# Patient Record
Sex: FEMALE | Race: WHITE | NOT HISPANIC OR LATINO
[De-identification: names, ages, dates, MRNs, and addresses within clinical notes are randomized per-mention and may not be internally consistent; named-entity substitution may affect disease eponyms.]

---

## 2017-01-05 ENCOUNTER — LABORATORY RESULT (OUTPATIENT)
Age: 67
End: 2017-01-05

## 2017-01-05 ENCOUNTER — APPOINTMENT (OUTPATIENT)
Dept: ENDOCRINOLOGY | Facility: CLINIC | Age: 67
End: 2017-01-05

## 2017-01-05 VITALS
HEART RATE: 78 BPM | HEIGHT: 66 IN | DIASTOLIC BLOOD PRESSURE: 78 MMHG | BODY MASS INDEX: 36.96 KG/M2 | SYSTOLIC BLOOD PRESSURE: 122 MMHG | WEIGHT: 230 LBS

## 2017-01-05 LAB — GLUCOSE BLDC GLUCOMTR-MCNC: 150

## 2017-01-06 LAB
T4 FREE SERPL-MCNC: 1.8 NG/DL
T4 SERPL-MCNC: 10 UG/DL
TSH SERPL-ACNC: 0.01 UU/ML

## 2017-01-09 LAB
THYROGLOB AB SERPL-ACNC: <20 IU/ML
THYROGLOB AB SERPL-ACNC: <20 IU/ML
THYROGLOB SERPL-MCNC: <0.2 NG/ML
THYROPEROXIDASE AB SERPL IA-ACNC: <10 IU/ML

## 2017-02-15 ENCOUNTER — RX RENEWAL (OUTPATIENT)
Age: 67
End: 2017-02-15

## 2017-03-13 ENCOUNTER — APPOINTMENT (OUTPATIENT)
Dept: ENDOCRINOLOGY | Facility: CLINIC | Age: 67
End: 2017-03-13

## 2017-03-27 ENCOUNTER — APPOINTMENT (OUTPATIENT)
Dept: ENDOCRINOLOGY | Facility: CLINIC | Age: 67
End: 2017-03-27

## 2017-04-20 ENCOUNTER — LABORATORY RESULT (OUTPATIENT)
Age: 67
End: 2017-04-20

## 2017-04-20 ENCOUNTER — APPOINTMENT (OUTPATIENT)
Dept: ENDOCRINOLOGY | Facility: CLINIC | Age: 67
End: 2017-04-20
Payer: MEDICARE

## 2017-04-20 VITALS
HEIGHT: 66 IN | HEART RATE: 80 BPM | SYSTOLIC BLOOD PRESSURE: 130 MMHG | WEIGHT: 220 LBS | BODY MASS INDEX: 35.36 KG/M2 | DIASTOLIC BLOOD PRESSURE: 70 MMHG

## 2017-04-20 LAB
GLUCOSE BLDC GLUCOMTR-MCNC: 149
HBA1C MFR BLD HPLC: 6.7

## 2017-04-20 PROCEDURE — 83036 HEMOGLOBIN GLYCOSYLATED A1C: CPT | Mod: QW

## 2017-04-20 PROCEDURE — 99214 OFFICE O/P EST MOD 30 MIN: CPT | Mod: 25

## 2017-04-20 PROCEDURE — 36415 COLL VENOUS BLD VENIPUNCTURE: CPT

## 2017-04-20 PROCEDURE — 82962 GLUCOSE BLOOD TEST: CPT

## 2017-04-20 RX ORDER — GLUCAGON 1 MG
1 KIT INJECTION
Qty: 3 | Refills: 0 | Status: ACTIVE | COMMUNITY
Start: 2016-12-07

## 2017-04-20 RX ORDER — EPINEPHRINE 0.3 MG/.3ML
0.3 INJECTION INTRAMUSCULAR
Qty: 6 | Refills: 0 | Status: ACTIVE | COMMUNITY
Start: 2016-12-07

## 2017-04-20 RX ORDER — MOMETASONE FUROATE 1 MG/G
0.1 CREAM TOPICAL
Qty: 45 | Refills: 0 | Status: ACTIVE | COMMUNITY
Start: 2017-04-12

## 2017-04-24 LAB
ALBUMIN SERPL ELPH-MCNC: 4.1 G/DL
ALP BLD-CCNC: 92 U/L
ALT SERPL-CCNC: 11 U/L
ANION GAP SERPL CALC-SCNC: 15 MMOL/L
APPEARANCE: ABNORMAL
AST SERPL-CCNC: 12 U/L
BASOPHILS # BLD AUTO: 0.05 K/UL
BASOPHILS NFR BLD AUTO: 0.4 %
BILIRUB SERPL-MCNC: 0.7 MG/DL
BILIRUBIN URINE: NEGATIVE
BLOOD URINE: NEGATIVE
BUN SERPL-MCNC: 24 MG/DL
CALCIUM SERPL-MCNC: 10.1 MG/DL
CHLORIDE SERPL-SCNC: 105 MMOL/L
CO2 SERPL-SCNC: 24 MMOL/L
COLOR: ABNORMAL
CREAT SERPL-MCNC: 0.85 MG/DL
CREAT SPEC-SCNC: 197 MG/DL
DHEA-S SERPL-MCNC: 69.7 UG/DL
EOSINOPHIL # BLD AUTO: 0.18 K/UL
EOSINOPHIL NFR BLD AUTO: 1.4 %
FERRITIN SERPL-MCNC: 35.7 NG/ML
FOLATE SERPL-MCNC: 17.5 NG/ML
GLUCOSE QUALITATIVE U: 100 MG/DL
GLUCOSE SERPL-MCNC: 102 MG/DL
HCT VFR BLD CALC: 39.1 %
HGB BLD-MCNC: 12.9 G/DL
IMM GRANULOCYTES NFR BLD AUTO: 0.1 %
IRON SATN MFR SERPL: 17 %
IRON SERPL-MCNC: 47 UG/DL
KETONES URINE: ABNORMAL
LEUKOCYTE ESTERASE URINE: ABNORMAL
LYMPHOCYTES # BLD AUTO: 2.53 K/UL
LYMPHOCYTES NFR BLD AUTO: 20.3 %
MAN DIFF?: NORMAL
MCHC RBC-ENTMCNC: 30.6 PG
MCHC RBC-ENTMCNC: 33 GM/DL
MCV RBC AUTO: 92.9 FL
MICROALBUMIN 24H UR DL<=1MG/L-MCNC: 1.4 MG/DL
MICROALBUMIN/CREAT 24H UR-RTO: 7 UG/MG
MONOCYTES # BLD AUTO: 0.63 K/UL
MONOCYTES NFR BLD AUTO: 5.1 %
NEUTROPHILS # BLD AUTO: 9.05 K/UL
NEUTROPHILS NFR BLD AUTO: 72.7 %
NITRITE URINE: NEGATIVE
PH URINE: 6
PLATELET # BLD AUTO: 265 K/UL
POTASSIUM SERPL-SCNC: 4.4 MMOL/L
PROT SERPL-MCNC: 6.4 G/DL
PROTEIN URINE: NEGATIVE MG/DL
RBC # BLD: 4.21 M/UL
RBC # FLD: 15.3 %
SODIUM SERPL-SCNC: 144 MMOL/L
SPECIFIC GRAVITY URINE: 1.03
T4 FREE SERPL-MCNC: 2 NG/DL
T4 SERPL-MCNC: 11.1 UG/DL
TESTOST BND SERPL-MCNC: 1.5 PG/ML
TESTOST SERPL-MCNC: 49.1 NG/DL
THYROGLOB AB SERPL-ACNC: <20 IU/ML
THYROGLOB SERPL-MCNC: <0.2 NG/ML
TIBC SERPL-MCNC: 271 UG/DL
TSH SERPL-ACNC: 0.01 UIU/ML
UIBC SERPL-MCNC: 224 UG/DL
UROBILINOGEN URINE: 1 MG/DL
VIT B12 SERPL-MCNC: 544 PG/ML
WBC # FLD AUTO: 12.45 K/UL

## 2017-05-04 ENCOUNTER — APPOINTMENT (OUTPATIENT)
Dept: ENDOCRINOLOGY | Facility: CLINIC | Age: 67
End: 2017-05-04

## 2017-05-18 ENCOUNTER — APPOINTMENT (OUTPATIENT)
Dept: ENDOCRINOLOGY | Facility: CLINIC | Age: 67
End: 2017-05-18

## 2017-06-19 ENCOUNTER — APPOINTMENT (OUTPATIENT)
Dept: ENDOCRINOLOGY | Facility: CLINIC | Age: 67
End: 2017-06-19

## 2017-08-03 ENCOUNTER — APPOINTMENT (OUTPATIENT)
Dept: ENDOCRINOLOGY | Facility: CLINIC | Age: 67
End: 2017-08-03
Payer: MEDICARE

## 2017-08-03 VITALS
HEART RATE: 82 BPM | BODY MASS INDEX: 35.36 KG/M2 | WEIGHT: 220 LBS | HEIGHT: 66 IN | SYSTOLIC BLOOD PRESSURE: 138 MMHG | DIASTOLIC BLOOD PRESSURE: 78 MMHG

## 2017-08-03 LAB
GLUCOSE BLDC GLUCOMTR-MCNC: 60
GLUCOSE BLDC GLUCOMTR-MCNC: 68
GLUCOSE BLDC GLUCOMTR-MCNC: 78
HBA1C MFR BLD HPLC: 7.1

## 2017-08-03 PROCEDURE — 82962 GLUCOSE BLOOD TEST: CPT

## 2017-08-03 PROCEDURE — 99214 OFFICE O/P EST MOD 30 MIN: CPT | Mod: 25

## 2017-08-03 PROCEDURE — 83036 HEMOGLOBIN GLYCOSYLATED A1C: CPT | Mod: QW

## 2017-09-25 ENCOUNTER — APPOINTMENT (OUTPATIENT)
Dept: ENDOCRINOLOGY | Facility: CLINIC | Age: 67
End: 2017-09-25
Payer: MEDICARE

## 2017-09-25 PROCEDURE — G0108 DIAB MANAGE TRN  PER INDIV: CPT

## 2017-10-09 ENCOUNTER — APPOINTMENT (OUTPATIENT)
Dept: ENDOCRINOLOGY | Facility: CLINIC | Age: 67
End: 2017-10-09

## 2017-10-11 ENCOUNTER — LABORATORY RESULT (OUTPATIENT)
Age: 67
End: 2017-10-11

## 2017-10-11 ENCOUNTER — APPOINTMENT (OUTPATIENT)
Dept: ENDOCRINOLOGY | Facility: CLINIC | Age: 67
End: 2017-10-11
Payer: MEDICARE

## 2017-10-11 VITALS
HEIGHT: 66 IN | HEART RATE: 80 BPM | BODY MASS INDEX: 35.36 KG/M2 | WEIGHT: 220 LBS | SYSTOLIC BLOOD PRESSURE: 128 MMHG | DIASTOLIC BLOOD PRESSURE: 76 MMHG

## 2017-10-11 LAB — GLUCOSE BLDC GLUCOMTR-MCNC: 170

## 2017-10-11 PROCEDURE — 82962 GLUCOSE BLOOD TEST: CPT

## 2017-10-11 PROCEDURE — 99214 OFFICE O/P EST MOD 30 MIN: CPT | Mod: 25

## 2017-10-11 PROCEDURE — 36415 COLL VENOUS BLD VENIPUNCTURE: CPT

## 2017-10-16 LAB
ALBUMIN SERPL ELPH-MCNC: 4.1 G/DL
ALP BLD-CCNC: 89 U/L
ALT SERPL-CCNC: 6 U/L
ANION GAP SERPL CALC-SCNC: 16 MMOL/L
AST SERPL-CCNC: 18 U/L
BILIRUB SERPL-MCNC: 0.8 MG/DL
BUN SERPL-MCNC: 27 MG/DL
CALCIUM SERPL-MCNC: 9.7 MG/DL
CHLORIDE SERPL-SCNC: 102 MMOL/L
CO2 SERPL-SCNC: 21 MMOL/L
CREAT SERPL-MCNC: 1.16 MG/DL
GLUCOSE SERPL-MCNC: 192 MG/DL
POTASSIUM SERPL-SCNC: 4.8 MMOL/L
PROT SERPL-MCNC: 6.6 G/DL
SODIUM SERPL-SCNC: 139 MMOL/L
T4 FREE SERPL-MCNC: 1.8 NG/DL
T4 SERPL-MCNC: 10.5 UG/DL
THYROGLOB AB SERPL-ACNC: <20 IU/ML
THYROGLOB SERPL-MCNC: <0.2 NG/ML
TSH SERPL-ACNC: 0.16 UIU/ML

## 2017-10-19 ENCOUNTER — RX RENEWAL (OUTPATIENT)
Age: 67
End: 2017-10-19

## 2018-01-08 ENCOUNTER — LABORATORY RESULT (OUTPATIENT)
Age: 68
End: 2018-01-08

## 2018-01-08 ENCOUNTER — APPOINTMENT (OUTPATIENT)
Dept: ENDOCRINOLOGY | Facility: CLINIC | Age: 68
End: 2018-01-08
Payer: MEDICARE

## 2018-01-08 VITALS
WEIGHT: 216 LBS | HEART RATE: 78 BPM | HEIGHT: 64.5 IN | DIASTOLIC BLOOD PRESSURE: 74 MMHG | BODY MASS INDEX: 36.43 KG/M2 | SYSTOLIC BLOOD PRESSURE: 122 MMHG

## 2018-01-08 LAB
GLUCOSE BLDC GLUCOMTR-MCNC: 103
HBA1C MFR BLD HPLC: 8.6

## 2018-01-08 PROCEDURE — 83036 HEMOGLOBIN GLYCOSYLATED A1C: CPT | Mod: QW

## 2018-01-08 PROCEDURE — 36415 COLL VENOUS BLD VENIPUNCTURE: CPT

## 2018-01-08 PROCEDURE — 99214 OFFICE O/P EST MOD 30 MIN: CPT | Mod: 25

## 2018-01-08 PROCEDURE — 82962 GLUCOSE BLOOD TEST: CPT

## 2018-01-08 RX ORDER — CLOBETASOL PROPIONATE 0.5 MG/G
0.05 OINTMENT TOPICAL
Qty: 30 | Refills: 0 | Status: ACTIVE | COMMUNITY
Start: 2017-08-24

## 2018-01-09 LAB
ALBUMIN SERPL ELPH-MCNC: 3.9 G/DL
ALP BLD-CCNC: 93 U/L
ALT SERPL-CCNC: 8 U/L
ANION GAP SERPL CALC-SCNC: 16 MMOL/L
AST SERPL-CCNC: 11 U/L
BASOPHILS # BLD AUTO: 0.05 K/UL
BASOPHILS NFR BLD AUTO: 0.6 %
BILIRUB SERPL-MCNC: 0.8 MG/DL
BUN SERPL-MCNC: 24 MG/DL
CALCIUM SERPL-MCNC: 9.6 MG/DL
CHLORIDE SERPL-SCNC: 105 MMOL/L
CO2 SERPL-SCNC: 24 MMOL/L
CREAT SERPL-MCNC: 0.84 MG/DL
EOSINOPHIL # BLD AUTO: 0.17 K/UL
EOSINOPHIL NFR BLD AUTO: 1.9 %
GLUCOSE SERPL-MCNC: 101 MG/DL
HCT VFR BLD CALC: 44.2 %
HGB BLD-MCNC: 13 G/DL
IMM GRANULOCYTES NFR BLD AUTO: 0.2 %
LYMPHOCYTES # BLD AUTO: 1.96 K/UL
LYMPHOCYTES NFR BLD AUTO: 22.4 %
MAN DIFF?: NORMAL
MCHC RBC-ENTMCNC: 29.4 GM/DL
MCHC RBC-ENTMCNC: 29.6 PG
MCV RBC AUTO: 100.7 FL
MONOCYTES # BLD AUTO: 0.49 K/UL
MONOCYTES NFR BLD AUTO: 5.6 %
NEUTROPHILS # BLD AUTO: 6.06 K/UL
NEUTROPHILS NFR BLD AUTO: 69.3 %
PLATELET # BLD AUTO: 215 K/UL
POTASSIUM SERPL-SCNC: 4.3 MMOL/L
PROT SERPL-MCNC: 6.5 G/DL
RBC # BLD: 4.39 M/UL
RBC # FLD: 15 %
SODIUM SERPL-SCNC: 145 MMOL/L
T4 FREE SERPL-MCNC: 1.6 NG/DL
T4 SERPL-MCNC: 8.5 UG/DL
TSH SERPL-ACNC: 0.24 UIU/ML
WBC # FLD AUTO: 8.75 K/UL

## 2018-01-11 LAB — THYROGLOB AB SERPL IA-ACNC: 55 IU/ML

## 2018-01-17 ENCOUNTER — APPOINTMENT (OUTPATIENT)
Dept: ENDOCRINOLOGY | Facility: CLINIC | Age: 68
End: 2018-01-17
Payer: MEDICARE

## 2018-01-17 PROCEDURE — G0108 DIAB MANAGE TRN  PER INDIV: CPT

## 2018-01-24 ENCOUNTER — APPOINTMENT (OUTPATIENT)
Dept: ENDOCRINOLOGY | Facility: CLINIC | Age: 68
End: 2018-01-24
Payer: MEDICARE

## 2018-01-24 PROCEDURE — G0108 DIAB MANAGE TRN  PER INDIV: CPT

## 2018-01-24 PROCEDURE — 95249 CONT GLUC MNTR PT PROV EQP: CPT

## 2018-05-17 ENCOUNTER — APPOINTMENT (OUTPATIENT)
Dept: ENDOCRINOLOGY | Facility: CLINIC | Age: 68
End: 2018-05-17
Payer: MEDICARE

## 2018-05-17 ENCOUNTER — LABORATORY RESULT (OUTPATIENT)
Age: 68
End: 2018-05-17

## 2018-05-17 ENCOUNTER — MEDICATION RENEWAL (OUTPATIENT)
Age: 68
End: 2018-05-17

## 2018-05-17 VITALS
OXYGEN SATURATION: 96 % | HEART RATE: 80 BPM | DIASTOLIC BLOOD PRESSURE: 75 MMHG | HEIGHT: 64.5 IN | SYSTOLIC BLOOD PRESSURE: 119 MMHG | BODY MASS INDEX: 34.91 KG/M2 | WEIGHT: 207 LBS

## 2018-05-17 LAB — GLUCOSE BLDC GLUCOMTR-MCNC: 125

## 2018-05-17 PROCEDURE — 82962 GLUCOSE BLOOD TEST: CPT

## 2018-05-17 PROCEDURE — 36415 COLL VENOUS BLD VENIPUNCTURE: CPT

## 2018-05-17 PROCEDURE — 99214 OFFICE O/P EST MOD 30 MIN: CPT | Mod: 25

## 2018-05-22 LAB
T4 FREE SERPL-MCNC: 1.7 NG/DL
T4 SERPL-MCNC: 9.7 UG/DL
TSH SERPL-ACNC: 0.09 UIU/ML

## 2018-05-23 LAB — THYROGLOB AB SERPL IA-ACNC: 54 IU/ML

## 2018-06-18 ENCOUNTER — APPOINTMENT (OUTPATIENT)
Dept: ENDOCRINOLOGY | Facility: CLINIC | Age: 68
End: 2018-06-18
Payer: MEDICARE

## 2018-06-18 VITALS — HEART RATE: 69 BPM | DIASTOLIC BLOOD PRESSURE: 77 MMHG | OXYGEN SATURATION: 98 % | SYSTOLIC BLOOD PRESSURE: 123 MMHG

## 2018-06-18 LAB — GLUCOSE BLDC GLUCOMTR-MCNC: 146

## 2018-06-18 PROCEDURE — G0108 DIAB MANAGE TRN  PER INDIV: CPT

## 2018-06-18 PROCEDURE — 82962 GLUCOSE BLOOD TEST: CPT

## 2018-07-30 ENCOUNTER — LABORATORY RESULT (OUTPATIENT)
Age: 68
End: 2018-07-30

## 2018-07-30 ENCOUNTER — APPOINTMENT (OUTPATIENT)
Dept: INTERNAL MEDICINE | Facility: CLINIC | Age: 68
End: 2018-07-30
Payer: MEDICARE

## 2018-07-30 VITALS
BODY MASS INDEX: 32.92 KG/M2 | HEART RATE: 79 BPM | HEIGHT: 65.5 IN | OXYGEN SATURATION: 98 % | WEIGHT: 200 LBS | TEMPERATURE: 97.8 F

## 2018-07-30 PROCEDURE — 99213 OFFICE O/P EST LOW 20 MIN: CPT | Mod: 25

## 2018-07-30 PROCEDURE — 99203 OFFICE O/P NEW LOW 30 MIN: CPT | Mod: 25

## 2018-07-30 PROCEDURE — 36415 COLL VENOUS BLD VENIPUNCTURE: CPT

## 2018-07-30 RX ORDER — SOLIFENACIN SUCCINATE 5 MG/1
5 TABLET, FILM COATED ORAL
Qty: 30 | Refills: 0 | Status: DISCONTINUED | COMMUNITY
Start: 2016-12-19 | End: 2018-07-30

## 2018-07-30 RX ORDER — DULOXETINE HYDROCHLORIDE 60 MG/1
60 CAPSULE, DELAYED RELEASE PELLETS ORAL
Qty: 60 | Refills: 0 | Status: DISCONTINUED | COMMUNITY
Start: 2017-07-17 | End: 2018-07-30

## 2018-07-30 RX ORDER — LEVOTHYROXINE SODIUM 137 UG/1
137 TABLET ORAL
Qty: 1 | Refills: 1 | Status: DISCONTINUED | COMMUNITY
Start: 2017-08-03 | End: 2018-07-30

## 2018-07-30 RX ORDER — SULFAMETHOXAZOLE AND TRIMETHOPRIM 800; 160 MG/1; MG/1
800-160 TABLET ORAL
Qty: 20 | Refills: 0 | Status: DISCONTINUED | COMMUNITY
Start: 2017-10-19 | End: 2018-07-30

## 2018-07-30 RX ORDER — LEVOTHYROXINE SODIUM 137 UG/1
137 TABLET ORAL
Qty: 90 | Refills: 1 | Status: DISCONTINUED | COMMUNITY
Start: 2018-06-25 | End: 2018-07-30

## 2018-07-30 RX ORDER — LEVOTHYROXINE SODIUM 175 UG/1
175 TABLET ORAL
Qty: 90 | Refills: 0 | Status: DISCONTINUED | COMMUNITY
Start: 2016-11-19 | End: 2018-07-30

## 2018-07-30 RX ORDER — INSULIN DEGLUDEC INJECTION 200 U/ML
200 INJECTION, SOLUTION SUBCUTANEOUS
Qty: 18 | Refills: 0 | Status: DISCONTINUED | COMMUNITY
Start: 2016-12-07 | End: 2018-07-30

## 2018-07-30 RX ORDER — INSULIN GLARGINE 300 U/ML
300 INJECTION, SOLUTION SUBCUTANEOUS
Qty: 2 | Refills: 2 | Status: DISCONTINUED | COMMUNITY
Start: 2017-07-25 | End: 2018-07-30

## 2018-08-02 ENCOUNTER — APPOINTMENT (OUTPATIENT)
Dept: ENDOCRINOLOGY | Facility: CLINIC | Age: 68
End: 2018-08-02
Payer: MEDICARE

## 2018-08-02 VITALS — DIASTOLIC BLOOD PRESSURE: 80 MMHG | SYSTOLIC BLOOD PRESSURE: 130 MMHG

## 2018-08-02 PROCEDURE — G0108 DIAB MANAGE TRN  PER INDIV: CPT
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
## 2018-08-02 NOTE — PHYSICAL EXAM

## 2018-08-09 ENCOUNTER — APPOINTMENT (OUTPATIENT)
Dept: ENDOCRINOLOGY | Facility: CLINIC | Age: 68
End: 2018-08-09
Payer: MEDICARE

## 2018-08-09 VITALS
WEIGHT: 200 LBS | OXYGEN SATURATION: 96 % | HEART RATE: 85 BPM | DIASTOLIC BLOOD PRESSURE: 81 MMHG | SYSTOLIC BLOOD PRESSURE: 141 MMHG | BODY MASS INDEX: 32.92 KG/M2 | HEIGHT: 65.5 IN

## 2018-08-09 LAB — GLUCOSE BLDC GLUCOMTR-MCNC: 190

## 2018-08-09 PROCEDURE — 95251 CONT GLUC MNTR ANALYSIS I&R: CPT

## 2018-08-09 PROCEDURE — G0108 DIAB MANAGE TRN  PER INDIV: CPT

## 2018-08-09 PROCEDURE — 82962 GLUCOSE BLOOD TEST: CPT

## 2018-08-15 ENCOUNTER — OTHER (OUTPATIENT)
Age: 68
End: 2018-08-15

## 2018-08-16 ENCOUNTER — APPOINTMENT (OUTPATIENT)
Dept: ENDOCRINOLOGY | Facility: CLINIC | Age: 68
End: 2018-08-16
Payer: MEDICARE

## 2018-08-16 VITALS — SYSTOLIC BLOOD PRESSURE: 148 MMHG | DIASTOLIC BLOOD PRESSURE: 84 MMHG | HEART RATE: 75 BPM | OXYGEN SATURATION: 98 %

## 2018-08-16 PROCEDURE — 95251 CONT GLUC MNTR ANALYSIS I&R: CPT

## 2018-08-16 PROCEDURE — G0108 DIAB MANAGE TRN  PER INDIV: CPT

## 2018-08-20 ENCOUNTER — MED ADMIN CHARGE (OUTPATIENT)
Age: 68
End: 2018-08-20

## 2018-08-27 ENCOUNTER — APPOINTMENT (OUTPATIENT)
Dept: ENDOCRINOLOGY | Facility: CLINIC | Age: 68
End: 2018-08-27
Payer: MEDICARE

## 2018-08-27 VITALS
WEIGHT: 210 LBS | HEIGHT: 65.5 IN | BODY MASS INDEX: 34.57 KG/M2 | OXYGEN SATURATION: 96 % | SYSTOLIC BLOOD PRESSURE: 133 MMHG | HEART RATE: 92 BPM | DIASTOLIC BLOOD PRESSURE: 72 MMHG

## 2018-08-27 PROCEDURE — G0108 DIAB MANAGE TRN  PER INDIV: CPT

## 2018-08-27 PROCEDURE — 95251 CONT GLUC MNTR ANALYSIS I&R: CPT

## 2018-08-27 PROCEDURE — 82962 GLUCOSE BLOOD TEST: CPT

## 2018-08-28 LAB — GLUCOSE BLDC GLUCOMTR-MCNC: 126

## 2018-08-30 ENCOUNTER — LABORATORY RESULT (OUTPATIENT)
Age: 68
End: 2018-08-30

## 2018-08-30 ENCOUNTER — APPOINTMENT (OUTPATIENT)
Dept: ENDOCRINOLOGY | Facility: CLINIC | Age: 68
End: 2018-08-30
Payer: MEDICARE

## 2018-08-30 VITALS
OXYGEN SATURATION: 95 % | HEIGHT: 65.5 IN | WEIGHT: 207 LBS | SYSTOLIC BLOOD PRESSURE: 143 MMHG | HEART RATE: 79 BPM | BODY MASS INDEX: 34.07 KG/M2 | DIASTOLIC BLOOD PRESSURE: 79 MMHG

## 2018-08-30 LAB — GLUCOSE BLDC GLUCOMTR-MCNC: 218

## 2018-08-30 PROCEDURE — 99215 OFFICE O/P EST HI 40 MIN: CPT | Mod: 25

## 2018-08-30 PROCEDURE — 36415 COLL VENOUS BLD VENIPUNCTURE: CPT

## 2018-08-30 PROCEDURE — 82962 GLUCOSE BLOOD TEST: CPT

## 2018-08-31 LAB
SAVE SPECIMEN: NORMAL
T4 FREE SERPL-MCNC: 1.8 NG/DL
T4 SERPL-MCNC: 9.8 UG/DL
TSH SERPL-ACNC: 0.07 UIU/ML

## 2018-09-05 ENCOUNTER — LABORATORY RESULT (OUTPATIENT)
Age: 68
End: 2018-09-05

## 2018-09-05 ENCOUNTER — APPOINTMENT (OUTPATIENT)
Dept: ENDOCRINOLOGY | Facility: CLINIC | Age: 68
End: 2018-09-05
Payer: MEDICARE

## 2018-09-05 ENCOUNTER — APPOINTMENT (OUTPATIENT)
Dept: INTERNAL MEDICINE | Facility: CLINIC | Age: 68
End: 2018-09-05
Payer: MEDICARE

## 2018-09-05 VITALS
DIASTOLIC BLOOD PRESSURE: 79 MMHG | WEIGHT: 205 LBS | SYSTOLIC BLOOD PRESSURE: 142 MMHG | BODY MASS INDEX: 33.6 KG/M2 | OXYGEN SATURATION: 98 % | HEART RATE: 73 BPM

## 2018-09-05 VITALS
OXYGEN SATURATION: 98 % | WEIGHT: 205 LBS | HEART RATE: 84 BPM | BODY MASS INDEX: 33.75 KG/M2 | HEIGHT: 65.5 IN | TEMPERATURE: 98.5 F

## 2018-09-05 VITALS — SYSTOLIC BLOOD PRESSURE: 130 MMHG | DIASTOLIC BLOOD PRESSURE: 80 MMHG

## 2018-09-05 LAB
GLUCOSE BLDC GLUCOMTR-MCNC: 216
THYROGLOB AB SERPL IA-ACNC: 36 IU/ML

## 2018-09-05 PROCEDURE — 82962 GLUCOSE BLOOD TEST: CPT

## 2018-09-05 PROCEDURE — 99214 OFFICE O/P EST MOD 30 MIN: CPT

## 2018-09-05 PROCEDURE — G0108 DIAB MANAGE TRN  PER INDIV: CPT

## 2018-09-12 ENCOUNTER — LABORATORY RESULT (OUTPATIENT)
Age: 68
End: 2018-09-12

## 2018-09-12 ENCOUNTER — APPOINTMENT (OUTPATIENT)
Dept: ENDOCRINOLOGY | Facility: CLINIC | Age: 68
End: 2018-09-12
Payer: MEDICARE

## 2018-09-12 LAB — GLUCOSE BLDC GLUCOMTR-MCNC: 164

## 2018-09-12 PROCEDURE — 82962 GLUCOSE BLOOD TEST: CPT

## 2018-09-12 PROCEDURE — G0108 DIAB MANAGE TRN  PER INDIV: CPT

## 2018-09-17 LAB — BACTERIA UR CULT: NORMAL

## 2018-10-17 NOTE — PHYSICAL EXAM
[No Acute Distress] : no acute distress [Well Nourished] : well nourished [de-identified] : right TM red, dull, [de-identified] : no LN [de-identified] : clear

## 2018-10-17 NOTE — REVIEW OF SYSTEMS
[Negative] : Heme/Lymph [FreeTextEntry2] : diabetes better, uses a 24/7 monitor 110-145 range [FreeTextEntry5] : cardioolgy  increased her lipitor to 40 mg [FreeTextEntry9] : shoulder much better

## 2018-10-17 NOTE — HISTORY OF PRESENT ILLNESS
[FreeTextEntry8] : here to fill out papers and also has a cold for more than one week with pain in her right ear.\par shoulder better\par parkinson's stable.

## 2018-10-17 NOTE — HEALTH RISK ASSESSMENT
[No falls in past year] : Patient reported no falls in the past year [0] : 1) Little interest or pleasure doing things: Not at all (0) [1] : 2) Feeling down, depressed, or hopeless for several days (1) [] : No [[id number]] : 1

## 2018-11-07 ENCOUNTER — APPOINTMENT (OUTPATIENT)
Dept: PULMONOLOGY | Facility: CLINIC | Age: 68
End: 2018-11-07
Payer: MEDICARE

## 2018-11-07 VITALS
HEART RATE: 80 BPM | HEIGHT: 65.5 IN | RESPIRATION RATE: 16 BRPM | SYSTOLIC BLOOD PRESSURE: 145 MMHG | DIASTOLIC BLOOD PRESSURE: 80 MMHG | BODY MASS INDEX: 32.92 KG/M2 | TEMPERATURE: 98 F | WEIGHT: 200 LBS | OXYGEN SATURATION: 95 %

## 2018-11-07 DIAGNOSIS — G47.33 OBSTRUCTIVE SLEEP APNEA (ADULT) (PEDIATRIC): ICD-10-CM

## 2018-11-07 PROCEDURE — 99204 OFFICE O/P NEW MOD 45 MIN: CPT

## 2018-11-15 ENCOUNTER — APPOINTMENT (OUTPATIENT)
Dept: ENDOCRINOLOGY | Facility: CLINIC | Age: 68
End: 2018-11-15
Payer: MEDICARE

## 2018-11-15 VITALS
DIASTOLIC BLOOD PRESSURE: 56 MMHG | SYSTOLIC BLOOD PRESSURE: 110 MMHG | OXYGEN SATURATION: 96 % | WEIGHT: 200 LBS | HEART RATE: 77 BPM | BODY MASS INDEX: 32.78 KG/M2

## 2018-11-15 LAB — GLUCOSE BLDC GLUCOMTR-MCNC: 214

## 2018-11-15 PROCEDURE — 36415 COLL VENOUS BLD VENIPUNCTURE: CPT

## 2018-11-15 PROCEDURE — 82962 GLUCOSE BLOOD TEST: CPT

## 2018-11-15 PROCEDURE — 99214 OFFICE O/P EST MOD 30 MIN: CPT | Mod: 25

## 2018-11-15 RX ORDER — BACILLUS COAGULANS/INULIN 1B-250 MG
CAPSULE ORAL
Refills: 0 | Status: ACTIVE | COMMUNITY

## 2018-11-19 LAB
SAVE SPECIMEN: NORMAL
T4 FREE SERPL-MCNC: 1.3 NG/DL
T4 SERPL-MCNC: 7.3 UG/DL
TSH SERPL-ACNC: 0.41 UIU/ML

## 2018-12-03 ENCOUNTER — MEDICATION RENEWAL (OUTPATIENT)
Age: 68
End: 2018-12-03

## 2018-12-17 ENCOUNTER — APPOINTMENT (OUTPATIENT)
Dept: INTERNAL MEDICINE | Facility: CLINIC | Age: 68
End: 2018-12-17
Payer: MEDICARE

## 2018-12-17 VITALS — HEART RATE: 94 BPM | HEIGHT: 65.5 IN | TEMPERATURE: 98 F | OXYGEN SATURATION: 97 %

## 2018-12-17 VITALS — DIASTOLIC BLOOD PRESSURE: 80 MMHG | SYSTOLIC BLOOD PRESSURE: 130 MMHG

## 2018-12-17 PROCEDURE — 99213 OFFICE O/P EST LOW 20 MIN: CPT

## 2018-12-17 RX ORDER — AZITHROMYCIN 250 MG/1
250 TABLET, FILM COATED ORAL
Qty: 6 | Refills: 0 | Status: DISCONTINUED | COMMUNITY
Start: 2018-09-05 | End: 2018-12-17

## 2018-12-17 RX ORDER — MIRABEGRON 50 MG/1
50 TABLET, FILM COATED, EXTENDED RELEASE ORAL
Refills: 0 | Status: DISCONTINUED | COMMUNITY
End: 2018-12-17

## 2018-12-17 RX ORDER — BENZONATATE 200 MG/1
200 CAPSULE ORAL 3 TIMES DAILY
Qty: 270 | Refills: 0 | Status: COMPLETED | COMMUNITY
Start: 2018-09-05 | End: 2018-12-17

## 2018-12-17 NOTE — REVIEW OF SYSTEMS
[Negative] : Respiratory [FreeTextEntry7] : f/u on cyst in pancreas/ has taken a lot of antibiotics and advised has seen GI for diarrhea.  [FreeTextEntry8] : had botox in urethra.  [FreeTextEntry9] : shoulder from is good. but has a numbing pain into right thumb and forefinger and w/u for carpal tunnel and doesn't have that [de-identified] : no tremor, walks and exercises [de-identified] : stressed and upset with her children [FreeTextEntry1] : bs up and down

## 2018-12-17 NOTE — HEALTH RISK ASSESSMENT
[No falls in past year] : Patient reported no falls in the past year [0] : 1) Little interest or pleasure doing things: Not at all (0) [1] : 2) Feeling down, depressed, or hopeless for several days (1) [] : No [[id number]] : 1

## 2018-12-17 NOTE — PHYSICAL EXAM
[No Acute Distress] : no acute distress [Well Nourished] : well nourished [Well Developed] : well developed [No Respiratory Distress] : no respiratory distress  [Clear to Auscultation] : lungs were clear to auscultation bilaterally [Normal Rate] : normal rate  [Regular Rhythm] : with a regular rhythm [No Edema] : there was no peripheral edema [No Extremity Clubbing/Cyanosis] : no extremity clubbing/cyanosis [Soft] : abdomen soft

## 2018-12-28 ENCOUNTER — MEDICATION RENEWAL (OUTPATIENT)
Age: 68
End: 2018-12-28

## 2018-12-28 RX ORDER — ACETAMINOPHEN AND CODEINE PHOSPHATE 60; 300 MG/1; MG/1
300-60 TABLET ORAL
Qty: 60 | Refills: 0 | Status: ACTIVE | COMMUNITY
Start: 2018-12-28 | End: 1900-01-01

## 2018-12-28 RX ORDER — RASAGILINE 1 MG/1
1 TABLET ORAL DAILY
Qty: 90 | Refills: 0 | Status: ACTIVE | COMMUNITY
Start: 2018-12-28 | End: 1900-01-01

## 2019-01-03 ENCOUNTER — APPOINTMENT (OUTPATIENT)
Dept: ENDOCRINOLOGY | Facility: CLINIC | Age: 69
End: 2019-01-03
Payer: MEDICARE

## 2019-01-03 VITALS
OXYGEN SATURATION: 98 % | DIASTOLIC BLOOD PRESSURE: 71 MMHG | WEIGHT: 205 LBS | HEART RATE: 74 BPM | BODY MASS INDEX: 33.6 KG/M2 | SYSTOLIC BLOOD PRESSURE: 140 MMHG

## 2019-01-03 LAB
GLUCOSE BLDC GLUCOMTR-MCNC: 62
HBA1C MFR BLD HPLC: 8.5

## 2019-01-03 PROCEDURE — 82962 GLUCOSE BLOOD TEST: CPT

## 2019-01-03 PROCEDURE — G0108 DIAB MANAGE TRN  PER INDIV: CPT

## 2019-01-03 PROCEDURE — 83036 HEMOGLOBIN GLYCOSYLATED A1C: CPT | Mod: QW

## 2019-01-04 ENCOUNTER — MEDICATION RENEWAL (OUTPATIENT)
Age: 69
End: 2019-01-04

## 2019-01-04 RX ORDER — BLOOD SUGAR DIAGNOSTIC
STRIP MISCELLANEOUS 4 TIMES DAILY
Qty: 4 | Refills: 2 | Status: ACTIVE | COMMUNITY
Start: 1900-01-01 | End: 1900-01-01

## 2019-01-15 ENCOUNTER — APPOINTMENT (OUTPATIENT)
Dept: ENDOCRINOLOGY | Facility: CLINIC | Age: 69
End: 2019-01-15
Payer: MEDICARE

## 2019-01-15 VITALS
OXYGEN SATURATION: 98 % | HEIGHT: 65.5 IN | DIASTOLIC BLOOD PRESSURE: 73 MMHG | WEIGHT: 205 LBS | HEART RATE: 76 BPM | BODY MASS INDEX: 33.75 KG/M2 | SYSTOLIC BLOOD PRESSURE: 128 MMHG

## 2019-01-15 LAB — GLUCOSE BLDC GLUCOMTR-MCNC: 162

## 2019-01-15 PROCEDURE — 95251 CONT GLUC MNTR ANALYSIS I&R: CPT

## 2019-01-15 PROCEDURE — 99213 OFFICE O/P EST LOW 20 MIN: CPT | Mod: 25

## 2019-01-15 PROCEDURE — 95249 CONT GLUC MNTR PT PROV EQP: CPT

## 2019-01-15 PROCEDURE — 82962 GLUCOSE BLOOD TEST: CPT

## 2019-01-15 NOTE — ASSESSMENT
[Carbohydrate Consistent Diet] : carbohydrate consistent diet [Hypoglycemia Management] : hypoglycemia management [Diabetes Foot Care] : diabetes foot care [Long Term Vascular Complications] : long term vascular complications of diabetes [Importance of Diet and Exercise] : importance of diet and exercise to improve glycemic control, achieve weight loss and improve cardiovascular health [Action and use of Insulin] : action and use of short and long-acting insulin [Self Monitoring of Blood Glucose] : self monitoring of blood glucose [Insulin Self-Administration] : insulin self-administration [Levothyroxine] : The patient was instructed to take Levothyroxine on an empty stomach, separate from vitamins, and wait at least 30 minutes before eating [FreeTextEntry1] : 1) type 2 diabetes -Patient's with well controlled type 1 diabetes..  Last visit I changed her Lantus to Toujeo and she will continue 24  units daily.To avoid hypoglycemia she must check her blood sugar before every meal to determine correct dose of Apidra. She is happy with the FreeStyle Kristie.\par Sensor: TIR 61% 12 low and 27 % high above 250 9%, will check sensor when up in middle of night to avoid lows, she may have been higher recently due to cortisone injections. \par 2) Papillary thyroid cancer -She recently had a negative thyroid ultrasound with some benign looking lymph nodes in her neck. She does have persistent elevated thyroglobulin antibodies which have slowly been going down and undetectable thyroglobulin by mass spectroscopy. she is on Synthroid 125 .\par \par Rebekah SARAVIA helped her place her sensor today
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
## 2019-01-15 NOTE — PHYSICAL EXAM
[Alert] : alert [No Acute Distress] : no acute distress [Well Nourished] : well nourished [Well Developed] : well developed [Normal Sclera/Conjunctiva] : normal sclera/conjunctiva [EOMI] : extra ocular movement intact [No Proptosis] : no proptosis [Normal Oropharynx] : the oropharynx was normal [Thyroid Not Enlarged] : the thyroid was not enlarged [Well Healed Scar] : well healed scar [No Thyroid Nodules] : there were no palpable thyroid nodules [No Respiratory Distress] : no respiratory distress [No Accessory Muscle Use] : no accessory muscle use [Clear to Auscultation] : lungs were clear to auscultation bilaterally [Normal Rate] : heart rate was normal  [Normal S1, S2] : normal S1 and S2 [Regular Rhythm] : with a regular rhythm [Pedal Pulses Normal] : the pedal pulses are present [No Edema] : there was no peripheral edema [Normal Bowel Sounds] : normal bowel sounds [Not Tender] : non-tender [Soft] : abdomen soft [Not Distended] : not distended [Post Cervical Nodes] : posterior cervical nodes [Anterior Cervical Nodes] : anterior cervical nodes [Axillary Nodes] : axillary nodes [Normal] : normal and non tender [No Spinal Tenderness] : no spinal tenderness [Spine Straight] : spine straight [No Stigmata of Cushings Syndrome] : no stigmata of cushings syndrome [Normal Gait] : normal gait [Normal Strength/Tone] : muscle strength and tone were normal [No Rash] : no rash [Normal Reflexes] : deep tendon reflexes were 2+ and symmetric [No Tremors] : no tremors [Oriented x3] : oriented to person, place, and time [Acanthosis Nigricans] : no acanthosis nigricans

## 2019-02-20 ENCOUNTER — APPOINTMENT (OUTPATIENT)
Dept: ENDOCRINOLOGY | Facility: CLINIC | Age: 69
End: 2019-02-20
Payer: MEDICARE

## 2019-02-20 VITALS
HEART RATE: 70 BPM | BODY MASS INDEX: 33.75 KG/M2 | OXYGEN SATURATION: 97 % | DIASTOLIC BLOOD PRESSURE: 52 MMHG | WEIGHT: 205 LBS | SYSTOLIC BLOOD PRESSURE: 126 MMHG | HEIGHT: 65.5 IN

## 2019-02-20 LAB — GLUCOSE BLDC GLUCOMTR-MCNC: 98

## 2019-02-20 PROCEDURE — G0108 DIAB MANAGE TRN  PER INDIV: CPT

## 2019-02-20 PROCEDURE — 95251 CONT GLUC MNTR ANALYSIS I&R: CPT

## 2019-03-04 ENCOUNTER — RX RENEWAL (OUTPATIENT)
Age: 69
End: 2019-03-04

## 2019-03-07 ENCOUNTER — APPOINTMENT (OUTPATIENT)
Dept: ENDOCRINOLOGY | Facility: CLINIC | Age: 69
End: 2019-03-07

## 2019-03-18 ENCOUNTER — APPOINTMENT (OUTPATIENT)
Dept: INTERNAL MEDICINE | Facility: CLINIC | Age: 69
End: 2019-03-18

## 2019-03-27 ENCOUNTER — LABORATORY RESULT (OUTPATIENT)
Age: 69
End: 2019-03-27

## 2019-03-27 ENCOUNTER — APPOINTMENT (OUTPATIENT)
Dept: INTERNAL MEDICINE | Facility: CLINIC | Age: 69
End: 2019-03-27
Payer: MEDICARE

## 2019-03-27 ENCOUNTER — APPOINTMENT (OUTPATIENT)
Dept: ENDOCRINOLOGY | Facility: CLINIC | Age: 69
End: 2019-03-27
Payer: MEDICARE

## 2019-03-27 VITALS
OXYGEN SATURATION: 95 % | WEIGHT: 215 LBS | BODY MASS INDEX: 35.39 KG/M2 | HEIGHT: 65.5 IN | HEART RATE: 76 BPM | TEMPERATURE: 98.1 F

## 2019-03-27 VITALS
OXYGEN SATURATION: 98 % | SYSTOLIC BLOOD PRESSURE: 147 MMHG | WEIGHT: 217.38 LBS | BODY MASS INDEX: 35.78 KG/M2 | DIASTOLIC BLOOD PRESSURE: 78 MMHG | HEIGHT: 65.5 IN | HEART RATE: 70 BPM

## 2019-03-27 VITALS — DIASTOLIC BLOOD PRESSURE: 80 MMHG | SYSTOLIC BLOOD PRESSURE: 120 MMHG

## 2019-03-27 LAB — GLUCOSE BLDC GLUCOMTR-MCNC: 240

## 2019-03-27 PROCEDURE — 99215 OFFICE O/P EST HI 40 MIN: CPT | Mod: 25

## 2019-03-27 PROCEDURE — 82962 GLUCOSE BLOOD TEST: CPT

## 2019-03-27 PROCEDURE — 95251 CONT GLUC MNTR ANALYSIS I&R: CPT

## 2019-03-27 PROCEDURE — 99213 OFFICE O/P EST LOW 20 MIN: CPT

## 2019-03-27 PROCEDURE — 36415 COLL VENOUS BLD VENIPUNCTURE: CPT

## 2019-03-27 RX ORDER — ESOMEPRAZOLE MAGNESIUM 40 MG/1
40 CAPSULE, DELAYED RELEASE ORAL
Qty: 90 | Refills: 0 | Status: DISCONTINUED | COMMUNITY
Start: 2016-12-07 | End: 2019-03-27

## 2019-03-27 RX ORDER — CLOTRIMAZOLE AND BETAMETHASONE DIPROPIONATE 10; .5 MG/G; MG/G
1-0.05 CREAM TOPICAL
Qty: 45 | Refills: 0 | Status: DISCONTINUED | COMMUNITY
Start: 2017-07-10 | End: 2019-03-27

## 2019-03-27 NOTE — PHYSICAL EXAM
[Alert] : alert [No Acute Distress] : no acute distress [Well Nourished] : well nourished [Well Developed] : well developed [Normal Sclera/Conjunctiva] : normal sclera/conjunctiva [EOMI] : extra ocular movement intact [No Proptosis] : no proptosis [Normal Oropharynx] : the oropharynx was normal [Thyroid Not Enlarged] : the thyroid was not enlarged [Well Healed Scar] : well healed scar [No Thyroid Nodules] : there were no palpable thyroid nodules [No Respiratory Distress] : no respiratory distress [No Accessory Muscle Use] : no accessory muscle use [Clear to Auscultation] : lungs were clear to auscultation bilaterally [Normal Rate] : heart rate was normal  [Normal S1, S2] : normal S1 and S2 [Regular Rhythm] : with a regular rhythm [Pedal Pulses Normal] : the pedal pulses are present [No Edema] : there was no peripheral edema [Normal Bowel Sounds] : normal bowel sounds [Not Tender] : non-tender [Soft] : abdomen soft [Not Distended] : not distended [Post Cervical Nodes] : posterior cervical nodes [Anterior Cervical Nodes] : anterior cervical nodes [Axillary Nodes] : axillary nodes [Normal] : normal and non tender [No Spinal Tenderness] : no spinal tenderness [Spine Straight] : spine straight [No Stigmata of Cushings Syndrome] : no stigmata of cushings syndrome [Normal Gait] : normal gait [Normal Strength/Tone] : muscle strength and tone were normal [No Rash] : no rash [Acanthosis Nigricans] : no acanthosis nigricans [Normal Reflexes] : deep tendon reflexes were 2+ and symmetric [No Tremors] : no tremors [Oriented x3] : oriented to person, place, and time

## 2019-03-27 NOTE — PLAN
[FreeTextEntry1] : massage and chiropractic, flonase, wait for laabs, trial of creon, may be one of meds causing diarrhea

## 2019-03-27 NOTE — ASSESSMENT
[FreeTextEntry1] : 1) type 2 diabetes -Patient's with fair controlled type 1 diabetes..  She is on  Toujeo and she will continue 24  units daily.To avoid hypoglycemia she must check her blood sugar before every meal to determine correct dose of Apidra. She is happy with the FreeStyle Kristie.\par \par Sensor: TIR 46% 12 low and 12  % high above 250  below 54 8%, will check sensor when up in middle of night to avoid lows, \par \par **Need to focus on avoiding hypoglycemia. Will change CF from 30 to  40  and if has PP highs will use target of 180. \par f/u CDE 2 weeks.  Will also need to discuss use of arrows on sensor to help adjust boluses.  \par \par 2) Papillary thyroid cancer -F/u thyroid ultrasound due in April;  she last had some benign looking lymph nodes in her neck. She does have persistent elevated thyroglobulin antibodies which have slowly been going down and undetectable thyroglobulin by mass spectroscopy. she is on Synthroid 125 .\par \par 3) diarrhea- associated with pancreatic cyst and atrophic pancreas.  f/u MRI to be done; discuss use of digestive enzymes with GI.   [Carbohydrate Consistent Diet] : carbohydrate consistent diet [Hypoglycemia Management] : hypoglycemia management [Glucagon Use] : glucagon use [Diabetes Foot Care] : diabetes foot care [Long Term Vascular Complications] : long term vascular complications of diabetes [Importance of Diet and Exercise] : importance of diet and exercise to improve glycemic control, achieve weight loss and improve cardiovascular health [Self Monitoring of Blood Glucose] : self monitoring of blood glucose [Retinopathy Screening] : Patient was referred to ophthalmology for retinopathy screening [Levothyroxine] : The patient was instructed to take Levothyroxine on an empty stomach, separate from vitamins, and wait at least 30 minutes before eating

## 2019-03-27 NOTE — PHYSICAL EXAM
[No Acute Distress] : no acute distress [Normal Rate] : normal rate  [Regular Rhythm] : with a regular rhythm [No Edema] : there was no peripheral edema [de-identified] : throat neg/ TM ok [de-identified] : neck marked de rease rom

## 2019-03-27 NOTE — HEALTH RISK ASSESSMENT
[No falls in past year] : Patient reported no falls in the past year [0] : 1) Little interest or pleasure doing things: Not at all (0) [1] : 2) Feeling down, depressed, or hopeless for several days (1) [] : No [[id number]] : 1

## 2019-03-27 NOTE — HISTORY OF PRESENT ILLNESS
[FreeTextEntry1] : BS gets too low in am,  [de-identified] : 1/ headaches, over forehead and over gums\par 2/ wemt tp see Zide today\par 3/ gets loose stools, and is incontinent\par 4/ pip joint hurts bilat on 2nd digit, hands feels swollen\par 5/ going for pancreast cyst\par 6 going for bone density, thyroid\par 7/ pain right lat arm\par 8/ pain in right llat leg\par 9/ chills
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
## 2019-03-28 ENCOUNTER — RX RENEWAL (OUTPATIENT)
Age: 69
End: 2019-03-28

## 2019-03-28 LAB
25(OH)D3 SERPL-MCNC: 54.2 NG/ML
ALBUMIN SERPL ELPH-MCNC: 4.2 G/DL
ALP BLD-CCNC: 78 U/L
ALT SERPL-CCNC: 23 U/L
ANION GAP SERPL CALC-SCNC: 11 MMOL/L
APPEARANCE: ABNORMAL
AST SERPL-CCNC: 18 U/L
BASOPHILS # BLD AUTO: 0.08 K/UL
BASOPHILS NFR BLD AUTO: 1 %
BILIRUB SERPL-MCNC: 0.7 MG/DL
BILIRUBIN URINE: NEGATIVE
BLOOD URINE: NORMAL
BUN SERPL-MCNC: 25 MG/DL
CALCIUM SERPL-MCNC: 9.9 MG/DL
CHLORIDE SERPL-SCNC: 103 MMOL/L
CHOLEST SERPL-MCNC: 138 MG/DL
CHOLEST/HDLC SERPL: 2.2 RATIO
CO2 SERPL-SCNC: 28 MMOL/L
COLOR: YELLOW
CREAT SERPL-MCNC: 0.76 MG/DL
CREAT SPEC-SCNC: 86 MG/DL
EOSINOPHIL # BLD AUTO: 0.19 K/UL
EOSINOPHIL NFR BLD AUTO: 2.4 %
FERRITIN SERPL-MCNC: 34 NG/ML
FOLATE SERPL-MCNC: 13.9 NG/ML
GLUCOSE QUALITATIVE U: ABNORMAL
GLUCOSE SERPL-MCNC: 203 MG/DL
HCT VFR BLD CALC: 40.5 %
HDLC SERPL-MCNC: 64 MG/DL
HGB BLD-MCNC: 12.2 G/DL
IMM GRANULOCYTES NFR BLD AUTO: 0.1 %
IRON SATN MFR SERPL: 24 %
IRON SERPL-MCNC: 72 UG/DL
KETONES URINE: NEGATIVE
LDLC SERPL CALC-MCNC: 62 MG/DL
LEUKOCYTE ESTERASE URINE: ABNORMAL
LYMPHOCYTES # BLD AUTO: 1.71 K/UL
LYMPHOCYTES NFR BLD AUTO: 21.6 %
MAN DIFF?: NORMAL
MCHC RBC-ENTMCNC: 29.3 PG
MCHC RBC-ENTMCNC: 30.1 GM/DL
MCV RBC AUTO: 97.1 FL
MICROALBUMIN 24H UR DL<=1MG/L-MCNC: 1.3 MG/DL
MICROALBUMIN/CREAT 24H UR-RTO: 15 MG/G
MONOCYTES # BLD AUTO: 0.48 K/UL
MONOCYTES NFR BLD AUTO: 6.1 %
NEUTROPHILS # BLD AUTO: 5.45 K/UL
NEUTROPHILS NFR BLD AUTO: 68.8 %
NITRITE URINE: NEGATIVE
PH URINE: 6.5
PLATELET # BLD AUTO: 239 K/UL
POTASSIUM SERPL-SCNC: 4.3 MMOL/L
PROT SERPL-MCNC: 6.4 G/DL
PROTEIN URINE: NORMAL
RBC # BLD: 4.17 M/UL
RBC # FLD: 14.4 %
SODIUM SERPL-SCNC: 142 MMOL/L
SPECIFIC GRAVITY URINE: 1.02
T4 FREE SERPL-MCNC: 1.5 NG/DL
T4 SERPL-MCNC: 8.9 UG/DL
TIBC SERPL-MCNC: 306 UG/DL
TRIGL SERPL-MCNC: 59 MG/DL
TSH SERPL-ACNC: 0.37 UIU/ML
UIBC SERPL-MCNC: 234 UG/DL
UROBILINOGEN URINE: NORMAL
VIT B12 SERPL-MCNC: 407 PG/ML
WBC # FLD AUTO: 7.92 K/UL

## 2019-04-01 LAB — THYROGLOB AB SERPL IA-ACNC: 38 IU/ML

## 2019-04-28 ENCOUNTER — RX RENEWAL (OUTPATIENT)
Age: 69
End: 2019-04-28

## 2019-05-03 ENCOUNTER — TRANSCRIPTION ENCOUNTER (OUTPATIENT)
Age: 69
End: 2019-05-03

## 2019-05-13 ENCOUNTER — MEDICATION RENEWAL (OUTPATIENT)
Age: 69
End: 2019-05-13

## 2019-05-23 ENCOUNTER — APPOINTMENT (OUTPATIENT)
Dept: ENDOCRINOLOGY | Facility: CLINIC | Age: 69
End: 2019-05-23
Payer: MEDICARE

## 2019-05-23 VITALS
OXYGEN SATURATION: 94 % | BODY MASS INDEX: 34.57 KG/M2 | WEIGHT: 210 LBS | HEART RATE: 77 BPM | HEIGHT: 65.5 IN | DIASTOLIC BLOOD PRESSURE: 62 MMHG | SYSTOLIC BLOOD PRESSURE: 116 MMHG

## 2019-05-23 LAB — GLUCOSE BLDC GLUCOMTR-MCNC: 110

## 2019-05-23 PROCEDURE — G0108 DIAB MANAGE TRN  PER INDIV: CPT

## 2019-05-23 PROCEDURE — 95251 CONT GLUC MNTR ANALYSIS I&R: CPT

## 2019-06-24 ENCOUNTER — RX RENEWAL (OUTPATIENT)
Age: 69
End: 2019-06-24

## 2019-06-26 ENCOUNTER — APPOINTMENT (OUTPATIENT)
Dept: INTERNAL MEDICINE | Facility: CLINIC | Age: 69
End: 2019-06-26
Payer: MEDICARE

## 2019-06-26 VITALS — DIASTOLIC BLOOD PRESSURE: 80 MMHG | SYSTOLIC BLOOD PRESSURE: 110 MMHG

## 2019-06-26 VITALS
TEMPERATURE: 98.7 F | HEART RATE: 87 BPM | OXYGEN SATURATION: 98 % | HEIGHT: 65 IN | WEIGHT: 205 LBS | BODY MASS INDEX: 34.16 KG/M2

## 2019-06-26 DIAGNOSIS — Z87.39 PERSONAL HISTORY OF OTHER DISEASES OF THE MUSCULOSKELETAL SYSTEM AND CONNECTIVE TISSUE: ICD-10-CM

## 2019-06-26 DIAGNOSIS — Q45.3 OTHER CONGENITAL MALFORMATIONS OF PANCREAS AND PANCREATIC DUCT: ICD-10-CM

## 2019-06-26 DIAGNOSIS — E66.9 OBESITY, UNSPECIFIED: ICD-10-CM

## 2019-06-26 DIAGNOSIS — L68.0 HIRSUTISM: ICD-10-CM

## 2019-06-26 DIAGNOSIS — M54.12 RADICULOPATHY, CERVICAL REGION: ICD-10-CM

## 2019-06-26 DIAGNOSIS — Z86.59 PERSONAL HISTORY OF OTHER MENTAL AND BEHAVIORAL DISORDERS: ICD-10-CM

## 2019-06-26 DIAGNOSIS — Z96.611 PRESENCE OF RIGHT ARTIFICIAL SHOULDER JOINT: ICD-10-CM

## 2019-06-26 DIAGNOSIS — Z78.0 ASYMPTOMATIC MENOPAUSAL STATE: ICD-10-CM

## 2019-06-26 DIAGNOSIS — Z11.1 ENCOUNTER FOR SCREENING FOR RESPIRATORY TUBERCULOSIS: ICD-10-CM

## 2019-06-26 DIAGNOSIS — Z12.31 ENCOUNTER FOR SCREENING MAMMOGRAM FOR MALIGNANT NEOPLASM OF BREAST: ICD-10-CM

## 2019-06-26 DIAGNOSIS — Z85.850 PERSONAL HISTORY OF MALIGNANT NEOPLASM OF THYROID: ICD-10-CM

## 2019-06-26 DIAGNOSIS — Z86.39 PERSONAL HISTORY OF OTHER ENDOCRINE, NUTRITIONAL AND METABOLIC DISEASE: ICD-10-CM

## 2019-06-26 DIAGNOSIS — T14.8XXA OTHER INJURY OF UNSPECIFIED BODY REGION, INITIAL ENCOUNTER: ICD-10-CM

## 2019-06-26 DIAGNOSIS — J32.3 CHRONIC SPHENOIDAL SINUSITIS: ICD-10-CM

## 2019-06-26 PROCEDURE — 99214 OFFICE O/P EST MOD 30 MIN: CPT

## 2019-06-26 RX ORDER — CETIRIZINE HYDROCHLORIDE AND PSEUDOEPHEDRINE HYDROCHLORIDE 5; 120 MG/1; MG/1
5-120 TABLET, FILM COATED, EXTENDED RELEASE ORAL TWICE DAILY
Qty: 180 | Refills: 0 | Status: COMPLETED | COMMUNITY
Start: 2018-09-05 | End: 2019-06-26

## 2019-06-26 RX ORDER — PANCRELIPASE 30000; 6000; 19000 [USP'U]/1; [USP'U]/1; [USP'U]/1
6000-19000 CAPSULE, DELAYED RELEASE PELLETS ORAL 4 TIMES DAILY
Qty: 120 | Refills: 2 | Status: DISCONTINUED | COMMUNITY
Start: 2019-03-27 | End: 2019-06-26

## 2019-06-26 NOTE — HEALTH RISK ASSESSMENT
[Yes] : Yes [Monthly or less (1 pt)] : Monthly or less (1 point) [1 or 2 (0 pts)] : 1 or 2 (0 points) [Never (0 pts)] : Never (0 points) [No] : In the past 12 months have you used drugs other than those required for medical reasons? No [No falls in past year] : Patient reported no falls in the past year [0] : 1) Little interest or pleasure doing things: Not at all (0) [1] : 2) Feeling down, depressed, or hopeless for several days (1) [] : No [de-identified] : Orthopedic [Audit-CScore] : 1 [de-identified] : No [de-identified] : diabetic diet, no fat [FreeTextEntry1] : Pt reports feeling depressed. [[id number]] : 1

## 2019-06-26 NOTE — PHYSICAL EXAM
[No Respiratory Distress] : no respiratory distress  [Clear to Auscultation] : lungs were clear to auscultation bilaterally [de-identified] : lea conroy worried in nad [Regular Rhythm] : with a regular rhythm [de-identified] : trace edema [de-identified] : decreased rom of neck from of right shoulder

## 2019-06-26 NOTE — ASSESSMENT
[FreeTextEntry1] : discussed al of medical problems with her . to call her GI super specialist tomorrow.\par not clear what to do about diarrhea.\par to follow up with ortho\par think she should go to pt

## 2019-06-26 NOTE — REVIEW OF SYSTEMS
[Negative] : Eyes [FreeTextEntry2] : as above [FreeTextEntry4] : as above [FreeTextEntry5] : notes she takes avalide hs but is up all night peeing [FreeTextEntry8] : sees doc for OAB [FreeTextEntry7] : see above [FreeTextEntry9] : see above. shoulder is good [de-identified] : see above [de-identified] : see above

## 2019-06-26 NOTE — HISTORY OF PRESENT ILLNESS
[de-identified] : 1/ may have sphenoid sinusitis neck M/rI shows possible sphenoid sinusits, also has recurring ear pain and headache\par 2/ neck killing her and pain in right arm/ hurts all of thetime, but hasn;t gone for PT\par is going to pain managemnt.\par 3/ diarrhea: GI thinks it is due to meds seeing superspecialist, had pancreatic study, is scheduled for more tests/ not on metformin\par 4/ notes she is short tempered and not feeling right\par 5 did see neuro and he said she was doing well6/ recent a1c was hi but patient doing well and watches sugar all of the time [FreeTextEntry1] : here to re

## 2019-07-03 ENCOUNTER — APPOINTMENT (OUTPATIENT)
Dept: ENDOCRINOLOGY | Facility: CLINIC | Age: 69
End: 2019-07-03
Payer: MEDICARE

## 2019-07-03 ENCOUNTER — LABORATORY RESULT (OUTPATIENT)
Age: 69
End: 2019-07-03

## 2019-07-03 VITALS
BODY MASS INDEX: 34.16 KG/M2 | HEIGHT: 65 IN | DIASTOLIC BLOOD PRESSURE: 83 MMHG | OXYGEN SATURATION: 96 % | WEIGHT: 205 LBS | SYSTOLIC BLOOD PRESSURE: 139 MMHG | HEART RATE: 94 BPM

## 2019-07-03 LAB
GLUCOSE BLDC GLUCOMTR-MCNC: 310
HBA1C MFR BLD HPLC: 8

## 2019-07-03 PROCEDURE — 83036 HEMOGLOBIN GLYCOSYLATED A1C: CPT | Mod: QW

## 2019-07-03 PROCEDURE — 82962 GLUCOSE BLOOD TEST: CPT

## 2019-07-03 PROCEDURE — 99215 OFFICE O/P EST HI 40 MIN: CPT | Mod: 25

## 2019-07-03 PROCEDURE — 36415 COLL VENOUS BLD VENIPUNCTURE: CPT

## 2019-07-08 LAB
ALBUMIN SERPL ELPH-MCNC: 4.1 G/DL
ALP BLD-CCNC: 94 U/L
ALT SERPL-CCNC: 18 U/L
ANION GAP SERPL CALC-SCNC: 17 MMOL/L
AST SERPL-CCNC: 17 U/L
BILIRUB SERPL-MCNC: 0.6 MG/DL
BUN SERPL-MCNC: 25 MG/DL
CALCIUM SERPL-MCNC: 9.6 MG/DL
CHLORIDE SERPL-SCNC: 100 MMOL/L
CO2 SERPL-SCNC: 20 MMOL/L
CREAT SERPL-MCNC: 0.82 MG/DL
GLUCOSE SERPL-MCNC: 293 MG/DL
POTASSIUM SERPL-SCNC: 4.4 MMOL/L
PROT SERPL-MCNC: 6.5 G/DL
SAVE SPECIMEN: NORMAL
SODIUM SERPL-SCNC: 137 MMOL/L
T4 FREE SERPL-MCNC: 1.5 NG/DL
T4 SERPL-MCNC: 8.1 UG/DL
THYROGLOB AB SERPL IA-ACNC: 37 IU/ML
TSH SERPL-ACNC: 0.86 UIU/ML

## 2019-08-15 ENCOUNTER — APPOINTMENT (OUTPATIENT)
Dept: ENDOCRINOLOGY | Facility: CLINIC | Age: 69
End: 2019-08-15

## 2019-08-19 RX ORDER — PANTOPRAZOLE 40 MG/1
40 TABLET, DELAYED RELEASE ORAL
Qty: 90 | Refills: 3 | Status: ACTIVE | COMMUNITY
Start: 2017-07-18 | End: 1900-01-01

## 2019-08-19 RX ORDER — ATORVASTATIN CALCIUM 40 MG/1
40 TABLET, FILM COATED ORAL
Qty: 90 | Refills: 3 | Status: ACTIVE | COMMUNITY
Start: 2019-06-24 | End: 1900-01-01

## 2019-08-30 ENCOUNTER — MOBILE ON CALL (OUTPATIENT)
Age: 69
End: 2019-08-30

## 2019-09-25 ENCOUNTER — APPOINTMENT (OUTPATIENT)
Dept: ENDOCRINOLOGY | Facility: CLINIC | Age: 69
End: 2019-09-25
Payer: MEDICARE

## 2019-09-25 VITALS
HEART RATE: 64 BPM | SYSTOLIC BLOOD PRESSURE: 145 MMHG | DIASTOLIC BLOOD PRESSURE: 85 MMHG | OXYGEN SATURATION: 96 % | BODY MASS INDEX: 34.95 KG/M2 | WEIGHT: 210 LBS

## 2019-09-25 LAB — GLUCOSE BLDC GLUCOMTR-MCNC: 217

## 2019-09-25 PROCEDURE — 95251 CONT GLUC MNTR ANALYSIS I&R: CPT

## 2019-09-25 PROCEDURE — G0108 DIAB MANAGE TRN  PER INDIV: CPT
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
## 2019-09-27 NOTE — ASSESSMENT
[Carbohydrate Consistent Diet] : carbohydrate consistent diet [Hypoglycemia Management] : hypoglycemia management [Diabetes Foot Care] : diabetes foot care [Long Term Vascular Complications] : long term vascular complications of diabetes [Importance of Diet and Exercise] : importance of diet and exercise to improve glycemic control, achieve weight loss and improve cardiovascular health [Action and use of Insulin] : action and use of short and long-acting insulin [Self Monitoring of Blood Glucose] : self monitoring of blood glucose [Retinopathy Screening] : Patient was referred to ophthalmology for retinopathy screening [Levothyroxine] : The patient was instructed to take Levothyroxine on an empty stomach, separate from vitamins, and wait at least 30 minutes before eating [FreeTextEntry1] : 1) type 1 diabetes -Patient's with fair controlled type 1 diabetes..  She is on  Toujeo and she will continue 22- 24  units daily.To avoid hypoglycemia she must check her blood sugar before every meal to determine correct dose of Apidra. She is happy with the FreeStyle Kristie.\par \par **Need to focus on avoiding hypoglycemia. Will change CF from 30 to  40  and if has PP highs will use target of 180. \par 2) Papillary thyroid cancer -F/u thyroid ultrasound due in April;  she last had some benign looking lymph nodes in her neck. She does have persistent elevated thyroglobulin antibodies which have slowly been going down and undetectable thyroglobulin by mass spectroscopy. she is on Synthroid 125 .( due to GI symptoms will try Tirosint we had samples of 75 ug and 100, so one day will take 100 and alternate with 150 2X 75)\par \par 3) diarrhea- associated with pancreatic cyst and atrophic pancreas.  f/u MRI to be done; discuss use of digestive enzymes with GI.

## 2019-09-27 NOTE — PHYSICAL EXAM
[Alert] : alert [No Acute Distress] : no acute distress [Well Developed] : well developed [Well Nourished] : well nourished [Normal Sclera/Conjunctiva] : normal sclera/conjunctiva [No Proptosis] : no proptosis [EOMI] : extra ocular movement intact [Normal Oropharynx] : the oropharynx was normal [Thyroid Not Enlarged] : the thyroid was not enlarged [Well Healed Scar] : well healed scar [No Respiratory Distress] : no respiratory distress [No Accessory Muscle Use] : no accessory muscle use [No Thyroid Nodules] : there were no palpable thyroid nodules [Normal S1, S2] : normal S1 and S2 [Normal Rate] : heart rate was normal  [Clear to Auscultation] : lungs were clear to auscultation bilaterally [Regular Rhythm] : with a regular rhythm [Pedal Pulses Normal] : the pedal pulses are present [No Edema] : there was no peripheral edema [Normal Bowel Sounds] : normal bowel sounds [Not Tender] : non-tender [Soft] : abdomen soft [Not Distended] : not distended [Post Cervical Nodes] : posterior cervical nodes [Anterior Cervical Nodes] : anterior cervical nodes [Axillary Nodes] : axillary nodes [Normal] : normal and non tender [No Spinal Tenderness] : no spinal tenderness [Spine Straight] : spine straight [No Stigmata of Cushings Syndrome] : no stigmata of cushings syndrome [Normal Gait] : normal gait [Normal Strength/Tone] : muscle strength and tone were normal [No Rash] : no rash [Normal Reflexes] : deep tendon reflexes were 2+ and symmetric [No Tremors] : no tremors [Oriented x3] : oriented to person, place, and time [Acanthosis Nigricans] : no acanthosis nigricans

## 2019-10-23 ENCOUNTER — APPOINTMENT (OUTPATIENT)
Dept: INTERNAL MEDICINE | Facility: CLINIC | Age: 69
End: 2019-10-23
Payer: MEDICARE

## 2019-10-23 VITALS — DIASTOLIC BLOOD PRESSURE: 80 MMHG | SYSTOLIC BLOOD PRESSURE: 126 MMHG

## 2019-10-23 VITALS
TEMPERATURE: 98.2 F | HEIGHT: 65 IN | HEART RATE: 90 BPM | WEIGHT: 210 LBS | OXYGEN SATURATION: 97 % | BODY MASS INDEX: 34.99 KG/M2

## 2019-10-23 PROCEDURE — 99213 OFFICE O/P EST LOW 20 MIN: CPT

## 2019-10-23 NOTE — HEALTH RISK ASSESSMENT
[No] : In the past 12 months have you used drugs other than those required for medical reasons? No [No falls in past year] : Patient reported no falls in the past year [3] : 2) Feeling down, depressed, or hopeless for nearly every day (3) [de-identified] : Gastro  [] : No [Audit-CScore] : 0 [[id number]] : 6 [de-identified] : none [de-identified] : low sugar

## 2019-10-23 NOTE — HISTORY OF PRESENT ILLNESS
[FreeTextEntry1] : rright ear pain [de-identified] : pressure over sinus and into left side of brain\[place] and has infeciton in her colon\par also has lactose intolerance

## 2019-10-23 NOTE — PHYSICAL EXAM
[No Acute Distress] : no acute distress [Normal] : normal rate, regular rhythm, normal S1 and S2 and no murmur heard [de-identified] : right TM is ok, left is ok [de-identified] : nervous

## 2019-10-28 RX ORDER — BUTALBITAL AND ACETAMINOPHEN 300; 50 MG/1; MG/1
50-300 TABLET ORAL EVERY 6 HOURS
Qty: 90 | Refills: 1 | Status: ACTIVE | COMMUNITY
Start: 2019-10-28 | End: 1900-01-01

## 2019-10-29 ENCOUNTER — LABORATORY RESULT (OUTPATIENT)
Age: 69
End: 2019-10-29

## 2019-10-29 ENCOUNTER — APPOINTMENT (OUTPATIENT)
Dept: ENDOCRINOLOGY | Facility: CLINIC | Age: 69
End: 2019-10-29
Payer: MEDICARE

## 2019-10-29 VITALS
OXYGEN SATURATION: 97 % | SYSTOLIC BLOOD PRESSURE: 104 MMHG | DIASTOLIC BLOOD PRESSURE: 72 MMHG | WEIGHT: 210 LBS | HEIGHT: 65 IN | HEART RATE: 88 BPM | BODY MASS INDEX: 34.99 KG/M2

## 2019-10-29 LAB
GLUCOSE BLDC GLUCOMTR-MCNC: 177
HBA1C MFR BLD HPLC: 9.1

## 2019-10-29 PROCEDURE — 83036 HEMOGLOBIN GLYCOSYLATED A1C: CPT | Mod: QW

## 2019-10-29 PROCEDURE — 82962 GLUCOSE BLOOD TEST: CPT

## 2019-10-29 PROCEDURE — 99214 OFFICE O/P EST MOD 30 MIN: CPT | Mod: 25

## 2019-10-29 NOTE — ASSESSMENT
[FreeTextEntry1] : 1) type 1 diabetes -Patient's with fair controlled type 1 diabetes..  She is on  Toujeo and she will continue 22- 24  units daily.To avoid hypoglycemia she must check her blood sugar before every meal to determine correct dose of Apidra. She is happy with the FreeStyle Kristie.To download reader tomorrow when comes in for labs. \par \par **Need to focus on avoiding hypoglycemia. Will change CF from 30 to  40  and if has PP highs will use target of 180. \par 2) Papillary thyroid cancer -F/u thyroid ultrasound due in April;  she last had some benign looking lymph nodes in her neck. She does have persistent elevated thyroglobulin antibodies which have slowly been going down and undetectable thyroglobulin by mass spectroscopy. she is on Synthroid 125.\par 3) diarrhea- associated with pancreatic cyst and atrophic pancreas.  f/u MRI to be done; discuss use of digestive enzymes with GI.  On rx for infection and was dx lactose intolerant.  [Carbohydrate Consistent Diet] : carbohydrate consistent diet [Hypoglycemia Management] : hypoglycemia management [Diabetes Foot Care] : diabetes foot care [Long Term Vascular Complications] : long term vascular complications of diabetes [Importance of Diet and Exercise] : importance of diet and exercise to improve glycemic control, achieve weight loss and improve cardiovascular health [Self Monitoring of Blood Glucose] : self monitoring of blood glucose [Retinopathy Screening] : Patient was referred to ophthalmology for retinopathy screening [Levothyroxine] : The patient was instructed to take Levothyroxine on an empty stomach, separate from vitamins, and wait at least 30 minutes before eating
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
## 2019-10-31 ENCOUNTER — LABORATORY RESULT (OUTPATIENT)
Age: 69
End: 2019-10-31

## 2019-10-31 LAB
25(OH)D3 SERPL-MCNC: 37.6 NG/ML
ALBUMIN SERPL ELPH-MCNC: 4 G/DL
ALP BLD-CCNC: 89 U/L
ALT SERPL-CCNC: 33 U/L
ANION GAP SERPL CALC-SCNC: 15 MMOL/L
APPEARANCE: ABNORMAL
AST SERPL-CCNC: 20 U/L
BASOPHILS # BLD AUTO: 0.07 K/UL
BASOPHILS NFR BLD AUTO: 0.8 %
BILIRUB SERPL-MCNC: 1 MG/DL
BILIRUBIN URINE: NEGATIVE
BLOOD URINE: NEGATIVE
BUN SERPL-MCNC: 22 MG/DL
CALCIUM SERPL-MCNC: 9.7 MG/DL
CHLORIDE SERPL-SCNC: 100 MMOL/L
CHOLEST SERPL-MCNC: 149 MG/DL
CHOLEST/HDLC SERPL: 2.2 RATIO
CO2 SERPL-SCNC: 26 MMOL/L
COLOR: YELLOW
CREAT SERPL-MCNC: 0.77 MG/DL
CREAT SPEC-SCNC: 131 MG/DL
EOSINOPHIL # BLD AUTO: 0.21 K/UL
EOSINOPHIL NFR BLD AUTO: 2.5 %
GLUCOSE QUALITATIVE U: ABNORMAL
GLUCOSE SERPL-MCNC: 159 MG/DL
HCT VFR BLD CALC: 41.5 %
HDLC SERPL-MCNC: 68 MG/DL
HGB BLD-MCNC: 12.9 G/DL
IMM GRANULOCYTES NFR BLD AUTO: 0.5 %
KETONES URINE: NEGATIVE
LDLC SERPL CALC-MCNC: 70 MG/DL
LEUKOCYTE ESTERASE URINE: ABNORMAL
LYMPHOCYTES # BLD AUTO: 2.25 K/UL
LYMPHOCYTES NFR BLD AUTO: 26.3 %
MAN DIFF?: NORMAL
MCHC RBC-ENTMCNC: 29.4 PG
MCHC RBC-ENTMCNC: 31.1 GM/DL
MCV RBC AUTO: 94.5 FL
MICROALBUMIN 24H UR DL<=1MG/L-MCNC: <1.2 MG/DL
MICROALBUMIN/CREAT 24H UR-RTO: NORMAL MG/G
MONOCYTES # BLD AUTO: 0.64 K/UL
MONOCYTES NFR BLD AUTO: 7.5 %
NEUTROPHILS # BLD AUTO: 5.33 K/UL
NEUTROPHILS NFR BLD AUTO: 62.4 %
NITRITE URINE: NEGATIVE
PH URINE: 6
PLATELET # BLD AUTO: 225 K/UL
POTASSIUM SERPL-SCNC: 4.2 MMOL/L
PROT SERPL-MCNC: 6 G/DL
PROTEIN URINE: NORMAL
RBC # BLD: 4.39 M/UL
RBC # FLD: 15.6 %
SAVE SPECIMEN: NORMAL
SODIUM SERPL-SCNC: 141 MMOL/L
SPECIFIC GRAVITY URINE: 1.02
T4 FREE SERPL-MCNC: 1.4 NG/DL
T4 SERPL-MCNC: 8.3 UG/DL
TRIGL SERPL-MCNC: 53 MG/DL
TSH SERPL-ACNC: 1.45 UIU/ML
UROBILINOGEN URINE: NORMAL
WBC # FLD AUTO: 8.54 K/UL

## 2019-11-03 ENCOUNTER — MOBILE ON CALL (OUTPATIENT)
Age: 69
End: 2019-11-03

## 2019-11-06 LAB — THYROGLOB AB SERPL IA-ACNC: 30 IU/ML

## 2019-11-07 ENCOUNTER — APPOINTMENT (OUTPATIENT)
Dept: ENDOCRINOLOGY | Facility: CLINIC | Age: 69
End: 2019-11-07

## 2019-12-16 ENCOUNTER — RX RENEWAL (OUTPATIENT)
Age: 69
End: 2019-12-16

## 2019-12-17 ENCOUNTER — LABORATORY RESULT (OUTPATIENT)
Age: 69
End: 2019-12-17

## 2019-12-18 ENCOUNTER — APPOINTMENT (OUTPATIENT)
Dept: ENDOCRINOLOGY | Facility: CLINIC | Age: 69
End: 2019-12-18
Payer: MEDICARE

## 2019-12-18 VITALS
DIASTOLIC BLOOD PRESSURE: 79 MMHG | SYSTOLIC BLOOD PRESSURE: 143 MMHG | HEART RATE: 76 BPM | WEIGHT: 210 LBS | BODY MASS INDEX: 34.99 KG/M2 | OXYGEN SATURATION: 97 % | HEIGHT: 65 IN

## 2019-12-18 DIAGNOSIS — E73.9 LACTOSE INTOLERANCE, UNSPECIFIED: ICD-10-CM

## 2019-12-18 LAB — GLUCOSE BLDC GLUCOMTR-MCNC: 230

## 2019-12-18 PROCEDURE — 99215 OFFICE O/P EST HI 40 MIN: CPT | Mod: 25

## 2019-12-18 PROCEDURE — 82962 GLUCOSE BLOOD TEST: CPT

## 2019-12-18 PROCEDURE — 36415 COLL VENOUS BLD VENIPUNCTURE: CPT

## 2019-12-18 PROCEDURE — 95251 CONT GLUC MNTR ANALYSIS I&R: CPT

## 2019-12-18 RX ORDER — SACROSIDASE 8500 [IU]/ML
SOLUTION ORAL
Refills: 0 | Status: ACTIVE | COMMUNITY
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
## 2019-12-18 NOTE — PHYSICAL EXAM
[Alert] : alert [Well Developed] : well developed [No Acute Distress] : no acute distress [Well Nourished] : well nourished [Normal Sclera/Conjunctiva] : normal sclera/conjunctiva [EOMI] : extra ocular movement intact [No Proptosis] : no proptosis [Thyroid Not Enlarged] : the thyroid was not enlarged [Well Healed Scar] : well healed scar [Normal Oropharynx] : the oropharynx was normal [No Accessory Muscle Use] : no accessory muscle use [No Thyroid Nodules] : there were no palpable thyroid nodules [No Respiratory Distress] : no respiratory distress [Normal S1, S2] : normal S1 and S2 [Clear to Auscultation] : lungs were clear to auscultation bilaterally [Normal Rate] : heart rate was normal  [Regular Rhythm] : with a regular rhythm [Pedal Pulses Normal] : the pedal pulses are present [No Edema] : there was no peripheral edema [Not Distended] : not distended [Not Tender] : non-tender [Normal Bowel Sounds] : normal bowel sounds [Soft] : abdomen soft [Anterior Cervical Nodes] : anterior cervical nodes [Axillary Nodes] : axillary nodes [Post Cervical Nodes] : posterior cervical nodes [Normal] : normal and non tender [No Spinal Tenderness] : no spinal tenderness [Normal Gait] : normal gait [Normal Strength/Tone] : muscle strength and tone were normal [Spine Straight] : spine straight [No Stigmata of Cushings Syndrome] : no stigmata of cushings syndrome [No Rash] : no rash [Normal Reflexes] : deep tendon reflexes were 2+ and symmetric [Acanthosis Nigricans] : no acanthosis nigricans [Oriented x3] : oriented to person, place, and time [No Tremors] : no tremors

## 2019-12-18 NOTE — ASSESSMENT
[Hypoglycemia Management] : hypoglycemia management [Carbohydrate Consistent Diet] : carbohydrate consistent diet [FreeTextEntry1] : 1) type 1 diabetes -Patient's with fair controlled type 1 diabetes..  She is on  Toujeo and she will continue 22- 24  units daily.To avoid hypoglycemia she must check her blood sugar before every meal to determine correct dose of Apidra. She is happy with the FreeStyle Kristie.\par Sensor: TIR 51 % lows 6 % over 250 12%\par This does show a lot of variability. She says that occasionally she boluses in middle of night because sugar is over 200. But since having lows in am have told her to stop doing this. \par **Need to focus on avoiding hypoglycemia. changed CF from 30 to  40  and if has PP highs will use target of 180. \par 2) Papillary thyroid cancer -F/u thyroid ultrasound due in April;  she last had some benign looking lymph nodes in her neck. She does have persistent elevated thyroglobulin antibodies which have slowly been going down and undetectable thyroglobulin by mass spectroscopy. she is on TIROSINT 125.LABS DONE TODAY \par 3) diarrhea- associated with pancreatic cyst and atrophic pancreas.  f/u MRI to be done; discuss use of digestive enzymes with GI.  On rx for infection and was dx lactose AND SUCROSE  intolerant.  [Diabetes Foot Care] : diabetes foot care [Long Term Vascular Complications] : long term vascular complications of diabetes [Importance of Diet and Exercise] : importance of diet and exercise to improve glycemic control, achieve weight loss and improve cardiovascular health [Self Monitoring of Blood Glucose] : self monitoring of blood glucose [Retinopathy Screening] : Patient was referred to ophthalmology for retinopathy screening

## 2019-12-19 ENCOUNTER — APPOINTMENT (OUTPATIENT)
Dept: ENDOCRINOLOGY | Facility: CLINIC | Age: 69
End: 2019-12-19

## 2019-12-19 LAB
SAVE SPECIMEN: NORMAL
T4 FREE SERPL-MCNC: 1.6 NG/DL
T4 SERPL-MCNC: 10.5 UG/DL
TSH SERPL-ACNC: 0.86 UIU/ML

## 2019-12-23 LAB — THYROGLOB AB SERPL IA-ACNC: 34 IU/ML

## 2020-01-07 ENCOUNTER — TRANSCRIPTION ENCOUNTER (OUTPATIENT)
Age: 70
End: 2020-01-07

## 2020-01-08 ENCOUNTER — RX RENEWAL (OUTPATIENT)
Age: 70
End: 2020-01-08

## 2020-01-09 ENCOUNTER — MEDICATION RENEWAL (OUTPATIENT)
Age: 70
End: 2020-01-09

## 2020-02-18 ENCOUNTER — APPOINTMENT (OUTPATIENT)
Dept: ENDOCRINOLOGY | Facility: CLINIC | Age: 70
End: 2020-02-18
Payer: MEDICARE

## 2020-02-18 VITALS
HEIGHT: 65 IN | HEART RATE: 79 BPM | DIASTOLIC BLOOD PRESSURE: 83 MMHG | OXYGEN SATURATION: 97 % | SYSTOLIC BLOOD PRESSURE: 139 MMHG

## 2020-02-18 LAB
GLUCOSE BLDC GLUCOMTR-MCNC: 204
HBA1C MFR BLD HPLC: 9.5

## 2020-02-18 PROCEDURE — 82962 GLUCOSE BLOOD TEST: CPT

## 2020-02-18 PROCEDURE — 99213 OFFICE O/P EST LOW 20 MIN: CPT | Mod: 25

## 2020-02-18 PROCEDURE — 83036 HEMOGLOBIN GLYCOSYLATED A1C: CPT | Mod: QW

## 2020-02-27 ENCOUNTER — APPOINTMENT (OUTPATIENT)
Dept: ENDOCRINOLOGY | Facility: CLINIC | Age: 70
End: 2020-02-27
Payer: MEDICARE

## 2020-02-27 VITALS
SYSTOLIC BLOOD PRESSURE: 146 MMHG | HEIGHT: 65 IN | HEART RATE: 86 BPM | OXYGEN SATURATION: 96 % | DIASTOLIC BLOOD PRESSURE: 66 MMHG

## 2020-02-27 LAB — GLUCOSE BLDC GLUCOMTR-MCNC: 315

## 2020-02-27 PROCEDURE — 82962 GLUCOSE BLOOD TEST: CPT

## 2020-02-27 PROCEDURE — G0108 DIAB MANAGE TRN  PER INDIV: CPT

## 2020-02-27 RX ORDER — FLASH GLUCOSE SENSOR
KIT MISCELLANEOUS
Qty: 2 | Refills: 5 | Status: ACTIVE | COMMUNITY
Start: 2020-01-08 | End: 1900-01-01

## 2020-03-05 NOTE — ASSESSMENT
[Diabetes Foot Care] : diabetes foot care [Carbohydrate Consistent Diet] : carbohydrate consistent diet [Importance of Diet and Exercise] : importance of diet and exercise to improve glycemic control, achieve weight loss and improve cardiovascular health [Self Monitoring of Blood Glucose] : self monitoring of blood glucose [Long Term Vascular Complications] : long term vascular complications of diabetes [Retinopathy Screening] : Patient was referred to ophthalmology for retinopathy screening [Levothyroxine] : The patient was instructed to take Levothyroxine on an empty stomach, separate from vitamins, and wait at least 30 minutes before eating [FreeTextEntry1] : Patient generally does a neck sonogram yearly for her  history of papillary thyroid cancer. Given she is having some discomfort in her neck area we will do the sonogram earlier than usual. She is due for a regular followup next month and she does have an appointment with a diabetes educator in a few weeks. We briefly addressed  her A1c was 9 which she feels may be related to some of her new medications she is taking. She is not wearing her sensor today. She was recently vacationing in [place] which may also contribute to her higher A1C. \par To order DexCom she has Type 1 DM on MDI and checks her sugar 6- 10 times daily.

## 2020-03-05 NOTE — HISTORY OF PRESENT ILLNESS
[FreeTextEntry1] : Patient is here today for an acute visit. Her followup visit his maximum. She does have a history of thyroid cancer and more recently has been noticing increasing pressure in her neck. When she flexes and lower that dose feel uncomfortable. She denies any hoarseness or upper respiratory infection symptoms.Given her history of thyroid cancer she is nervous about this.

## 2020-03-05 NOTE — PHYSICAL EXAM
[Alert] : alert [No Acute Distress] : no acute distress [Thyroid Not Enlarged] : the thyroid was not enlarged [Well Healed Scar] : well healed scar [No Thyroid Nodules] : there were no palpable thyroid nodules [No Respiratory Distress] : no respiratory distress [Normal Rate and Effort] : normal respiratory rhythm and effort [Clear to Auscultation] : lungs were clear to auscultation bilaterally [Normal PMI] : the apical impulse was normal [Normal] : normal and non tender [Regular Rhythm] : with a regular rhythm [Normal Rate] : heart rate was normal  [Normal Insight/Judgement] : insight and judgment were intact [Oriented x3] : oriented to person, place, and time [Normal Affect] : the affect was normal [de-identified] : no masses or lymphadenopathy appreciated, non tender

## 2020-03-18 ENCOUNTER — LABORATORY RESULT (OUTPATIENT)
Age: 70
End: 2020-03-18

## 2020-03-18 ENCOUNTER — APPOINTMENT (OUTPATIENT)
Dept: ENDOCRINOLOGY | Facility: CLINIC | Age: 70
End: 2020-03-18
Payer: MEDICARE

## 2020-03-18 VITALS
HEART RATE: 76 BPM | WEIGHT: 200 LBS | OXYGEN SATURATION: 98 % | HEIGHT: 65 IN | BODY MASS INDEX: 33.32 KG/M2 | DIASTOLIC BLOOD PRESSURE: 84 MMHG | SYSTOLIC BLOOD PRESSURE: 140 MMHG

## 2020-03-18 LAB — GLUCOSE BLDC GLUCOMTR-MCNC: 277

## 2020-03-18 PROCEDURE — 36415 COLL VENOUS BLD VENIPUNCTURE: CPT

## 2020-03-18 PROCEDURE — 95251 CONT GLUC MNTR ANALYSIS I&R: CPT

## 2020-03-18 PROCEDURE — 82962 GLUCOSE BLOOD TEST: CPT

## 2020-03-18 PROCEDURE — 99214 OFFICE O/P EST MOD 30 MIN: CPT | Mod: 25

## 2020-03-18 NOTE — HISTORY OF PRESENT ILLNESS
[FreeTextEntry1] : Patient is here today for followup visit . She has  a history of thyroid cancer and more recently has been noticing increasing pressure in her neck. She denies any hoarseness or upper respiratory infection symptoms.Given her history of thyroid cancer she is nervous about this. A neck sonogram was done and except for some small  benign lymph nodes it was negative. \par She is currently on MDI for type 1 diabetes and uses the FreeStyle sensor; due to some episodes in the past of hypoglycemia unawareness and she lives alone, will try to get her her the  DexCom sensor. . [Date: ______] : [unfilled] [FreeTextEntry2] : 0.2 [de-identified] : total thyroidectomy [de-identified] : Papillary

## 2020-03-18 NOTE — PHYSICAL EXAM
[Alert] : alert [No Acute Distress] : no acute distress [Thyroid Not Enlarged] : the thyroid was not enlarged [Well Healed Scar] : well healed scar [No Thyroid Nodules] : there were no palpable thyroid nodules [No Respiratory Distress] : no respiratory distress [Normal Rate and Effort] : normal respiratory rhythm and effort [Clear to Auscultation] : lungs were clear to auscultation bilaterally [Normal PMI] : the apical impulse was normal [Normal Rate] : heart rate was normal  [Regular Rhythm] : with a regular rhythm [Normal] : normal and non tender [Oriented x3] : oriented to person, place, and time [Normal Insight/Judgement] : insight and judgment were intact [Normal Affect] : the affect was normal [de-identified] : no masses or lymphadenopathy appreciated, non tender

## 2020-03-18 NOTE — ASSESSMENT
[Carbohydrate Consistent Diet] : carbohydrate consistent diet [Diabetes Foot Care] : diabetes foot care [Long Term Vascular Complications] : long term vascular complications of diabetes [Importance of Diet and Exercise] : importance of diet and exercise to improve glycemic control, achieve weight loss and improve cardiovascular health [Action and use of Insulin] : action and use of short and long-acting insulin [Self Monitoring of Blood Glucose] : self monitoring of blood glucose [Insulin Self-Administration] : insulin self-administration [Retinopathy Screening] : Patient was referred to ophthalmology for retinopathy screening [FreeTextEntry1] : Patient generally does a neck sonogram yearly for her  history of papillary thyroid cancer. Given she is having some discomfort in her neck area we did do  the sonogram earlier than usual and was stable. \par She has type 1 Diabetes  and we did download her sensor today. She is using Toujeo 22 u and adjustment of NovoLog before meals.  Last visit her  A1c was 9 which she feels may be related to some of her new medications she is taking and recent travels. \par Sensor: TIR 72 % low under 70 6 % nine under 54 over 180 14 % and over 250 7 %. \par Recommend to avoid lows when not eating out to decrease Toujeo by 2-3 u. She did have sone significant PP spikes which most likely due  to do with inadequate boluses and carb counting. \par She will continue to monitor her BS 6 times daily and use 4 injections of insulin, before meals and HS. \par Labs were done in office today.  [Levothyroxine] : The patient was instructed to take Levothyroxine on an empty stomach, separate from vitamins, and wait at least 30 minutes before eating

## 2020-03-19 ENCOUNTER — APPOINTMENT (OUTPATIENT)
Dept: ENDOCRINOLOGY | Facility: CLINIC | Age: 70
End: 2020-03-19

## 2020-03-19 LAB
25(OH)D3 SERPL-MCNC: 39.3 NG/ML
ALBUMIN SERPL ELPH-MCNC: 4 G/DL
ALP BLD-CCNC: 87 U/L
ALT SERPL-CCNC: 11 U/L
ANION GAP SERPL CALC-SCNC: 14 MMOL/L
APPEARANCE: ABNORMAL
AST SERPL-CCNC: 16 U/L
BASOPHILS # BLD AUTO: 0.09 K/UL
BASOPHILS NFR BLD AUTO: 0.8 %
BILIRUB SERPL-MCNC: 1 MG/DL
BILIRUBIN URINE: NEGATIVE
BLOOD URINE: NEGATIVE
BUN SERPL-MCNC: 21 MG/DL
CALCIUM SERPL-MCNC: 9.7 MG/DL
CHLORIDE SERPL-SCNC: 103 MMOL/L
CHOLEST SERPL-MCNC: 149 MG/DL
CHOLEST/HDLC SERPL: 2.6 RATIO
CO2 SERPL-SCNC: 25 MMOL/L
COLOR: YELLOW
CREAT SERPL-MCNC: 0.69 MG/DL
CREAT SPEC-SCNC: 110 MG/DL
EOSINOPHIL # BLD AUTO: 0.17 K/UL
EOSINOPHIL NFR BLD AUTO: 1.5 %
ESTIMATED AVERAGE GLUCOSE: 214 MG/DL
GLUCOSE QUALITATIVE U: ABNORMAL
GLUCOSE SERPL-MCNC: 274 MG/DL
HBA1C MFR BLD HPLC: 9.1 %
HCT VFR BLD CALC: 42.1 %
HDLC SERPL-MCNC: 58 MG/DL
HGB BLD-MCNC: 13.2 G/DL
IMM GRANULOCYTES NFR BLD AUTO: 0.4 %
KETONES URINE: NEGATIVE
LDLC SERPL CALC-MCNC: 73 MG/DL
LEUKOCYTE ESTERASE URINE: ABNORMAL
LYMPHOCYTES # BLD AUTO: 1.69 K/UL
LYMPHOCYTES NFR BLD AUTO: 15.1 %
MAN DIFF?: NORMAL
MCHC RBC-ENTMCNC: 30.1 PG
MCHC RBC-ENTMCNC: 31.4 GM/DL
MCV RBC AUTO: 95.9 FL
MICROALBUMIN 24H UR DL<=1MG/L-MCNC: 1.4 MG/DL
MICROALBUMIN/CREAT 24H UR-RTO: 13 MG/G
MONOCYTES # BLD AUTO: 0.6 K/UL
MONOCYTES NFR BLD AUTO: 5.4 %
NEUTROPHILS # BLD AUTO: 8.56 K/UL
NEUTROPHILS NFR BLD AUTO: 76.8 %
NITRITE URINE: POSITIVE
PH URINE: 6
PLATELET # BLD AUTO: 235 K/UL
POTASSIUM SERPL-SCNC: 4.3 MMOL/L
PROT SERPL-MCNC: 6.2 G/DL
PROTEIN URINE: NORMAL
RBC # BLD: 4.39 M/UL
RBC # FLD: 14.5 %
SAVE SPECIMEN: NORMAL
SODIUM SERPL-SCNC: 141 MMOL/L
SPECIFIC GRAVITY URINE: 1.02
T4 FREE SERPL-MCNC: 1.7 NG/DL
T4 SERPL-MCNC: 10.1 UG/DL
TRIGL SERPL-MCNC: 92 MG/DL
TSH SERPL-ACNC: 0.15 UIU/ML
UROBILINOGEN URINE: NORMAL
WBC # FLD AUTO: 11.16 K/UL

## 2020-03-23 LAB — THYROGLOB AB SERPL IA-ACNC: 33 IU/ML

## 2020-03-26 RX ORDER — LOSARTAN POTASSIUM 100 MG/1
100 TABLET, FILM COATED ORAL DAILY
Qty: 90 | Refills: 1 | Status: ACTIVE | COMMUNITY
Start: 2020-01-14 | End: 1900-01-01

## 2020-05-04 DIAGNOSIS — Z12.39 ENCOUNTER FOR OTHER SCREENING FOR MALIGNANT NEOPLASM OF BREAST: ICD-10-CM

## 2020-06-16 ENCOUNTER — LABORATORY RESULT (OUTPATIENT)
Age: 70
End: 2020-06-16

## 2020-06-16 ENCOUNTER — APPOINTMENT (OUTPATIENT)
Dept: ENDOCRINOLOGY | Facility: CLINIC | Age: 70
End: 2020-06-16
Payer: MEDICARE

## 2020-06-16 VITALS
HEIGHT: 65 IN | OXYGEN SATURATION: 98 % | HEART RATE: 73 BPM | SYSTOLIC BLOOD PRESSURE: 146 MMHG | WEIGHT: 214 LBS | DIASTOLIC BLOOD PRESSURE: 82 MMHG | BODY MASS INDEX: 35.65 KG/M2

## 2020-06-16 LAB
GLUCOSE BLDC GLUCOMTR-MCNC: 176
HBA1C MFR BLD HPLC: 8

## 2020-06-16 PROCEDURE — 95251 CONT GLUC MNTR ANALYSIS I&R: CPT

## 2020-06-16 PROCEDURE — 99214 OFFICE O/P EST MOD 30 MIN: CPT | Mod: 25

## 2020-06-16 PROCEDURE — 83036 HEMOGLOBIN GLYCOSYLATED A1C: CPT | Mod: QW

## 2020-06-16 PROCEDURE — 36415 COLL VENOUS BLD VENIPUNCTURE: CPT

## 2020-06-16 PROCEDURE — 82962 GLUCOSE BLOOD TEST: CPT

## 2020-06-16 RX ORDER — GLYCOPYRRONIUM 2.4 G/100G
2.4 CLOTH TOPICAL
Refills: 0 | Status: ACTIVE | COMMUNITY

## 2020-06-16 NOTE — REASON FOR VISIT
[Follow - Up] : a follow-up visit [DM Type 1] : DM Type 1 [Hypothyroidism] : hypothyroidism [Thyroid Cancer] : thyroid cancer

## 2020-06-16 NOTE — ASSESSMENT
[FreeTextEntry1] : Patient generally does a neck sonogram yearly for her  history of papillary thyroid cancer. Given she is having some discomfort in her neck area we did do  the sonogram earlier than usual and was stable. TFTs and Tg checked today. \par She has type 1 Diabetes  and we did download her sensor today. She is using Toujeo 22 u and adjustment of Apidra before meals.  Last visit her  A1c was 9 which she feels may be related to some of her new medications she is taking and recent travels. Is now 8.0. \par commend to avoid lows when not eating out to decrease Toujeo by 2-3 u. She did have sone significant PP spikes which most likely due  to do with inadequate boluses and carb counting. Will make appointment with RD. To keep food diary and log amount of Apidra used at meal time.  \par She will continue to monitor her BS 6 times daily and use 4 injections of insulin, before meals and HS. \par Labs were done in office today. \par Consider change to DexCom and possibly go back on a pump.  [Diabetes Foot Care] : diabetes foot care [Long Term Vascular Complications] : long term vascular complications of diabetes [Carbohydrate Consistent Diet] : carbohydrate consistent diet [Hypoglycemia Management] : hypoglycemia management [Importance of Diet and Exercise] : importance of diet and exercise to improve glycemic control, achieve weight loss and improve cardiovascular health [Self Monitoring of Blood Glucose] : self monitoring of blood glucose [Retinopathy Screening] : Patient was referred to ophthalmology for retinopathy screening [Levothyroxine] : The patient was instructed to take Levothyroxine on an empty stomach, separate from vitamins, and wait at least 30 minutes before eating

## 2020-06-16 NOTE — PHYSICAL EXAM
[Alert] : alert [Well Nourished] : well nourished [No Acute Distress] : no acute distress [Well Developed] : well developed [Normal Sclera/Conjunctiva] : normal sclera/conjunctiva [No Proptosis] : no proptosis [EOMI] : extra ocular movement intact [Thyroid Not Enlarged] : the thyroid was not enlarged [Normal Oropharynx] : the oropharynx was normal [Well Healed Scar] : well healed scar [No Thyroid Nodules] : no palpable thyroid nodules [No Respiratory Distress] : no respiratory distress [No Accessory Muscle Use] : no accessory muscle use [Clear to Auscultation] : lungs were clear to auscultation bilaterally [Normal Rate] : heart rate was normal [Normal S1, S2] : normal S1 and S2 [Regular Rhythm] : with a regular rhythm [No Edema] : no peripheral edema [Pedal Pulses Normal] : the pedal pulses are present [Normal Bowel Sounds] : normal bowel sounds [Not Tender] : non-tender [Not Distended] : not distended [Soft] : abdomen soft [Normal Anterior Cervical Nodes] : no anterior cervical lymphadenopathy [No Spinal Tenderness] : no spinal tenderness [Normal Posterior Cervical Nodes] : no posterior cervical lymphadenopathy [No Stigmata of Cushings Syndrome] : no stigmata of Cushings Syndrome [Spine Straight] : spine straight [Normal Gait] : normal gait [Normal Strength/Tone] : muscle strength and tone were normal [No Rash] : no rash [Acanthosis Nigricans] : no acanthosis nigricans [Normal Reflexes] : deep tendon reflexes were 2+ and symmetric [No Tremors] : no tremors [Oriented x3] : oriented to person, place, and time [de-identified] : no masses appreciated

## 2020-06-16 NOTE — HISTORY OF PRESENT ILLNESS
[FreeTextEntry1] : Patient is here today for followup visit . She has  a history of thyroid cancer and more recently has been noticing increasing pressure in her neck. She denies any hoarseness or upper respiratory infection symptoms.Given her history of thyroid cancer she is nervous about this. A neck sonogram was done and except for some small  benign lymph nodes it was negative in February. . \par She is currently on MDI for type 1 diabetes and uses the FreeStyle sensor; due to some episodes in the past of hypoglycemia unawareness and she lives alone, will try to get her her the  DexCom sensor. .reconsidering going back on an insulin pump.  \sameera Saw Ophtho and neurologist for dilated right pupil was felt to be due to new medicine she started for sweating.  [Continuous Glucose Monitoring] : Continuous Glucose Monitoring: Yes [Kristie] : Kristie [FreeTextEntry2] : 52 [FreeTextEntry3] : 41 [FreeTextEntry4] : 7 [de-identified] : 7.4 [FreeTextEntry5] : 173 [FreeTextEntry6] : 43.3

## 2020-06-17 LAB
25(OH)D3 SERPL-MCNC: 34 NG/ML
ALBUMIN SERPL ELPH-MCNC: 4 G/DL
ALP BLD-CCNC: 91 U/L
ALT SERPL-CCNC: 23 U/L
ANION GAP SERPL CALC-SCNC: 15 MMOL/L
APPEARANCE: CLEAR
AST SERPL-CCNC: 32 U/L
BASOPHILS # BLD AUTO: 0.05 K/UL
BASOPHILS NFR BLD AUTO: 0.6 %
BILIRUB SERPL-MCNC: 0.4 MG/DL
BILIRUBIN URINE: NEGATIVE
BLOOD URINE: NEGATIVE
BUN SERPL-MCNC: 21 MG/DL
CALCIUM SERPL-MCNC: 9.4 MG/DL
CHLORIDE SERPL-SCNC: 103 MMOL/L
CO2 SERPL-SCNC: 24 MMOL/L
COLOR: YELLOW
CREAT SERPL-MCNC: 0.78 MG/DL
CREAT SPEC-SCNC: 203 MG/DL
EOSINOPHIL # BLD AUTO: 0.17 K/UL
EOSINOPHIL NFR BLD AUTO: 2 %
GLUCOSE QUALITATIVE U: NEGATIVE
GLUCOSE SERPL-MCNC: 156 MG/DL
HCT VFR BLD CALC: 41.2 %
HGB BLD-MCNC: 12.9 G/DL
IMM GRANULOCYTES NFR BLD AUTO: 0.5 %
KETONES URINE: NEGATIVE
LEUKOCYTE ESTERASE URINE: ABNORMAL
LYMPHOCYTES # BLD AUTO: 1.74 K/UL
LYMPHOCYTES NFR BLD AUTO: 20.2 %
MAN DIFF?: NORMAL
MCHC RBC-ENTMCNC: 30 PG
MCHC RBC-ENTMCNC: 31.3 GM/DL
MCV RBC AUTO: 95.8 FL
MICROALBUMIN 24H UR DL<=1MG/L-MCNC: 1.5 MG/DL
MICROALBUMIN/CREAT 24H UR-RTO: 7 MG/G
MONOCYTES # BLD AUTO: 0.57 K/UL
MONOCYTES NFR BLD AUTO: 6.6 %
NEUTROPHILS # BLD AUTO: 6.04 K/UL
NEUTROPHILS NFR BLD AUTO: 70.1 %
NITRITE URINE: NEGATIVE
PH URINE: 6.5
PLATELET # BLD AUTO: 199 K/UL
POTASSIUM SERPL-SCNC: 4.7 MMOL/L
PROT SERPL-MCNC: 6.2 G/DL
PROTEIN URINE: NORMAL
RBC # BLD: 4.3 M/UL
RBC # FLD: 14.8 %
SARS-COV-2 IGG SERPL IA-ACNC: <3.8 AU/ML
SARS-COV-2 IGG SERPL QL IA: NEGATIVE
SODIUM SERPL-SCNC: 142 MMOL/L
SPECIFIC GRAVITY URINE: 1.02
T4 FREE SERPL-MCNC: 1.3 NG/DL
T4 SERPL-MCNC: 7.8 UG/DL
TSH SERPL-ACNC: 0.48 UIU/ML
UROBILINOGEN URINE: NORMAL
WBC # FLD AUTO: 8.61 K/UL

## 2020-06-22 ENCOUNTER — APPOINTMENT (OUTPATIENT)
Dept: ENDOCRINOLOGY | Facility: CLINIC | Age: 70
End: 2020-06-22
Payer: MEDICARE

## 2020-06-22 ENCOUNTER — RX RENEWAL (OUTPATIENT)
Age: 70
End: 2020-06-22

## 2020-06-22 VITALS
HEIGHT: 65 IN | OXYGEN SATURATION: 93 % | SYSTOLIC BLOOD PRESSURE: 130 MMHG | DIASTOLIC BLOOD PRESSURE: 80 MMHG | HEART RATE: 85 BPM | WEIGHT: 214 LBS | BODY MASS INDEX: 35.65 KG/M2

## 2020-06-22 LAB — GLUCOSE BLDC GLUCOMTR-MCNC: 273

## 2020-06-22 PROCEDURE — 82962 GLUCOSE BLOOD TEST: CPT

## 2020-06-22 PROCEDURE — G0108 DIAB MANAGE TRN  PER INDIV: CPT

## 2020-06-24 LAB — THYROGLOB AB SERPL IA-ACNC: 34 IU/ML

## 2020-06-26 ENCOUNTER — APPOINTMENT (OUTPATIENT)
Dept: ENDOCRINOLOGY | Facility: CLINIC | Age: 70
End: 2020-06-26

## 2020-09-14 ENCOUNTER — NON-APPOINTMENT (OUTPATIENT)
Age: 70
End: 2020-09-14

## 2020-09-14 ENCOUNTER — LABORATORY RESULT (OUTPATIENT)
Age: 70
End: 2020-09-14

## 2020-09-14 ENCOUNTER — APPOINTMENT (OUTPATIENT)
Dept: ENDOCRINOLOGY | Facility: CLINIC | Age: 70
End: 2020-09-14
Payer: MEDICARE

## 2020-09-14 VITALS
HEIGHT: 65 IN | BODY MASS INDEX: 34.99 KG/M2 | HEART RATE: 74 BPM | OXYGEN SATURATION: 95 % | SYSTOLIC BLOOD PRESSURE: 146 MMHG | WEIGHT: 210 LBS | DIASTOLIC BLOOD PRESSURE: 73 MMHG

## 2020-09-14 LAB
GLUCOSE BLDC GLUCOMTR-MCNC: 182
HBA1C MFR BLD HPLC: 7.6

## 2020-09-14 PROCEDURE — 83036 HEMOGLOBIN GLYCOSYLATED A1C: CPT | Mod: QW

## 2020-09-14 PROCEDURE — 36415 COLL VENOUS BLD VENIPUNCTURE: CPT

## 2020-09-14 PROCEDURE — 82962 GLUCOSE BLOOD TEST: CPT | Mod: NC

## 2020-09-14 PROCEDURE — 99214 OFFICE O/P EST MOD 30 MIN: CPT | Mod: 25

## 2020-09-14 PROCEDURE — 95251 CONT GLUC MNTR ANALYSIS I&R: CPT

## 2020-09-14 RX ORDER — AZITHROMYCIN 250 MG/1
250 TABLET, FILM COATED ORAL
Qty: 1 | Refills: 0 | Status: DISCONTINUED | COMMUNITY
Start: 2019-09-03 | End: 2020-09-14

## 2020-09-14 RX ORDER — CLONAZEPAM 0.5 MG/1
0.5 TABLET ORAL
Refills: 0 | Status: ACTIVE | COMMUNITY

## 2020-09-14 RX ORDER — PREDNISONE 50 MG/1
50 TABLET ORAL
Qty: 3 | Refills: 0 | Status: DISCONTINUED | COMMUNITY
Start: 2020-08-11 | End: 2020-09-14

## 2020-09-14 RX ORDER — ONDANSETRON 4 MG/1
4 TABLET ORAL
Qty: 30 | Refills: 0 | Status: DISCONTINUED | COMMUNITY
Start: 2017-07-18 | End: 2020-09-14

## 2020-09-14 RX ORDER — DOXYCYCLINE HYCLATE 100 MG/1
100 CAPSULE ORAL
Qty: 14 | Refills: 0 | Status: DISCONTINUED | COMMUNITY
Start: 2020-07-20 | End: 2020-09-14

## 2020-09-14 RX ORDER — DICLOFENAC SODIUM 50 MG/1
50 TABLET, DELAYED RELEASE ORAL
Qty: 60 | Refills: 0 | Status: ACTIVE | COMMUNITY
Start: 2020-06-04

## 2020-09-14 RX ORDER — AMOXICILLIN 875 MG/1
875 TABLET, FILM COATED ORAL
Qty: 14 | Refills: 0 | Status: DISCONTINUED | COMMUNITY
Start: 2020-03-23 | End: 2020-09-14

## 2020-09-14 RX ORDER — CLINDAMYCIN HYDROCHLORIDE 300 MG/1
300 CAPSULE ORAL
Qty: 8 | Refills: 0 | Status: DISCONTINUED | COMMUNITY
Start: 2020-06-10 | End: 2020-09-14

## 2020-09-14 RX ORDER — AMLODIPINE BESYLATE 2.5 MG/1
2.5 TABLET ORAL
Refills: 0 | Status: ACTIVE | COMMUNITY
Start: 2020-08-31

## 2020-09-14 RX ORDER — IRBESARTAN 150 MG/1
150 TABLET ORAL DAILY
Qty: 90 | Refills: 3 | Status: COMPLETED | COMMUNITY
Start: 2019-06-26 | End: 2020-09-14

## 2020-09-14 NOTE — REASON FOR VISIT
[Follow - Up] : a follow-up visit [Hypothyroidism] : hypothyroidism [Thyroid Cancer] : thyroid cancer [DM Type 1] : DM Type 1

## 2020-09-15 LAB
SAVE SPECIMEN: NORMAL
T4 FREE SERPL-MCNC: 1.5 NG/DL
T4 SERPL-MCNC: 8.2 UG/DL
TSH SERPL-ACNC: 0.95 UIU/ML

## 2020-09-23 LAB — THYROGLOB AB SERPL IA-ACNC: 34 IU/ML

## 2020-10-01 ENCOUNTER — APPOINTMENT (OUTPATIENT)
Dept: ENDOCRINOLOGY | Facility: CLINIC | Age: 70
End: 2020-10-01
Payer: MEDICARE

## 2020-10-01 VITALS — HEIGHT: 65 IN | WEIGHT: 210 LBS | BODY MASS INDEX: 34.99 KG/M2

## 2020-10-01 LAB — GLUCOSE BLDC GLUCOMTR-MCNC: 80

## 2020-10-01 PROCEDURE — G0108 DIAB MANAGE TRN  PER INDIV: CPT

## 2020-10-01 PROCEDURE — 95251 CONT GLUC MNTR ANALYSIS I&R: CPT

## 2020-10-01 RX ORDER — BLOOD-GLUCOSE,RECEIVER,CONT
EACH MISCELLANEOUS
Qty: 1 | Refills: 1 | Status: ACTIVE | COMMUNITY
Start: 2020-10-01

## 2020-11-09 NOTE — HISTORY OF PRESENT ILLNESS
[Continuous Glucose Monitoring] : Continuous Glucose Monitoring: Yes [Kristie] : Kristie [FreeTextEntry1] : Patient is here today for followup visit . She has  a history of thyroid cancer and more recently has been noticing increasing pressure in her neck. She denies any hoarseness or upper respiratory infection symptoms.Given her history of thyroid cancer she is nervous about this. A neck sonogram was done and except for some small  benign lymph nodes it was negative in February and stable in July. . . \par She is currently on MDI for type 1 diabetes and uses the FreeStyle sensor; due to some episodes in the past of hypoglycemia unawareness and she lives alone, will try to get her her the  DexCom sensor. .reconsidering going back on an insulin pump.  \par Saw Ophth and neurologist for dilated right pupil was felt to be due to new medicine she started for sweating. She is being treated for Parkinsons.  [FreeTextEntry2] : 55 [FreeTextEntry3] : 38 [FreeTextEntry4] : 2 [de-identified] : 7.2 [FreeTextEntry5] : 164

## 2020-11-09 NOTE — ASSESSMENT
[Diabetes Foot Care] : diabetes foot care [Long Term Vascular Complications] : long term vascular complications of diabetes [Carbohydrate Consistent Diet] : carbohydrate consistent diet [Self Monitoring of Blood Glucose] : self monitoring of blood glucose [Retinopathy Screening] : Patient was referred to ophthalmology for retinopathy screening [Levothyroxine] : The patient was instructed to take Levothyroxine on an empty stomach, separate from vitamins, and wait at least 30 minutes before eating [FreeTextEntry1] : Patient generally does a neck sonogram yearly for her  history of papillary thyroid cancer. Given she is having some discomfort in her neck area we did do  the sonogram earlier than usual and was stable. TFTs and Tg checked today. \par She has type 1 Diabetes  and we did download her sensor today. She is using Toujeo 22 u and adjustment of Apidra before meals based on her blood sugars and food intake. .  Last visit her  A1c was 9  and is now 7.8\par Recommend to avoid lows when not eating out to decrease Toujeo by 2-3 u. She did have sone significant PP spikes which most likely due  to do with inadequate boluses and carb counting. Will make appointment with RD. To keep food diary and log amount of Apidra used at meal time.  Uses target of 150 due to episodes of hypoglycemia. \par She will continue to monitor her BS 6 times daily and use 4 injections of insulin, before meals and HS. \par Labs were done in office today. \par Consider change to DexCom and possibly go back on a pump. Kristie sensor has upgraded version with warning of low sugar. Will discuss with CDE.

## 2020-11-09 NOTE — PHYSICAL EXAM
[Alert] : alert [Well Nourished] : well nourished [Well Developed] : well developed [No Acute Distress] : no acute distress [Normal Sclera/Conjunctiva] : normal sclera/conjunctiva [Normal Oropharynx] : the oropharynx was normal [EOMI] : extra ocular movement intact [No Proptosis] : no proptosis [No Thyroid Nodules] : no palpable thyroid nodules [Well Healed Scar] : well healed scar [Thyroid Not Enlarged] : the thyroid was not enlarged [No Respiratory Distress] : no respiratory distress [No Accessory Muscle Use] : no accessory muscle use [Clear to Auscultation] : lungs were clear to auscultation bilaterally [Normal S1, S2] : normal S1 and S2 [Regular Rhythm] : with a regular rhythm [Normal Rate] : heart rate was normal [No Edema] : no peripheral edema [Pedal Pulses Normal] : the pedal pulses are present [Normal Bowel Sounds] : normal bowel sounds [Not Tender] : non-tender [Soft] : abdomen soft [Not Distended] : not distended [Normal Anterior Cervical Nodes] : no anterior cervical lymphadenopathy [No Spinal Tenderness] : no spinal tenderness [Spine Straight] : spine straight [No Stigmata of Cushings Syndrome] : no stigmata of Cushings Syndrome [Normal Gait] : normal gait [No Rash] : no rash [Normal Strength/Tone] : muscle strength and tone were normal [Normal Reflexes] : deep tendon reflexes were 2+ and symmetric [No Tremors] : no tremors [Oriented x3] : oriented to person, place, and time [Acanthosis Nigricans] : no acanthosis nigricans [de-identified] : no masses appreciated

## 2020-11-10 ENCOUNTER — APPOINTMENT (OUTPATIENT)
Dept: ENDOCRINOLOGY | Facility: CLINIC | Age: 70
End: 2020-11-10

## 2020-11-17 ENCOUNTER — RX RENEWAL (OUTPATIENT)
Age: 70
End: 2020-11-17

## 2020-12-15 ENCOUNTER — APPOINTMENT (OUTPATIENT)
Dept: ENDOCRINOLOGY | Facility: CLINIC | Age: 70
End: 2020-12-15
Payer: MEDICARE

## 2020-12-15 PROCEDURE — 99072 ADDL SUPL MATRL&STAF TM PHE: CPT

## 2020-12-15 PROCEDURE — 99213 OFFICE O/P EST LOW 20 MIN: CPT

## 2020-12-15 NOTE — HISTORY OF PRESENT ILLNESS
[FreeTextEntry1] : Patient is here today for followup visit . She has  a history of thyroid cancer and more recently has been noticing increasing pressure in her neck. She denies any hoarseness or upper respiratory infection symptoms.Given her history of thyroid cancer she is nervous about this. A neck sonogram was done and except for some small  benign lymph nodes it was negative in February and stable in July. . . \par She is currently on MDI for type 1 diabetes and uses the FreeStyle sensor; due to some episodes in the past of hypoglycemia unawareness and she lives alone, she now has DexCom sensor but were not able to download. .reconsidering going back on an insulin pump.  \par She fell last week will be going for Xray because may have fractured a rib. She has a basal cell skin lesion on  her nose that will be removed next week.

## 2020-12-15 NOTE — PHYSICAL EXAM
[Alert] : alert [Well Nourished] : well nourished [No Acute Distress] : no acute distress [Well Developed] : well developed [Normal Sclera/Conjunctiva] : normal sclera/conjunctiva [EOMI] : extra ocular movement intact [No Proptosis] : no proptosis [Normal Oropharynx] : the oropharynx was normal [Well Healed Scar] : well healed scar [Normal S1, S2] : normal S1 and S2 [Normal Rate] : heart rate was normal [Regular Rhythm] : with a regular rhythm [No Edema] : no peripheral edema [Pedal Pulses Normal] : the pedal pulses are present [Normal Bowel Sounds] : normal bowel sounds [Not Tender] : non-tender [Not Distended] : not distended [Soft] : abdomen soft [Normal Anterior Cervical Nodes] : no anterior cervical lymphadenopathy [No Spinal Tenderness] : no spinal tenderness [Spine Straight] : spine straight [Acanthosis Nigricans] : no acanthosis nigricans [Oriented x3] : oriented to person, place, and time [de-identified] : Limited exam due to Telehealth visit secondary to Covid pandemic

## 2020-12-15 NOTE — ASSESSMENT
[FreeTextEntry1] : Patient generally does a neck sonogram yearly for her  history of papillary thyroid cancer. Given she is having some discomfort in her neck area we did do  the sonogram earlier than usual and was stable. T\par She has type 1 Diabetes and is now considering the Tandem pump, she is using the DexCom sensor but we were not able to download. She will f/u in office next month.  [Diabetes Foot Care] : diabetes foot care [Long Term Vascular Complications] : long term vascular complications of diabetes [Carbohydrate Consistent Diet] : carbohydrate consistent diet [Importance of Diet and Exercise] : importance of diet and exercise to improve glycemic control, achieve weight loss and improve cardiovascular health [Hypoglycemia Management] : hypoglycemia management [Self Monitoring of Blood Glucose] : self monitoring of blood glucose [Retinopathy Screening] : Patient was referred to ophthalmology for retinopathy screening [Levothyroxine] : The patient was instructed to take Levothyroxine on an empty stomach, separate from vitamins, and wait at least 30 minutes before eating

## 2020-12-17 ENCOUNTER — NON-APPOINTMENT (OUTPATIENT)
Age: 70
End: 2020-12-17

## 2021-01-04 ENCOUNTER — RX RENEWAL (OUTPATIENT)
Age: 71
End: 2021-01-04

## 2021-02-01 ENCOUNTER — APPOINTMENT (OUTPATIENT)
Dept: ENDOCRINOLOGY | Facility: CLINIC | Age: 71
End: 2021-02-01
Payer: MEDICARE

## 2021-02-01 DIAGNOSIS — A09 INFECTIOUS GASTROENTERITIS AND COLITIS, UNSPECIFIED: ICD-10-CM

## 2021-02-01 PROCEDURE — 99443: CPT | Mod: 95

## 2021-03-09 ENCOUNTER — APPOINTMENT (OUTPATIENT)
Dept: INTERNAL MEDICINE | Facility: CLINIC | Age: 71
End: 2021-03-09
Payer: MEDICARE

## 2021-03-09 VITALS — SYSTOLIC BLOOD PRESSURE: 138 MMHG | DIASTOLIC BLOOD PRESSURE: 78 MMHG

## 2021-03-09 VITALS
WEIGHT: 220 LBS | TEMPERATURE: 97.8 F | HEIGHT: 65 IN | BODY MASS INDEX: 36.65 KG/M2 | HEART RATE: 118 BPM | OXYGEN SATURATION: 96 %

## 2021-03-09 PROCEDURE — 99204 OFFICE O/P NEW MOD 45 MIN: CPT

## 2021-03-09 NOTE — PHYSICAL EXAM
[Normal] : soft, non-tender, non-distended, no masses palpated, no HSM and normal bowel sounds near syncope

## 2021-03-09 NOTE — HISTORY OF PRESENT ILLNESS
[FreeTextEntry8] : endo notes reviewed--prior hx pap thyroid cancer with excision 20 yrs ago\par sees neuro (Nathalie)--hx parkinsons\par Had cardiac angiogram 2020 ([facility]) \par hx melanoma on left lower leg--excised--sees Dr Santiago at [facility]\par was seeing DR. Juan in  [place]\par no retinopathy\par got double covid vaccine

## 2021-03-11 ENCOUNTER — LABORATORY RESULT (OUTPATIENT)
Age: 71
End: 2021-03-11

## 2021-03-14 ENCOUNTER — LABORATORY RESULT (OUTPATIENT)
Age: 71
End: 2021-03-14

## 2021-03-15 LAB
25(OH)D3 SERPL-MCNC: 37.9 NG/ML
ALBUMIN SERPL ELPH-MCNC: 4 G/DL
ALP BLD-CCNC: 115 U/L
ALT SERPL-CCNC: 26 U/L
ANION GAP SERPL CALC-SCNC: 13 MMOL/L
APPEARANCE: CLEAR
AST SERPL-CCNC: 17 U/L
BASOPHILS # BLD AUTO: 0.1 K/UL
BASOPHILS NFR BLD AUTO: 0.7 %
BILIRUB SERPL-MCNC: 0.6 MG/DL
BILIRUBIN URINE: NEGATIVE
BLOOD URINE: NEGATIVE
BUN SERPL-MCNC: 26 MG/DL
C PEPTIDE SERPL-MCNC: <0.1 NG/ML
CALCIUM SERPL-MCNC: 9.6 MG/DL
CHLORIDE SERPL-SCNC: 104 MMOL/L
CHOLEST SERPL-MCNC: 143 MG/DL
CO2 SERPL-SCNC: 23 MMOL/L
COLOR: YELLOW
CREAT SERPL-MCNC: 0.79 MG/DL
EOSINOPHIL # BLD AUTO: 0.16 K/UL
EOSINOPHIL NFR BLD AUTO: 1.1 %
ESTIMATED AVERAGE GLUCOSE: 209 MG/DL
GLUCOSE BLDC GLUCOMTR-MCNC: 113
GLUCOSE QUALITATIVE U: ABNORMAL
GLUCOSE SERPL-MCNC: 113 MG/DL
HBA1C MFR BLD HPLC: 8.9 %
HCT VFR BLD CALC: 42.8 %
HDLC SERPL-MCNC: 71 MG/DL
HGB BLD-MCNC: 13.3 G/DL
IMM GRANULOCYTES NFR BLD AUTO: 0.4 %
KETONES URINE: NORMAL
LDLC SERPL CALC-MCNC: 57 MG/DL
LEUKOCYTE ESTERASE URINE: ABNORMAL
LYMPHOCYTES # BLD AUTO: 2.45 K/UL
LYMPHOCYTES NFR BLD AUTO: 16.9 %
MAN DIFF?: NORMAL
MCHC RBC-ENTMCNC: 29.6 PG
MCHC RBC-ENTMCNC: 31.1 GM/DL
MCV RBC AUTO: 95.1 FL
MONOCYTES # BLD AUTO: 1.33 K/UL
MONOCYTES NFR BLD AUTO: 9.2 %
NEUTROPHILS # BLD AUTO: 10.43 K/UL
NEUTROPHILS NFR BLD AUTO: 71.7 %
NITRITE URINE: NEGATIVE
NONHDLC SERPL-MCNC: 72 MG/DL
PH URINE: 6.5
PLATELET # BLD AUTO: 289 K/UL
POTASSIUM SERPL-SCNC: 4.2 MMOL/L
PROT SERPL-MCNC: 6.5 G/DL
PROTEIN URINE: NORMAL
RBC # BLD: 4.5 M/UL
RBC # FLD: 15.2 %
SODIUM SERPL-SCNC: 140 MMOL/L
SPECIFIC GRAVITY URINE: 1.02
T4 FREE SERPL-MCNC: 1.4 NG/DL
T4 SERPL-MCNC: 8.2 UG/DL
TRIGL SERPL-MCNC: 73 MG/DL
TSH SERPL-ACNC: 1.09 UIU/ML
UROBILINOGEN URINE: NORMAL
WBC # FLD AUTO: 14.53 K/UL

## 2021-03-22 LAB — THYROGLOB AB SERPL IA-ACNC: 9 IU/ML

## 2021-03-25 ENCOUNTER — APPOINTMENT (OUTPATIENT)
Dept: ENDOCRINOLOGY | Facility: CLINIC | Age: 71
End: 2021-03-25
Payer: MEDICARE

## 2021-03-25 VITALS
HEART RATE: 88 BPM | DIASTOLIC BLOOD PRESSURE: 80 MMHG | HEIGHT: 65 IN | BODY MASS INDEX: 35.82 KG/M2 | WEIGHT: 215 LBS | TEMPERATURE: 96.9 F | OXYGEN SATURATION: 98 % | SYSTOLIC BLOOD PRESSURE: 142 MMHG

## 2021-03-25 LAB — GLUCOSE BLDC GLUCOMTR-MCNC: 180

## 2021-03-25 PROCEDURE — 99214 OFFICE O/P EST MOD 30 MIN: CPT | Mod: 25

## 2021-03-25 PROCEDURE — 82962 GLUCOSE BLOOD TEST: CPT

## 2021-03-25 NOTE — ASSESSMENT
[FreeTextEntry1] : Patient generally does a neck sonogram yearly for her  history of papillary thyroid cancer. No change in Tirosint dose.Tg ab lower, \par  She has type 1 Diabetes and is now considering the Tandem pump, she is using the  Kristie sensor but no data since received new one. Unfortunately did not get Kristie 2. To see CDE one month. \par Encourage healthy lifestyle including a low carb diet and exercise as tolerated. Monitor blood sugars as directed and bring meter/sensor   to all visits.\par Due for BD in June.  [Diabetes Foot Care] : diabetes foot care [Long Term Vascular Complications] : long term vascular complications of diabetes [Carbohydrate Consistent Diet] : carbohydrate consistent diet [Importance of Diet and Exercise] : importance of diet and exercise to improve glycemic control, achieve weight loss and improve cardiovascular health [Glucagon Use] : glucagon use [Retinopathy Screening] : Patient was referred to ophthalmology for retinopathy screening [Diabetic Medications] : Risks and benefits of diabetic medications were discussed [Levothyroxine] : The patient was instructed to take Levothyroxine on an empty stomach, separate from vitamins, and wait at least 30 minutes before eating

## 2021-03-25 NOTE — HISTORY OF PRESENT ILLNESS
[FreeTextEntry1] : Patient is here today for followup visit . She has  a history of thyroid cancer . She denies any hoarseness or upper respiratory infection symptoms.Given her history of thyroid cancer she is nervous about this. A neck sonogram was done and except for some small  benign lymph nodes it was negative in February and stable in July 2020. . . . \par She is currently on MDI for type 1 diabetes and uses the FreeStyle sensor; due to some episodes in the past of hypoglycemia unawareness and she lives alone, she now has Kristie sensor but no data since had broken and just resumed with a new one. \par She recently saw a neuroophthalmologist for dilated left pupil. She had an MRA done and is waiting for the results.

## 2021-03-25 NOTE — PHYSICAL EXAM
[Alert] : alert [Well Nourished] : well nourished [No Acute Distress] : no acute distress [Well Developed] : well developed [Normal Sclera/Conjunctiva] : normal sclera/conjunctiva [EOMI] : extra ocular movement intact [No Proptosis] : no proptosis [Normal Oropharynx] : the oropharynx was normal [Well Healed Scar] : well healed scar [No Respiratory Distress] : no respiratory distress [Clear to Auscultation] : lungs were clear to auscultation bilaterally [Normal PMI] : the apical impulse was normal [Normal S1, S2] : normal S1 and S2 [Normal Rate] : heart rate was normal [Regular Rhythm] : with a regular rhythm [No Edema] : no peripheral edema [Pedal Pulses Normal] : the pedal pulses are present [Normal Bowel Sounds] : normal bowel sounds [Not Tender] : non-tender [Not Distended] : not distended [Soft] : abdomen soft [Normal Anterior Cervical Nodes] : no anterior cervical lymphadenopathy [No Spinal Tenderness] : no spinal tenderness [Spine Straight] : spine straight [Acanthosis Nigricans] : no acanthosis nigricans [Normal Reflexes] : deep tendon reflexes were 2+ and symmetric [Oriented x3] : oriented to person, place, and time [Normal Insight/Judgement] : insight and judgment were intact

## 2021-04-01 ENCOUNTER — APPOINTMENT (OUTPATIENT)
Dept: FAMILY MEDICINE | Facility: CLINIC | Age: 71
End: 2021-04-01
Payer: MEDICARE

## 2021-04-01 ENCOUNTER — NON-APPOINTMENT (OUTPATIENT)
Age: 71
End: 2021-04-01

## 2021-04-01 VITALS
DIASTOLIC BLOOD PRESSURE: 82 MMHG | HEART RATE: 95 BPM | SYSTOLIC BLOOD PRESSURE: 140 MMHG | HEIGHT: 65 IN | BODY MASS INDEX: 35.82 KG/M2 | OXYGEN SATURATION: 95 % | WEIGHT: 215 LBS | TEMPERATURE: 98.1 F

## 2021-04-01 DIAGNOSIS — Z00.00 ENCOUNTER FOR GENERAL ADULT MEDICAL EXAMINATION W/OUT ABNORMAL FINDINGS: ICD-10-CM

## 2021-04-01 PROCEDURE — 99213 OFFICE O/P EST LOW 20 MIN: CPT

## 2021-04-01 RX ORDER — LEVOCETIRIZINE DIHYDROCHLORIDE 5 MG/1
5 TABLET ORAL
Refills: 0 | Status: ACTIVE | COMMUNITY

## 2021-04-01 RX ORDER — MIRABEGRON 50 MG/1
50 TABLET, FILM COATED, EXTENDED RELEASE ORAL
Refills: 0 | Status: ACTIVE | COMMUNITY

## 2021-04-01 RX ORDER — DULOXETINE HYDROCHLORIDE 60 MG/1
60 CAPSULE, DELAYED RELEASE PELLETS ORAL
Qty: 90 | Refills: 2 | Status: DISCONTINUED | COMMUNITY
Start: 2019-08-30 | End: 2021-04-01

## 2021-04-01 RX ORDER — SACCHAROMYCES BOULARDII 50 MG
CAPSULE ORAL
Refills: 0 | Status: ACTIVE | COMMUNITY

## 2021-04-14 ENCOUNTER — APPOINTMENT (OUTPATIENT)
Dept: ENDOCRINOLOGY | Facility: CLINIC | Age: 71
End: 2021-04-14
Payer: MEDICARE

## 2021-04-14 PROCEDURE — 95251 CONT GLUC MNTR ANALYSIS I&R: CPT

## 2021-04-14 PROCEDURE — G0108 DIAB MANAGE TRN  PER INDIV: CPT

## 2021-04-15 DIAGNOSIS — D72.829 ELEVATED WHITE BLOOD CELL COUNT, UNSPECIFIED: ICD-10-CM

## 2021-04-19 ENCOUNTER — NON-APPOINTMENT (OUTPATIENT)
Age: 71
End: 2021-04-19

## 2021-04-19 LAB
BASOPHILS # BLD AUTO: 0.11 K/UL
BASOPHILS NFR BLD AUTO: 1 %
EOSINOPHIL # BLD AUTO: 0.16 K/UL
EOSINOPHIL NFR BLD AUTO: 1.5 %
HCT VFR BLD CALC: 41.3 %
HGB BLD-MCNC: 12.6 G/DL
IMM GRANULOCYTES NFR BLD AUTO: 0.5 %
LYMPHOCYTES # BLD AUTO: 2.16 K/UL
LYMPHOCYTES NFR BLD AUTO: 20 %
MAN DIFF?: NORMAL
MCHC RBC-ENTMCNC: 29.2 PG
MCHC RBC-ENTMCNC: 30.5 GM/DL
MCV RBC AUTO: 95.6 FL
MONOCYTES # BLD AUTO: 0.81 K/UL
MONOCYTES NFR BLD AUTO: 7.5 %
NEUTROPHILS # BLD AUTO: 7.51 K/UL
NEUTROPHILS NFR BLD AUTO: 69.5 %
PLATELET # BLD AUTO: 299 K/UL
RBC # BLD: 4.32 M/UL
RBC # FLD: 14.6 %
WBC # FLD AUTO: 10.8 K/UL

## 2021-04-23 ENCOUNTER — NON-APPOINTMENT (OUTPATIENT)
Age: 71
End: 2021-04-23

## 2021-04-23 DIAGNOSIS — R39.15 URGENCY OF URINATION: ICD-10-CM

## 2021-04-27 PROBLEM — R39.15 URGENCY OF URINATION: Status: ACTIVE | Noted: 2021-04-27

## 2021-05-05 ENCOUNTER — NON-APPOINTMENT (OUTPATIENT)
Age: 71
End: 2021-05-05

## 2021-05-06 LAB
APPEARANCE: CLEAR
BILIRUBIN URINE: NEGATIVE
BLOOD URINE: NEGATIVE
COLOR: COLORLESS
GLUCOSE QUALITATIVE U: NEGATIVE
KETONES URINE: NEGATIVE
LEUKOCYTE ESTERASE URINE: NEGATIVE
NITRITE URINE: NEGATIVE
PH URINE: 6
PROTEIN URINE: NEGATIVE
SPECIFIC GRAVITY URINE: 1.01
UROBILINOGEN URINE: NORMAL

## 2021-06-07 ENCOUNTER — RX RENEWAL (OUTPATIENT)
Age: 71
End: 2021-06-07

## 2021-06-23 ENCOUNTER — NON-APPOINTMENT (OUTPATIENT)
Age: 71
End: 2021-06-23

## 2021-06-28 ENCOUNTER — APPOINTMENT (OUTPATIENT)
Dept: ENDOCRINOLOGY | Facility: CLINIC | Age: 71
End: 2021-06-28

## 2021-07-22 ENCOUNTER — LABORATORY RESULT (OUTPATIENT)
Age: 71
End: 2021-07-22

## 2021-07-22 ENCOUNTER — APPOINTMENT (OUTPATIENT)
Dept: ENDOCRINOLOGY | Facility: CLINIC | Age: 71
End: 2021-07-22
Payer: MEDICARE

## 2021-07-22 VITALS
TEMPERATURE: 98.2 F | OXYGEN SATURATION: 96 % | SYSTOLIC BLOOD PRESSURE: 143 MMHG | DIASTOLIC BLOOD PRESSURE: 84 MMHG | HEIGHT: 65 IN | HEART RATE: 89 BPM

## 2021-07-22 LAB
GLUCOSE BLDC GLUCOMTR-MCNC: 200
HBA1C MFR BLD HPLC: 9.6

## 2021-07-22 PROCEDURE — 83036 HEMOGLOBIN GLYCOSYLATED A1C: CPT | Mod: QW

## 2021-07-22 PROCEDURE — 95251 CONT GLUC MNTR ANALYSIS I&R: CPT

## 2021-07-22 PROCEDURE — 36415 COLL VENOUS BLD VENIPUNCTURE: CPT

## 2021-07-22 PROCEDURE — 99214 OFFICE O/P EST MOD 30 MIN: CPT | Mod: 25

## 2021-07-26 LAB
25(OH)D3 SERPL-MCNC: 35.6 NG/ML
ALBUMIN SERPL ELPH-MCNC: 4.1 G/DL
ALP BLD-CCNC: 106 U/L
ALT SERPL-CCNC: 10 U/L
ANION GAP SERPL CALC-SCNC: 12 MMOL/L
APPEARANCE: ABNORMAL
AST SERPL-CCNC: 19 U/L
BASOPHILS # BLD AUTO: 0.09 K/UL
BASOPHILS NFR BLD AUTO: 0.9 %
BILIRUB SERPL-MCNC: 1 MG/DL
BILIRUBIN URINE: NEGATIVE
BLOOD URINE: NEGATIVE
BUN SERPL-MCNC: 28 MG/DL
CALCIUM SERPL-MCNC: 9.6 MG/DL
CHLORIDE SERPL-SCNC: 104 MMOL/L
CHOLEST SERPL-MCNC: 133 MG/DL
CO2 SERPL-SCNC: 23 MMOL/L
COLOR: ABNORMAL
CREAT SERPL-MCNC: 0.72 MG/DL
CREAT SPEC-SCNC: 88 MG/DL
EOSINOPHIL # BLD AUTO: 0.25 K/UL
EOSINOPHIL NFR BLD AUTO: 2.5 %
GLUCOSE QUALITATIVE U: NORMAL
GLUCOSE SERPL-MCNC: 165 MG/DL
HCT VFR BLD CALC: 40.8 %
HDLC SERPL-MCNC: 67 MG/DL
HGB BLD-MCNC: 12.6 G/DL
IMM GRANULOCYTES NFR BLD AUTO: 0.4 %
KETONES URINE: NEGATIVE
LDLC SERPL CALC-MCNC: 52 MG/DL
LEUKOCYTE ESTERASE URINE: ABNORMAL
LYMPHOCYTES # BLD AUTO: 1.84 K/UL
LYMPHOCYTES NFR BLD AUTO: 18.3 %
MAN DIFF?: NORMAL
MCHC RBC-ENTMCNC: 29 PG
MCHC RBC-ENTMCNC: 30.9 GM/DL
MCV RBC AUTO: 93.8 FL
MICROALBUMIN 24H UR DL<=1MG/L-MCNC: <1.2 MG/DL
MICROALBUMIN/CREAT 24H UR-RTO: NORMAL MG/G
MONOCYTES # BLD AUTO: 0.75 K/UL
MONOCYTES NFR BLD AUTO: 7.4 %
NEUTROPHILS # BLD AUTO: 7.1 K/UL
NEUTROPHILS NFR BLD AUTO: 70.5 %
NITRITE URINE: NEGATIVE
NONHDLC SERPL-MCNC: 67 MG/DL
PH URINE: 6
PLATELET # BLD AUTO: 298 K/UL
POTASSIUM SERPL-SCNC: 4.6 MMOL/L
PROT SERPL-MCNC: 6.4 G/DL
PROTEIN URINE: NORMAL
RBC # BLD: 4.35 M/UL
RBC # FLD: 15.8 %
SODIUM SERPL-SCNC: 139 MMOL/L
SPECIFIC GRAVITY URINE: 1.03
T4 FREE SERPL-MCNC: 1.6 NG/DL
T4 SERPL-MCNC: 9.2 UG/DL
TRIGL SERPL-MCNC: 72 MG/DL
TSH SERPL-ACNC: 0.38 UIU/ML
UROBILINOGEN URINE: NORMAL
WBC # FLD AUTO: 10.07 K/UL

## 2021-07-29 LAB — THYROGLOB AB SERPL IA-ACNC: 6.3 IU/ML

## 2021-08-02 RX ORDER — ELUXADOLINE 75 MG/1
75 TABLET, FILM COATED ORAL
Refills: 0 | Status: ACTIVE | COMMUNITY
Start: 2021-08-02

## 2021-08-03 ENCOUNTER — APPOINTMENT (OUTPATIENT)
Dept: INTERNAL MEDICINE | Facility: CLINIC | Age: 71
End: 2021-08-03

## 2021-08-03 RX ORDER — INSULIN GLULISINE 100 [IU]/ML
100 INJECTION, SOLUTION SUBCUTANEOUS
Qty: 2 | Refills: 5 | Status: ACTIVE | COMMUNITY
Start: 2021-08-02 | End: 1900-01-01

## 2021-08-26 NOTE — PHYSICAL EXAM
[Alert] : alert [Well Nourished] : well nourished [No Acute Distress] : no acute distress [Well Developed] : well developed [Normal Sclera/Conjunctiva] : normal sclera/conjunctiva [EOMI] : extra ocular movement intact [No Proptosis] : no proptosis [Normal Oropharynx] : the oropharynx was normal [Well Healed Scar] : well healed scar [No Respiratory Distress] : no respiratory distress [Clear to Auscultation] : lungs were clear to auscultation bilaterally [Normal PMI] : the apical impulse was normal [Normal S1, S2] : normal S1 and S2 [Normal Rate] : heart rate was normal [Regular Rhythm] : with a regular rhythm [No Edema] : no peripheral edema [Pedal Pulses Normal] : the pedal pulses are present [Normal Bowel Sounds] : normal bowel sounds [Not Tender] : non-tender [Not Distended] : not distended [Soft] : abdomen soft [Normal Anterior Cervical Nodes] : no anterior cervical lymphadenopathy [No Spinal Tenderness] : no spinal tenderness [Spine Straight] : spine straight [Normal Reflexes] : deep tendon reflexes were 2+ and symmetric [Oriented x3] : oriented to person, place, and time [Normal Insight/Judgement] : insight and judgment were intact [Acanthosis Nigricans] : no acanthosis nigricans

## 2021-08-26 NOTE — HISTORY OF PRESENT ILLNESS
[FreeTextEntry1] : Patient is here today for followup visit . She has  a history of thyroid cancer . She denies any hoarseness or upper respiratory infection symptoms.Given her history of thyroid cancer she is nervous about this. A neck sonogram was done and except for some small  benign lymph nodes it was negative in February and stable in July 2020. . . . \par She is currently on MDI for type 1 diabetes and uses the FreeStyle sensor; due to some episodes in the past of hypoglycemia unawareness and she lives alone, she now has Kristie sensor.. \par She recently saw a neuroophthalmologist for dilated left pupil. She had an MRA done and is waiting for the results. [FreeTextEntry2] : 63 [FreeTextEntry4] : 6 [FreeTextEntry3] : 13+8 [de-identified] : 6.5 [FreeTextEntry5] : 133

## 2021-08-26 NOTE — ASSESSMENT
[Diabetes Foot Care] : diabetes foot care [Long Term Vascular Complications] : long term vascular complications of diabetes [Carbohydrate Consistent Diet] : carbohydrate consistent diet [Importance of Diet and Exercise] : importance of diet and exercise to improve glycemic control, achieve weight loss and improve cardiovascular health [Hypoglycemia Management] : hypoglycemia management [Self Monitoring of Blood Glucose] : self monitoring of blood glucose [Retinopathy Screening] : Patient was referred to ophthalmology for retinopathy screening [Diabetic Medications] : Risks and benefits of diabetic medications were discussed [Levothyroxine] : The patient was instructed to take Levothyroxine on an empty stomach, separate from vitamins, and wait at least 30 minutes before eating [FreeTextEntry1] : Patient generally does a neck sonogram yearly for her  history of papillary thyroid cancer. No change in Tirosint dose.Tg ab lower, Recent neck sonogram stable. \par She has type 1 Diabetes and is now considering the Tandem pump, she is using the  Kristie sensor  Unfortunately did not get Kristie 2.  Now trying to get DexCom. Will lower basal insulin and have HS snack especially when sees it dropping on the sensor. Continue to take 4 injections of insulin daily. \par Encourage healthy lifestyle including a low carb diet and exercise as tolerated. Monitor blood sugars as directed and bring meter/sensor   to all visits.\par BD done but need complete report. \par f/u CDE in one mos. Plans to have a ERCP for a pancreatic cyst. \par Labs done today

## 2021-09-11 DIAGNOSIS — Z01.818 ENCOUNTER FOR OTHER PREPROCEDURAL EXAMINATION: ICD-10-CM

## 2021-09-13 ENCOUNTER — TRANSCRIPTION ENCOUNTER (OUTPATIENT)
Age: 71
End: 2021-09-13

## 2021-09-13 ENCOUNTER — APPOINTMENT (OUTPATIENT)
Dept: DISASTER EMERGENCY | Facility: CLINIC | Age: 71
End: 2021-09-13

## 2021-09-16 ENCOUNTER — RESULT REVIEW (OUTPATIENT)
Age: 71
End: 2021-09-16

## 2021-09-16 ENCOUNTER — OUTPATIENT (OUTPATIENT)
Dept: OUTPATIENT SERVICES | Facility: HOSPITAL | Age: 71
LOS: 1 days | End: 2021-09-16
Payer: MEDICARE

## 2021-09-16 VITALS
HEART RATE: 87 BPM | OXYGEN SATURATION: 97 % | SYSTOLIC BLOOD PRESSURE: 147 MMHG | RESPIRATION RATE: 16 BRPM | DIASTOLIC BLOOD PRESSURE: 78 MMHG

## 2021-09-16 VITALS
SYSTOLIC BLOOD PRESSURE: 131 MMHG | RESPIRATION RATE: 17 BRPM | TEMPERATURE: 97 F | OXYGEN SATURATION: 94 % | WEIGHT: 210.1 LBS | HEIGHT: 55 IN | DIASTOLIC BLOOD PRESSURE: 69 MMHG | HEART RATE: 81 BPM

## 2021-09-16 DIAGNOSIS — Z47.1 AFTERCARE FOLLOWING JOINT REPLACEMENT SURGERY: Chronic | ICD-10-CM

## 2021-09-16 DIAGNOSIS — Z98.890 OTHER SPECIFIED POSTPROCEDURAL STATES: Chronic | ICD-10-CM

## 2021-09-16 DIAGNOSIS — K86.2 CYST OF PANCREAS: ICD-10-CM

## 2021-09-16 LAB
GLUCOSE BLDC GLUCOMTR-MCNC: 47 MG/DL — CRITICAL LOW (ref 70–99)
GLUCOSE BLDC GLUCOMTR-MCNC: 60 MG/DL — LOW (ref 70–99)
GLUCOSE BLDC GLUCOMTR-MCNC: 60 MG/DL — LOW (ref 70–99)
GLUCOSE BLDC GLUCOMTR-MCNC: 65 MG/DL — LOW (ref 70–99)
GLUCOSE BLDC GLUCOMTR-MCNC: 90 MG/DL — SIGNIFICANT CHANGE UP (ref 70–99)

## 2021-09-16 PROCEDURE — 82962 GLUCOSE BLOOD TEST: CPT

## 2021-09-16 PROCEDURE — 88312 SPECIAL STAINS GROUP 1: CPT

## 2021-09-16 PROCEDURE — 43242 EGD US FINE NEEDLE BX/ASPIR: CPT

## 2021-09-16 PROCEDURE — 43239 EGD BIOPSY SINGLE/MULTIPLE: CPT | Mod: XS

## 2021-09-16 PROCEDURE — 88173 CYTOPATH EVAL FNA REPORT: CPT | Mod: 26

## 2021-09-16 PROCEDURE — 88173 CYTOPATH EVAL FNA REPORT: CPT

## 2021-09-16 PROCEDURE — 82378 CARCINOEMBRYONIC ANTIGEN: CPT

## 2021-09-16 PROCEDURE — 88305 TISSUE EXAM BY PATHOLOGIST: CPT

## 2021-09-16 PROCEDURE — 88312 SPECIAL STAINS GROUP 1: CPT | Mod: 26

## 2021-09-16 PROCEDURE — 88305 TISSUE EXAM BY PATHOLOGIST: CPT | Mod: 26

## 2021-09-16 RX ORDER — SODIUM CHLORIDE 9 MG/ML
500 INJECTION INTRAMUSCULAR; INTRAVENOUS; SUBCUTANEOUS
Refills: 0 | Status: COMPLETED | OUTPATIENT
Start: 2021-09-16 | End: 2021-09-16

## 2021-09-16 RX ORDER — SODIUM CHLORIDE 9 MG/ML
100 INJECTION, SOLUTION INTRAVENOUS
Refills: 0 | Status: DISCONTINUED | OUTPATIENT
Start: 2021-09-16 | End: 2021-09-30

## 2021-09-16 RX ORDER — DEXTROSE 50 % IN WATER 50 %
25 SYRINGE (ML) INTRAVENOUS ONCE
Refills: 0 | Status: COMPLETED | OUTPATIENT
Start: 2021-09-16 | End: 2021-09-16

## 2021-09-16 RX ADMIN — Medication 25 MILLILITER(S): at 11:56

## 2021-09-16 RX ADMIN — SODIUM CHLORIDE 150 MILLILITER(S): 9 INJECTION, SOLUTION INTRAVENOUS at 11:05

## 2021-09-16 RX ADMIN — SODIUM CHLORIDE 75 MILLILITER(S): 9 INJECTION INTRAMUSCULAR; INTRAVENOUS; SUBCUTANEOUS at 11:59

## 2021-09-16 NOTE — PRE-ANESTHESIA EVALUATION ADULT - NSANTHOSAYNRD_GEN_A_CORE
No. CRYSTAL screening performed.  STOP BANG Legend: 0-2 = LOW Risk; 3-4 = INTERMEDIATE Risk; 5-8 = HIGH Risk none

## 2021-09-16 NOTE — PRE PROCEDURE NOTE - PRE PROCEDURE EVALUATION
Attending Physician:     Lg Steel MD                       Procedure:    Indication for Procedure:  ________________________________________________________  PAST MEDICAL & SURGICAL HISTORY:  Thyroid cancer    H/O thyroidectomy    Melanoma  left leg    H/O basal cell carcinoma excision    Aftercare following right shoulder joint replacement surgery      ALLERGIES:  Ceclor (Unknown)  iodine containing compounds (Unknown)  shellfish (Unknown)    HOME MEDICATIONS:  Apidra OptiClik Cartridge 100 units/mL subcutaneous solution: taken as needed based on glucose level  atorvastatin 40 mg oral tablet: 1 tab(s) orally once a day  carbidopa-levodopa 25 mg-100 mg oral tablet: orally 2 times a day  DULoxetine 60 mg oral delayed release capsule: 1 cap(s) orally once a day  irbesartan 150 mg oral tablet: 1 tab(s) orally once a day  pantoprazole 40 mg oral delayed release tablet: 1 tab(s) orally once a day  rasagiline 1 mg oral tablet: 1 tab(s) orally once a day  Tirosint 125 mcg (0.125 mg) oral capsule: 1 cap(s) orally once a day  Toujeo SoloStar 300 units/mL subcutaneous solution:   Vitamin D3 400 intl units (10 mcg) oral tablet: 1 tab(s) orally once a day    AICD/PPM: [ ] yes   [ ] no    PERTINENT LAB DATA:                      PHYSICAL EXAMINATION:    Height (cm): 167.6  Weight (kg): 95.3  BMI (kg/m2): 33.9  BSA (m2): 2.04T(C): 36.2  HR: 81  BP: 131/69  RR: 17  SpO2: 94%    Constitutional: NAD  HEENT: PERRLA, EOMI,    Neck:  No JVD  Respiratory: CTAB/L  Cardiovascular: S1 and S2  Gastrointestinal: BS+, soft, NT/ND  Extremities: No peripheral edema  Neurological: A/O x 3, no focal deficits  Psychiatric: Normal mood, normal affect  Skin: No rashes    ASA Class: I [ ]  II [ ]  III [x ]  IV [ ]    COMMENTS:    The patient is a suitable candidate for the planned procedure unless box checked [ ]  No, explain:

## 2021-09-16 NOTE — ASU DISCHARGE PLAN (ADULT/PEDIATRIC) - CARE PROVIDER_API CALL
Lg Steel)  Gastroenterology  [address], [address]  [place] [address]  Phone: [phone]  Fax: [phone]  Follow Up Time:

## 2021-09-16 NOTE — ASU DISCHARGE PLAN (ADULT/PEDIATRIC) - COMMENTS
follow up results by telehealth  sept 23 at 7:15 pm.  I will call you between 4-7 pm.  take cipro 1 tablet twice a day for 3 days starting tomorrow.

## 2021-09-16 NOTE — ASU PATIENT PROFILE, ADULT - NSICDXPASTSURGICALHX_GEN_ALL_CORE_FT
PAST SURGICAL HISTORY:  Aftercare following right shoulder joint replacement surgery     H/O basal cell carcinoma excision

## 2021-09-17 LAB — NON-GYNECOLOGICAL CYTOLOGY STUDY: SIGNIFICANT CHANGE UP

## 2021-09-20 LAB
CEA FLD-MCNC: <0.5 NG/ML — SIGNIFICANT CHANGE UP
SPECIMEN SOURCE FLD: SIGNIFICANT CHANGE UP

## 2021-09-21 ENCOUNTER — NON-APPOINTMENT (OUTPATIENT)
Age: 71
End: 2021-09-21

## 2021-09-21 LAB — SURGICAL PATHOLOGY STUDY: SIGNIFICANT CHANGE UP

## 2021-09-23 ENCOUNTER — APPOINTMENT (OUTPATIENT)
Dept: ENDOCRINOLOGY | Facility: CLINIC | Age: 71
End: 2021-09-23

## 2021-09-29 PROBLEM — E89.0 POSTPROCEDURAL HYPOTHYROIDISM: Chronic | Status: ACTIVE | Noted: 2021-09-16

## 2021-09-29 PROBLEM — C43.9 MALIGNANT MELANOMA OF SKIN, UNSPECIFIED: Chronic | Status: ACTIVE | Noted: 2021-09-16

## 2021-09-29 PROBLEM — C73 MALIGNANT NEOPLASM OF THYROID GLAND: Chronic | Status: ACTIVE | Noted: 2021-09-16

## 2021-09-30 ENCOUNTER — APPOINTMENT (OUTPATIENT)
Dept: ENDOCRINOLOGY | Facility: CLINIC | Age: 71
End: 2021-09-30

## 2021-10-04 ENCOUNTER — APPOINTMENT (OUTPATIENT)
Dept: FAMILY MEDICINE | Facility: CLINIC | Age: 71
End: 2021-10-04

## 2021-10-07 ENCOUNTER — APPOINTMENT (OUTPATIENT)
Dept: ENDOCRINOLOGY | Facility: CLINIC | Age: 71
End: 2021-10-07
Payer: MEDICARE

## 2021-10-07 ENCOUNTER — NON-APPOINTMENT (OUTPATIENT)
Age: 71
End: 2021-10-07

## 2021-10-07 ENCOUNTER — LABORATORY RESULT (OUTPATIENT)
Age: 71
End: 2021-10-07

## 2021-10-07 VITALS
SYSTOLIC BLOOD PRESSURE: 144 MMHG | OXYGEN SATURATION: 96 % | HEIGHT: 65 IN | HEART RATE: 86 BPM | DIASTOLIC BLOOD PRESSURE: 81 MMHG | WEIGHT: 220 LBS | BODY MASS INDEX: 36.65 KG/M2

## 2021-10-07 DIAGNOSIS — Z23 ENCOUNTER FOR IMMUNIZATION: ICD-10-CM

## 2021-10-07 DIAGNOSIS — K86.9 DISEASE OF PANCREAS, UNSPECIFIED: ICD-10-CM

## 2021-10-07 LAB
GLUCOSE BLDC GLUCOMTR-MCNC: 114
HBA1C MFR BLD HPLC: 10.2

## 2021-10-07 PROCEDURE — G0008: CPT

## 2021-10-07 PROCEDURE — 95251 CONT GLUC MNTR ANALYSIS I&R: CPT

## 2021-10-07 PROCEDURE — 99214 OFFICE O/P EST MOD 30 MIN: CPT | Mod: 25

## 2021-10-07 PROCEDURE — 36415 COLL VENOUS BLD VENIPUNCTURE: CPT

## 2021-10-07 PROCEDURE — 90662 IIV NO PRSV INCREASED AG IM: CPT

## 2021-10-07 PROCEDURE — 83036 HEMOGLOBIN GLYCOSYLATED A1C: CPT | Mod: QW

## 2021-10-07 NOTE — ASSESSMENT
[FreeTextEntry1] : 1)  rhytid cancer history/hypothyroidism  - Patient generally does a neck sonogram yearly for her  history of papillary thyroid cancer. No change in Tirosint dose.TG ab lower, Recent neck sonogram stable. Check TFTs today. \par 2)  type 1 Diabetes  - and is now considering the Tandem pump, she is using the  Kristie sensor  Unfortunately did not get Kristie 2.  Now trying to get DexCom. Will lower basal insulin and have HS snack especially when sees it dropping on the sensor. Continue to take 4 injections of insulin daily. Monitors sugar 6-8 times daily. \par Encourage healthy lifestyle including a low carb diet and exercise as tolerated. Monitor blood sugars as directed and bring meter/sensor   to all visits.\par 3) BD done but need complete report. Lowest site RFN   T score - 2.0 \par f/u CDE in one mos. \par Labs done today  [Diabetes Foot Care] : diabetes foot care [Long Term Vascular Complications] : long term vascular complications of diabetes [Carbohydrate Consistent Diet] : carbohydrate consistent diet [Importance of Diet and Exercise] : importance of diet and exercise to improve glycemic control, achieve weight loss and improve cardiovascular health [Hypoglycemia Management] : hypoglycemia management [Self Monitoring of Blood Glucose] : self monitoring of blood glucose [Retinopathy Screening] : Patient was referred to ophthalmology for retinopathy screening

## 2021-10-07 NOTE — HISTORY OF PRESENT ILLNESS
[FreeTextEntry1] : Patient is here today for followup visit . She has  a history of thyroid cancer . She denies any hoarseness or upper respiratory infection symptoms.Given her history of thyroid cancer she is nervous about this. A neck sonogram was done and except for some small  benign lymph nodes it was negative in February and stable in July 2020 and June 2021. . . . . \par She is currently on MDI for type 1 diabetes and uses the FreeStyle sensor; due to some episodes in the past of hypoglycemia unawareness and she lives alone, she now has Kristie sensor.. \par She recently saw a neuroophthalmologist for dilated left pupil. She had an MRA done.\par Yesterday at GI BP was high. C.O some edema which may be due to amlodipine. Had pancreatic cyst removed and was benign.  [Continuous Glucose Monitoring] : Continuous Glucose Monitoring: Yes [Kristie] : Kristie [FreeTextEntry2] : 48 [FreeTextEntry3] : 28+17 [FreeTextEntry4] : 1 [de-identified] : 7.6 [FreeTextEntry5] : 178 [FreeTextEntry6] : 39.8

## 2021-10-08 ENCOUNTER — LABORATORY RESULT (OUTPATIENT)
Age: 71
End: 2021-10-08

## 2021-10-08 ENCOUNTER — APPOINTMENT (OUTPATIENT)
Dept: ENDOCRINOLOGY | Facility: CLINIC | Age: 71
End: 2021-10-08
Payer: MEDICARE

## 2021-10-08 PROCEDURE — 97802 MEDICAL NUTRITION INDIV IN: CPT | Mod: 95

## 2021-10-12 LAB
25(OH)D3 SERPL-MCNC: 40.9 NG/ML
ALBUMIN SERPL ELPH-MCNC: 4 G/DL
ALP BLD-CCNC: 131 U/L
ALT SERPL-CCNC: 30 U/L
AMYLASE/CREAT SERPL: 14 U/L
ANION GAP SERPL CALC-SCNC: 15 MMOL/L
APPEARANCE: ABNORMAL
AST SERPL-CCNC: 36 U/L
BASOPHILS # BLD AUTO: 0.1 K/UL
BASOPHILS NFR BLD AUTO: 1 %
BILIRUB SERPL-MCNC: 0.6 MG/DL
BILIRUBIN URINE: NEGATIVE
BLOOD URINE: ABNORMAL
BUN SERPL-MCNC: 23 MG/DL
CALCIUM SERPL-MCNC: 10 MG/DL
CHLORIDE SERPL-SCNC: 103 MMOL/L
CO2 SERPL-SCNC: 24 MMOL/L
COLOR: YELLOW
CREAT SERPL-MCNC: 0.94 MG/DL
CREAT SPEC-SCNC: 150 MG/DL
EOSINOPHIL # BLD AUTO: 0.25 K/UL
EOSINOPHIL NFR BLD AUTO: 2.5 %
ESTIMATED AVERAGE GLUCOSE: 258 MG/DL
GLUCOSE QUALITATIVE U: NEGATIVE
GLUCOSE SERPL-MCNC: 76 MG/DL
HBA1C MFR BLD HPLC: 10.6 %
HCT VFR BLD CALC: 40.6 %
HGB BLD-MCNC: 12.5 G/DL
IMM GRANULOCYTES NFR BLD AUTO: 0.5 %
KETONES URINE: NORMAL
LEUKOCYTE ESTERASE URINE: ABNORMAL
LPL SERPL-CCNC: 20 U/L
LYMPHOCYTES # BLD AUTO: 2.29 K/UL
LYMPHOCYTES NFR BLD AUTO: 22.6 %
MAN DIFF?: NORMAL
MCHC RBC-ENTMCNC: 28.7 PG
MCHC RBC-ENTMCNC: 30.8 GM/DL
MCV RBC AUTO: 93.3 FL
MICROALBUMIN 24H UR DL<=1MG/L-MCNC: 2.6 MG/DL
MICROALBUMIN/CREAT 24H UR-RTO: 17 MG/G
MONOCYTES # BLD AUTO: 0.74 K/UL
MONOCYTES NFR BLD AUTO: 7.3 %
NEUTROPHILS # BLD AUTO: 6.7 K/UL
NEUTROPHILS NFR BLD AUTO: 66.1 %
NITRITE URINE: NEGATIVE
PH URINE: 6
PLATELET # BLD AUTO: 279 K/UL
POTASSIUM SERPL-SCNC: 4.2 MMOL/L
PROT SERPL-MCNC: 6.5 G/DL
PROTEIN URINE: NEGATIVE
RBC # BLD: 4.35 M/UL
RBC # FLD: 15.9 %
SODIUM SERPL-SCNC: 142 MMOL/L
SPECIFIC GRAVITY URINE: 1.02
T4 FREE SERPL-MCNC: 1.4 NG/DL
T4 SERPL-MCNC: 9.1 UG/DL
TSH SERPL-ACNC: 1 UIU/ML
UROBILINOGEN URINE: NORMAL
WBC # FLD AUTO: 10.13 K/UL

## 2021-10-13 LAB — THYROGLOB AB SERPL IA-ACNC: 6.2 IU/ML

## 2021-10-28 ENCOUNTER — APPOINTMENT (OUTPATIENT)
Dept: ENDOCRINOLOGY | Facility: CLINIC | Age: 71
End: 2021-10-28
Payer: COMMERCIAL

## 2021-10-28 VITALS
HEART RATE: 98 BPM | OXYGEN SATURATION: 97 % | HEIGHT: 65 IN | BODY MASS INDEX: 36.65 KG/M2 | DIASTOLIC BLOOD PRESSURE: 78 MMHG | SYSTOLIC BLOOD PRESSURE: 152 MMHG | WEIGHT: 220 LBS

## 2021-10-28 DIAGNOSIS — F41.9 ANXIETY DISORDER, UNSPECIFIED: ICD-10-CM

## 2021-10-28 DIAGNOSIS — F32.A ANXIETY DISORDER, UNSPECIFIED: ICD-10-CM

## 2021-10-28 LAB — GLUCOSE BLDC GLUCOMTR-MCNC: 414

## 2021-10-28 PROCEDURE — P0004: CPT

## 2021-10-28 RX ORDER — DULOXETINE HYDROCHLORIDE 60 MG/1
60 CAPSULE, DELAYED RELEASE PELLETS ORAL
Qty: 90 | Refills: 3 | Status: DISCONTINUED | COMMUNITY
End: 2021-10-28

## 2021-10-28 RX ORDER — ESCITALOPRAM OXALATE 10 MG/1
10 TABLET ORAL DAILY
Refills: 0 | Status: ACTIVE | COMMUNITY
Start: 2021-10-28

## 2021-11-03 ENCOUNTER — APPOINTMENT (OUTPATIENT)
Dept: ENDOCRINOLOGY | Facility: CLINIC | Age: 71
End: 2021-11-03
Payer: MEDICARE

## 2021-11-03 VITALS
WEIGHT: 220 LBS | SYSTOLIC BLOOD PRESSURE: 145 MMHG | BODY MASS INDEX: 36.65 KG/M2 | OXYGEN SATURATION: 96 % | HEIGHT: 65 IN | HEART RATE: 70 BPM | DIASTOLIC BLOOD PRESSURE: 64 MMHG

## 2021-11-03 LAB — GLUCOSE BLDC GLUCOMTR-MCNC: 140

## 2021-11-03 PROCEDURE — G0108 DIAB MANAGE TRN  PER INDIV: CPT

## 2021-11-03 PROCEDURE — 82962 GLUCOSE BLOOD TEST: CPT

## 2021-12-13 ENCOUNTER — RX RENEWAL (OUTPATIENT)
Age: 71
End: 2021-12-13

## 2021-12-16 RX ORDER — INSULIN LISPRO 100 [IU]/ML
100 INJECTION, SOLUTION INTRAVENOUS; SUBCUTANEOUS
Qty: 7 | Refills: 1 | Status: ACTIVE | COMMUNITY
Start: 2021-10-20

## 2021-12-20 ENCOUNTER — INPATIENT (INPATIENT)
Facility: HOSPITAL | Age: 71
LOS: 10 days | Discharge: NURSING, REHAB OR LONG-TERM CARE | End: 2021-12-31
Attending: HOSPITALIST | Admitting: HOSPITALIST
Payer: MEDICARE

## 2021-12-20 VITALS
OXYGEN SATURATION: 98 % | DIASTOLIC BLOOD PRESSURE: 65 MMHG | RESPIRATION RATE: 22 BRPM | HEIGHT: 66 IN | HEART RATE: 120 BPM | SYSTOLIC BLOOD PRESSURE: 132 MMHG | TEMPERATURE: 99 F

## 2021-12-20 DIAGNOSIS — Z98.890 OTHER SPECIFIED POSTPROCEDURAL STATES: Chronic | ICD-10-CM

## 2021-12-20 DIAGNOSIS — E11.10 TYPE 2 DIABETES MELLITUS WITH KETOACIDOSIS WITHOUT COMA: ICD-10-CM

## 2021-12-20 DIAGNOSIS — Z47.1 AFTERCARE FOLLOWING JOINT REPLACEMENT SURGERY: Chronic | ICD-10-CM

## 2021-12-20 LAB
A1C WITH ESTIMATED AVERAGE GLUCOSE RESULT: 9.4 % — HIGH (ref 4–5.6)
ALBUMIN SERPL ELPH-MCNC: 3.7 G/DL — SIGNIFICANT CHANGE UP (ref 3.3–5)
ALP SERPL-CCNC: 115 U/L — SIGNIFICANT CHANGE UP (ref 40–120)
ALT FLD-CCNC: 6 U/L — SIGNIFICANT CHANGE UP (ref 4–33)
ANION GAP SERPL CALC-SCNC: 37 MMOL/L — HIGH (ref 7–14)
APPEARANCE UR: CLEAR — SIGNIFICANT CHANGE UP
AST SERPL-CCNC: 38 U/L — HIGH (ref 4–32)
B-OH-BUTYR SERPL-SCNC: >9 MMOL/L — SIGNIFICANT CHANGE UP (ref 0–0.4)
BACTERIA # UR AUTO: NEGATIVE — SIGNIFICANT CHANGE UP
BASE EXCESS BLDV CALC-SCNC: -18.7 MMOL/L — LOW (ref -2–3)
BASOPHILS # BLD AUTO: 0.03 K/UL — SIGNIFICANT CHANGE UP (ref 0–0.2)
BASOPHILS NFR BLD AUTO: 0.1 % — SIGNIFICANT CHANGE UP (ref 0–2)
BILIRUB SERPL-MCNC: 0.7 MG/DL — SIGNIFICANT CHANGE UP (ref 0.2–1.2)
BILIRUB UR-MCNC: NEGATIVE — SIGNIFICANT CHANGE UP
BLOOD GAS VENOUS COMPREHENSIVE RESULT: SIGNIFICANT CHANGE UP
BUN SERPL-MCNC: 63 MG/DL — HIGH (ref 7–23)
CALCIUM SERPL-MCNC: 9.6 MG/DL — SIGNIFICANT CHANGE UP (ref 8.4–10.5)
CHLORIDE BLDV-SCNC: 93 MMOL/L — LOW (ref 96–108)
CHLORIDE SERPL-SCNC: 88 MMOL/L — LOW (ref 98–107)
CO2 BLDV-SCNC: 9.8 MMOL/L — LOW (ref 22–26)
CO2 SERPL-SCNC: 8 MMOL/L — CRITICAL LOW (ref 22–31)
COLOR SPEC: SIGNIFICANT CHANGE UP
CREAT SERPL-MCNC: 2.14 MG/DL — HIGH (ref 0.5–1.3)
DIFF PNL FLD: NEGATIVE — SIGNIFICANT CHANGE UP
EOSINOPHIL # BLD AUTO: 0.01 K/UL — SIGNIFICANT CHANGE UP (ref 0–0.5)
EOSINOPHIL NFR BLD AUTO: 0 % — SIGNIFICANT CHANGE UP (ref 0–6)
EPI CELLS # UR: 4 /HPF — SIGNIFICANT CHANGE UP (ref 0–5)
ESTIMATED AVERAGE GLUCOSE: 223 — SIGNIFICANT CHANGE UP
GAS PNL BLDV: 130 MMOL/L — LOW (ref 136–145)
GLUCOSE BLDC GLUCOMTR-MCNC: >600 MG/DL — CRITICAL HIGH (ref 70–99)
GLUCOSE BLDV-MCNC: >685 MG/DL — SIGNIFICANT CHANGE UP (ref 70–99)
GLUCOSE SERPL-MCNC: 1029 MG/DL — CRITICAL HIGH (ref 70–99)
GLUCOSE UR QL: ABNORMAL
HCO3 BLDV-SCNC: 9 MMOL/L — CRITICAL LOW (ref 22–29)
HCT VFR BLD CALC: 38.6 % — SIGNIFICANT CHANGE UP (ref 34.5–45)
HCT VFR BLDA CALC: 35 % — SIGNIFICANT CHANGE UP (ref 34.5–46.5)
HGB BLD CALC-MCNC: 11.6 G/DL — SIGNIFICANT CHANGE UP (ref 11.5–15.5)
HGB BLD-MCNC: 11.7 G/DL — SIGNIFICANT CHANGE UP (ref 11.5–15.5)
HYALINE CASTS # UR AUTO: 1 /LPF — SIGNIFICANT CHANGE UP (ref 0–7)
IANC: 22 K/UL — HIGH (ref 1.5–8.5)
IMM GRANULOCYTES NFR BLD AUTO: 0.9 % — SIGNIFICANT CHANGE UP (ref 0–1.5)
KETONES UR-MCNC: ABNORMAL
LACTATE BLDV-MCNC: 6 MMOL/L — CRITICAL HIGH (ref 0.5–2)
LEUKOCYTE ESTERASE UR-ACNC: NEGATIVE — SIGNIFICANT CHANGE UP
LYMPHOCYTES # BLD AUTO: 0.91 K/UL — LOW (ref 1–3.3)
LYMPHOCYTES # BLD AUTO: 3.7 % — LOW (ref 13–44)
MAGNESIUM SERPL-MCNC: 2.7 MG/DL — HIGH (ref 1.6–2.6)
MCHC RBC-ENTMCNC: 28.5 PG — SIGNIFICANT CHANGE UP (ref 27–34)
MCHC RBC-ENTMCNC: 30.3 GM/DL — LOW (ref 32–36)
MCV RBC AUTO: 94.1 FL — SIGNIFICANT CHANGE UP (ref 80–100)
MONOCYTES # BLD AUTO: 1.32 K/UL — HIGH (ref 0–0.9)
MONOCYTES NFR BLD AUTO: 5.4 % — SIGNIFICANT CHANGE UP (ref 2–14)
NEUTROPHILS # BLD AUTO: 22 K/UL — HIGH (ref 1.8–7.4)
NEUTROPHILS NFR BLD AUTO: 89.9 % — HIGH (ref 43–77)
NITRITE UR-MCNC: NEGATIVE — SIGNIFICANT CHANGE UP
NRBC # BLD: 0 /100 WBCS — SIGNIFICANT CHANGE UP
NRBC # FLD: 0 K/UL — SIGNIFICANT CHANGE UP
PCO2 BLDV: 27 MMHG — LOW (ref 39–42)
PH BLDV: 7.13 — LOW (ref 7.32–7.43)
PH UR: 5.5 — SIGNIFICANT CHANGE UP (ref 5–8)
PHOSPHATE SERPL-MCNC: 7.6 MG/DL — HIGH (ref 2.5–4.5)
PLATELET # BLD AUTO: 288 K/UL — SIGNIFICANT CHANGE UP (ref 150–400)
PO2 BLDV: 73 MMHG — SIGNIFICANT CHANGE UP
POTASSIUM BLDV-SCNC: 6.4 MMOL/L — CRITICAL HIGH (ref 3.5–5.1)
POTASSIUM SERPL-MCNC: 6 MMOL/L — HIGH (ref 3.5–5.3)
POTASSIUM SERPL-SCNC: 6 MMOL/L — HIGH (ref 3.5–5.3)
PROT SERPL-MCNC: 6.2 G/DL — SIGNIFICANT CHANGE UP (ref 6–8.3)
PROT UR-MCNC: ABNORMAL
RBC # BLD: 4.1 M/UL — SIGNIFICANT CHANGE UP (ref 3.8–5.2)
RBC # FLD: 17.4 % — HIGH (ref 10.3–14.5)
RBC CASTS # UR COMP ASSIST: 2 /HPF — SIGNIFICANT CHANGE UP (ref 0–4)
SAO2 % BLDV: 93.2 % — SIGNIFICANT CHANGE UP
SODIUM SERPL-SCNC: 133 MMOL/L — LOW (ref 135–145)
SP GR SPEC: 1.02 — SIGNIFICANT CHANGE UP (ref 1–1.05)
UROBILINOGEN FLD QL: SIGNIFICANT CHANGE UP
WBC # BLD: 24.48 K/UL — HIGH (ref 3.8–10.5)
WBC # FLD AUTO: 24.48 K/UL — HIGH (ref 3.8–10.5)
WBC UR QL: 2 /HPF — SIGNIFICANT CHANGE UP (ref 0–5)

## 2021-12-20 PROCEDURE — 99291 CRITICAL CARE FIRST HOUR: CPT | Mod: GC

## 2021-12-20 PROCEDURE — 71045 X-RAY EXAM CHEST 1 VIEW: CPT | Mod: 26

## 2021-12-20 PROCEDURE — 99291 CRITICAL CARE FIRST HOUR: CPT

## 2021-12-20 RX ORDER — CHLORHEXIDINE GLUCONATE 213 G/1000ML
1 SOLUTION TOPICAL
Refills: 0 | Status: DISCONTINUED | OUTPATIENT
Start: 2021-12-20 | End: 2021-12-24

## 2021-12-20 RX ORDER — SODIUM CHLORIDE 9 MG/ML
1000 INJECTION, SOLUTION INTRAVENOUS ONCE
Refills: 0 | Status: COMPLETED | OUTPATIENT
Start: 2021-12-20 | End: 2021-12-20

## 2021-12-20 RX ORDER — SODIUM CHLORIDE 9 MG/ML
1000 INJECTION, SOLUTION INTRAVENOUS
Refills: 0 | Status: DISCONTINUED | OUTPATIENT
Start: 2021-12-20 | End: 2021-12-22

## 2021-12-20 RX ORDER — HALOPERIDOL DECANOATE 100 MG/ML
2 INJECTION INTRAMUSCULAR EVERY 6 HOURS
Refills: 0 | Status: DISCONTINUED | OUTPATIENT
Start: 2021-12-20 | End: 2021-12-29

## 2021-12-20 RX ORDER — DEXTROSE 50 % IN WATER 50 %
12.5 SYRINGE (ML) INTRAVENOUS ONCE
Refills: 0 | Status: DISCONTINUED | OUTPATIENT
Start: 2021-12-20 | End: 2021-12-22

## 2021-12-20 RX ORDER — GLUCAGON INJECTION, SOLUTION 0.5 MG/.1ML
1 INJECTION, SOLUTION SUBCUTANEOUS ONCE
Refills: 0 | Status: DISCONTINUED | OUTPATIENT
Start: 2021-12-20 | End: 2021-12-22

## 2021-12-20 RX ORDER — HEPARIN SODIUM 5000 [USP'U]/ML
5000 INJECTION INTRAVENOUS; SUBCUTANEOUS EVERY 8 HOURS
Refills: 0 | Status: DISCONTINUED | OUTPATIENT
Start: 2021-12-20 | End: 2021-12-21

## 2021-12-20 RX ORDER — INSULIN HUMAN 100 [IU]/ML
2 INJECTION, SOLUTION SUBCUTANEOUS
Qty: 100 | Refills: 0 | Status: DISCONTINUED | OUTPATIENT
Start: 2021-12-20 | End: 2021-12-21

## 2021-12-20 RX ORDER — SODIUM CHLORIDE 9 MG/ML
1000 INJECTION, SOLUTION INTRAVENOUS
Refills: 0 | Status: DISCONTINUED | OUTPATIENT
Start: 2021-12-20 | End: 2021-12-21

## 2021-12-20 RX ORDER — DEXTROSE 50 % IN WATER 50 %
25 SYRINGE (ML) INTRAVENOUS ONCE
Refills: 0 | Status: DISCONTINUED | OUTPATIENT
Start: 2021-12-20 | End: 2021-12-22

## 2021-12-20 RX ORDER — DEXTROSE 50 % IN WATER 50 %
15 SYRINGE (ML) INTRAVENOUS ONCE
Refills: 0 | Status: DISCONTINUED | OUTPATIENT
Start: 2021-12-20 | End: 2021-12-22

## 2021-12-20 RX ORDER — ONDANSETRON 8 MG/1
4 TABLET, FILM COATED ORAL ONCE
Refills: 0 | Status: COMPLETED | OUTPATIENT
Start: 2021-12-20 | End: 2021-12-20

## 2021-12-20 RX ORDER — SODIUM CHLORIDE 9 MG/ML
1000 INJECTION INTRAMUSCULAR; INTRAVENOUS; SUBCUTANEOUS ONCE
Refills: 0 | Status: COMPLETED | OUTPATIENT
Start: 2021-12-20 | End: 2021-12-20

## 2021-12-20 RX ADMIN — SODIUM CHLORIDE 1000 MILLILITER(S): 9 INJECTION INTRAMUSCULAR; INTRAVENOUS; SUBCUTANEOUS at 19:18

## 2021-12-20 RX ADMIN — HALOPERIDOL DECANOATE 2 MILLIGRAM(S): 100 INJECTION INTRAMUSCULAR at 21:26

## 2021-12-20 RX ADMIN — SODIUM CHLORIDE 1000 MILLILITER(S): 9 INJECTION, SOLUTION INTRAVENOUS at 20:00

## 2021-12-20 RX ADMIN — SODIUM CHLORIDE 1000 MILLILITER(S): 9 INJECTION, SOLUTION INTRAVENOUS at 21:28

## 2021-12-20 RX ADMIN — INSULIN HUMAN 9 UNIT(S)/HR: 100 INJECTION, SOLUTION SUBCUTANEOUS at 20:37

## 2021-12-20 RX ADMIN — ONDANSETRON 4 MILLIGRAM(S): 8 TABLET, FILM COATED ORAL at 19:18

## 2021-12-20 NOTE — H&P ADULT - NSHPSOCIALHISTORY_GEN_ALL_CORE
Per outpatient records never a smoker, no alcohol or drug use. Per son lives alone and independent in all ADLs at baseline. No difficulty walking, no recent travel, no sick contacts

## 2021-12-20 NOTE — H&P ADULT - ATTENDING COMMENTS
pt is a 72 yo female with hx  dm, htn, hld, obesity, parkinsonism, hypothyroidism  who presents with change in mental status.  Pt noted to have elevated glucose  1000, anion gap 37 and bicarb 8. Pt agitated in the er, son, who is a physician  at bedside, 2 day hx weakness and lethargy.      PE bp 130/74 rr 18 heent dry mucosa  neck supple, lungs clear abdomen obese nontender  ext no edema rle 4th toe amputation.    labs as above,    fs>600  bicarb 8  cr 2.4   k 4.9  phos 6.8      A/P pt with hyperosmolar hyperglycemia state with metabolic  acidosis. would start iv fluid, lactated ringers at 150 cc/hr  -start insulin drip at 0.1 mg/kg, check finger stick q 1 hrs  -monitor mg, phos, k q 4 hrs  -monitor urine output closely  -check echo to evaluate lv function  -dvt prophylaxis  -discuss goals of care with family  -keep npo for now  -aspiration precaution  -monitor pulse ox closely  -critically ill with HHS and metabolic acidosis

## 2021-12-20 NOTE — ED PROVIDER NOTE - PHYSICAL EXAMINATION
Physical Exam:  Gen: moaning w/ kussmaul breathing   Head: NCAT  HEENT: PEERL, normal conjunctiva, tongue midline, oral mucosa dry, lips cracked   Lung: CTAB, no wheezes/rhonchi/rales B/L  CV: Rapid rate, RR, no murmurs, rubs or gallops  Abd: soft, NT, ND, no guarding, no rigidity  MSK: no visible deformities, ROM normal in UE/LE  Neuro: Moving all four extremities spontaneously, not following verbal commands, opens eyes spontaneously and to voice   Skin: Warm, well perfused, no rash, no leg swelling  Sandra Novak D.O. -Resident

## 2021-12-20 NOTE — ED PROVIDER NOTE - CLINICAL SUMMARY MEDICAL DECISION MAKING FREE TEXT BOX
71y F w/ PMHx thyroid cancer s/p thyroidectomy, T1DM on insulin presenting to the ED with lethargy x1 day.  Patient tachycardic w/ Kussmaul breathing, moaning, not able to follow verbal commands, abd soft, NT, lungs CTAB, patient dry appearing with cracked lips, c/f DKA w/ underlying infection including possible recurrence of UTI. Will obtain labs, EKG, CXR, UA/UCx, start IVF, likely weill require insulin gtt and MICU consult.

## 2021-12-20 NOTE — H&P ADULT - HISTORY OF PRESENT ILLNESS
ED course   ED course: afebrile, , /65, RR 22, SpO2 98% 71F hx of T1DM (since age 38), Parkinsons (diagnosed 2017), bladder incontinence, ?HTN and HLD (son unsure) presents to the ED d/t SOB, nausea, vomiting and AMS that started Monday night. Patient is currently agitated and AOx0 so was unable to obtain information from her. Information was obtained from patient's son at bedside. Per son, patient was at her usual state of health until Monday morning, independent of all ADLs. Patient did not have fevers, chills, cough, chest pain, abdominal pain. Unclear exactly how many episodes of vomiting or the color of the vomitus. Pt did have a UTI 3 weeks ago and was started on abx - son unsure of which one. Pt was previously on a insulin pump but is now on basal-bolus. Per son, patient is not adherent with checking glucose checks and insulin dosing. Pt has never had DKA in the past. Of note patient has been taking Excedrin frequently for many years.     ED course: afebrile, , /65, RR 22, SpO2 98% RA. WBC 24.48, A1C 9.4, K 6.0, bicarb 8, AG 37. BUN 63, Cr 2.14 (baseline creatinine 0.8), glucose 1029, BHB >9.0. VBG pH 7.13, pCO2 27, lactate 6.0. Given haldol 2 for agitation, insulin drip at 9units/hour, and 3L fluids.  71F hx of T1DM (since age 38), Parkinsons (diagnosed 2017), bladder incontinence, ?HTN and HLD (son unsure) presents to the ED d/t SOB, nausea, vomiting and AMS that started Monday night. Patient is currently agitated and AOx0 so was unable to obtain information from her. Information was obtained from patient's son at bedside. Per son, patient was at her usual state of health until Monday morning, independent of all ADLs. Patient did not have fevers, chills, cough, chest pain, abdominal pain. Unclear exactly how many episodes of vomiting or the color of the vomitus. Pt did have a UTI 3 weeks ago and was started on abx - son unsure of which one. Pt was previously on a insulin pump but is now on basal-bolus. Per son, patient is not adherent with checking glucose checks and insulin dosing. Pt has never had DKA in the past. Of note patient has been taking Excedrin frequently for many years. Patient COVID vaccinated with Moderna (January initial, August booster). Cardiac cath two years ago within normal limits.     ED course: afebrile, , /65, RR 22, SpO2 98% RA. WBC 24.48, A1C 9.4, K 6.0, bicarb 8, AG 37. BUN 63, Cr 2.14 (baseline creatinine 0.8), glucose 1029, BHB >9.0. VBG pH 7.13, pCO2 27, lactate 6.0. Given haldol 2 for agitation, insulin drip at 9units/hour, and 3L fluids.  71F hx of T1DM (since age 38), Parkinsons (diagnosed 2017), bladder incontinence, anxiety/depression, HTN, HLD, thyroid cancer s/p resection in 2000 now with hypothyroidism presents to the ED d/t SOB, nausea, vomiting and AMS that started Monday night. Patient is currently agitated and AOx0 so was unable to obtain information from her. Information was obtained from patient's son at bedside. Per son, patient was at her usual state of health until Monday morning, independent of all ADLs. Patient did not have fevers, chills, cough, chest pain, abdominal pain. Unclear exactly how many episodes of vomiting or the color of the vomitus. Pt did have a UTI 3 weeks ago and was started on abx - son unsure of which one. Pt was previously on a insulin pump but is now on basal-bolus. Per son, patient is not adherent with checking glucose checks and insulin dosing. Pt has never had DKA in the past. Of note patient has been taking Excedrin frequently for many years. Patient COVID vaccinated with Moderna (January initial, August booster). Cardiac cath two years ago within normal limits.     ED course: afebrile, , /65, RR 22, SpO2 98% RA. WBC 24.48, A1C 9.4, K 6.0, bicarb 8, AG 37. BUN 63, Cr 2.14 (baseline creatinine 0.8), glucose 1029, BHB >9.0. VBG pH 7.13, pCO2 27, lactate 6.0. Given haldol 2 for agitation, insulin drip at 9units/hour, and 3L fluids. UA with ketones, CXR with clear lungs

## 2021-12-20 NOTE — ED ADULT NURSE REASSESSMENT NOTE - NS ED NURSE REASSESS COMMENT FT1
Received patient in bed lethargic and  restless not staying still unable to obtain BP, started insulin drip @ 9ml/hr son at bedside, will continue to monitor.

## 2021-12-20 NOTE — H&P ADULT - NSHPLABSRESULTS_GEN_ALL_CORE
CBC Full  -  ( 20 Dec 2021 19:29 )  WBC Count : 24.48 K/uL  RBC Count : 4.10 M/uL  Hemoglobin : 11.7 g/dL  Hematocrit : 38.6 %  Platelet Count - Automated : 288 K/uL  Mean Cell Volume : 94.1 fL  Mean Cell Hemoglobin : 28.5 pg  Mean Cell Hemoglobin Concentration : 30.3 gm/dL  Auto Neutrophil # : 22.00 K/uL  Auto Lymphocyte # : 0.91 K/uL  Auto Monocyte # : 1.32 K/uL  Auto Eosinophil # : 0.01 K/uL  Auto Basophil # : 0.03 K/uL  Auto Neutrophil % : 89.9 %  Auto Lymphocyte % : 3.7 %  Auto Monocyte % : 5.4 %  Auto Eosinophil % : 0.0 %  Auto Basophil % : 0.1 %      Comprehensive Metabolic Panel (12.20.21 @ 19:24)    Sodium, Serum: 133 mmol/L    Potassium, Serum: 6.0: SPECIMEN NOT HEMOLYZED mmol/L    Chloride, Serum: 88 mmol/L    Carbon Dioxide, Serum: 8: TYPE:(C=Critical, N=Notification, A=Abnormal) c  TESTS: _co2 glu bohb  DATE/TIME CALLED: _12/20/2021 20:04:31 EST  CALLED TO: _dr hakeem michel  READ BACK (2 Patient Identifiers)(Y/N): _y  READ BACK VALUES (Y/N): _y  CALLED BY: _rmorgan mmol/L    Anion Gap, Serum: 37 mmol/L    Blood Urea Nitrogen, Serum: 63 mg/dL    Creatinine, Serum: 2.14 mg/dL    Glucose, Serum: 1029 mg/dL    Calcium, Total Serum: 9.6 mg/dL    Protein Total, Serum: 6.2 g/dL    Albumin, Serum: 3.7 g/dL    Bilirubin Total, Serum: 0.7 mg/dL    Alkaline Phosphatase, Serum: 115 U/L    Aspartate Aminotransferase (AST/SGOT): 38 U/L    Alanine Aminotransferase (ALT/SGPT): 6 U/L

## 2021-12-20 NOTE — H&P ADULT - ASSESSMENT
71F hx of T1DM (since age 38), Parkinsons (diagnosed 2017), bladder incontinence, anxiety/depression, HTN, HLD, thyroid cancer s/p resection in 2000 now with hypothyroidism presents to the ED d/t SOB, nausea, vomiting and AMS that started Monday night. Patient is currently agitated and AOx0 so was unable to obtain information from her. Information was obtained from patient's son at bedside. Per son, patient was at her usual state of health until Monday morning, independent of all ADLs. Patient did not have fevers, chills, cough, chest pain, abdominal pain. Unclear exactly how many episodes of vomiting or the color of the vomitus. Pt did have a UTI 3 weeks ago and was started on abx - son unsure of which one. Pt was previously on a insulin pump but is now on basal-bolus. Per son, patient is not adherent with checking glucose checks and insulin dosing. Pt has never had DKA in the past. Of note patient has been taking Excedrin frequently for many years. Patient COVID vaccinated with Moderna (January initial, August booster). Cardiac cath two years ago within normal limits.     ED course: afebrile, , /65, RR 22, SpO2 98% RA. WBC 24.48, A1C 9.4, K 6.0, bicarb 8, AG 37. BUN 63, Cr 2.14 (baseline creatinine 0.8), glucose 1029, BHB >9.0. VBG pH 7.13, pCO2 27, lactate 6.0. Given haldol 2 for agitation, insulin drip at 9units/hour, and 3L fluids. UA with ketones    #AZALEA   - patient with BUN 63, Cr 2.14 on admission (baseline creatinine 0.8)   - likely pre-renal in the setting of DKA     #Anion gap metabolic acidosis   -     #SIRS   - patient with leukocytosis and tachycardia on admission   - UA negative for infection, CXR clear lungs   - follow up RVP, COVID, and blood cultures     #Hypothyroidism   - patient with thyroid cancer s/p resection in 2000 now with hypothyroidism   - follows with Dr. Ennis outpatient, currently on tirosint levothyroxine sodium) 125mcg daily   - will place patient on IV levothyroxine equivalent   - TSH 10/2021 1.00, will obtain TSH in AM     #HTN   - blood pressure currently well controlled   - call pharmacy in AM to confirm medications    #Agitation   - patient agitated on admission  - currently on haldol IV q6 for agitation     #Parkinsons  - patient diagnosed with Parkinson's in 2017, follows with Dr. Zelaya  - son unclear of sinemet dosing, pharmacy closed ([facility]- [phone])  - patient NPO for now, clarify dose in AM to start medication      #Diabetes with DKA  - Patient with hx of DM1, non-complaint with basal bolus and finger stick checking at home, A1C 9.4% on admission  - on admission glucose 1029, pH 7.13, bicarb 8, AG 37, BHB 63, K 6.0   - patient currently on insulin drip at 9units per hous  - q1hour finger sticks   - s/p 2L of fluids, now on LR at 150cc/hour  - BMP and VBG every four hours   - patient NPO for now  71F hx of T1DM (since age 38), Parkinsons (diagnosed 2017), bladder incontinence, anxiety/depression, HTN, HLD, thyroid cancer s/p resection in 2000 now with hypothyroidism presents to the ED d/t SOB, nausea, vomiting and AMS for one day. Patient found to be in DKA and admitted to the MICU for further management.       ==============NEURO=============  #Parkinsons  - patient diagnosed with Parkinson's in 2017, follows with Dr. Zelaya outpatient   - son unclear of sinemet dosing, pharmacy closed ([facility]- [phone])  - patient NPO for now, clarify dose in AM to start medication      #Agitation   - patient agitated on admission  - currently on haldol IV q6 for agitation     =========CARDIOVASCULAR=========  #HTN   - blood pressure currently well controlled   - call pharmacy in AM to confirm medications    ============RESPIRATORY==========  - Satting well on RA, no active issues at this time    ============RENAL==============  #AZALEA   - patient with BUN 63, Cr 2.14 on admission (baseline creatinine 0.8)   - likely pre-renal in the setting of DKA   - will treat DKA as described and continue to trend BUN and creatinine after fluids     #Anion gap metabolic acidosis   - on admission pH 7.13, bicarb 8, AG 37   - likely multifactorial in origin with DKA, uremia and lactate contributing   - will     ============GI===============  - Diet:    ============ENDO============  #Diabetes with DKA  - Patient with hx of DM1, non-complaint with basal bolus and finger stick checking at home, A1C 9.4% on admission  - on admission glucose 1029, pH 7.13, bicarb 8, AG 37, BHB 63, K 6.0   - patient currently on insulin drip at 9units per hous  - q1hour finger sticks   - s/p 2L of fluids, now on LR at 150cc/hour  - BMP and VBG every four hours   - patient NPO for now     #Hypothyroidism   - patient with thyroid cancer s/p resection in 2000 now with hypothyroidism   - follows with Dr. Ennis outpatient, currently on tirosint levothyroxine sodium) 125mcg daily   - will place patient on IV levothyroxine equivalent   - TSH 10/2021 1.00, will obtain TSH in AM     ===========HEME/ONC===========  - Trend H/H  - A/C:    =============ID=============  #SIRS   - patient with leukocytosis and tachycardia on admission   - UA negative for infection, CXR clear lungs   - follow up RVP, COVID, and blood cultures   - currently monitoring off antibiotics, higher suspicion for viral illness     ===========Lines/Tubes/Salas===========  - peripherals in tact  - GOC:               71F hx of T1DM (since age 38), Parkinsons (diagnosed 2017), bladder incontinence, anxiety/depression, HTN, HLD, thyroid cancer s/p resection in 2000 now with hypothyroidism presents to the ED d/t SOB, nausea, vomiting and AMS for one day. Patient found to be in DKA and admitted to the MICU for further management.       ==============NEURO=============  #Parkinsons  - patient diagnosed with Parkinson's in 2017, follows with Dr. Zelaya outpatient   - son unclear of sinemet dosing, pharmacy closed ([facility]- [phone])  - patient NPO for now, clarify dose in AM to start medication      #Acute metabolic encephalopathy   - patient agitated and confused on admission (normally A+Ox3, on admission A+Ox0)  - likely secondary to DKA   - infectious workup as below, will replete electrolytes as needed   - currently on haldol IV q6 for agitation     =========CARDIOVASCULAR=========  #HTN   - blood pressure currently well controlled   - call pharmacy in AM to confirm outpatient medications     ============RESPIRATORY==========]  #Shortness of breath   - Patient's son reports she was experiencing SOB in the day prior to admission   - CXR clear, RVP and covid pending   - blood cultures pending  - patient currently saturating well on room air     ============RENAL==============  #AZALEA   - patient with BUN 63, Cr 2.14 on admission (baseline creatinine 0.8)   - likely pre-renal in the setting of DKA   - will treat DKA and continue to trend BUN and creatinine    #Anion gap metabolic acidosis   - on admission pH 7.13, bicarb 8, AG 37   - likely multifactorial in origin with DKA, uremia and lactate contributing   - will continue to trend lactate and treat DKA and pre-renal AZALEA    ============GI===============  - Diet: NPO     ============ENDO============  #Diabetes with DKA  - Patient with hx of DM1, non-complaint with basal bolus at home, A1C 9.4% on admission  - on admission glucose 1029, pH 7.13, bicarb 8, AG 37, BHB 63, K 6.0   - patient currently on insulin drip at 9units per hour  - q1hour finger sticks   - s/p 2L of fluids, now on LR at 150cc/hour  - BMP and VBG every four hours   - patient NPO for now     #Hypothyroidism   - patient with thyroid cancer s/p resection in 2000 now with hypothyroidism   - follows with Dr. Ennis outpatient, currently on tirosint levothyroxine sodium) 125mcg daily   - will place patient on IV levothyroxine equivalent   - TSH 10/2021 1.00, will obtain TSH in AM     ===========HEME/ONC===========  - Trend H/H  - A/C: subcutaneous heparin     =============ID=============  #SIRS   - patient with leukocytosis and tachycardia on admission   - UA negative for infection, CXR clear lungs   - follow up RVP, COVID, and blood cultures   - currently monitoring off antibiotics, higher suspicion for viral illness     ===========Lines/Tubes/Salas===========  - peripherals in tact  - GOC: full code                71F hx of T1DM (since age 38), Parkinsons (diagnosed 2017), bladder incontinence, anxiety/depression, HTN, HLD, thyroid cancer s/p resection in 2000 now with hypothyroidism presents to the ED d/t SOB, nausea, vomiting and AMS for one day. Patient found to be in DKA and admitted to the MICU for further management.     ==============NEURO=============  #Acute metabolic encephalopathy   - patient agitated and confused on admission (normally A+Ox3, on admission A+Ox0)  - likely secondary to DKA   - infectious workup as below, will replete electrolytes as needed   - currently on haldol IV q6 for agitation     #Parkinsons  - patient diagnosed with Parkinson's in 2017, follows with Dr. Zelaya outpatient   - son unclear of sinemet dosing, pharmacy closed ([facility]- [phone])  - patient NPO for now, clarify dose in AM to start medication      =========CARDIOVASCULAR=========  #HTN   - blood pressure currently well controlled   - call pharmacy in AM to confirm outpatient medications     ============RESPIRATORY==========  #Shortness of breath   - Patient's son reports she was experiencing SOB in the day prior to admission   - CXR clear, RVP and covid pending   - blood cultures pending  - patient currently saturating well on room air     ============RENAL==============  #AZALEA   - patient with BUN 63, Cr 2.14 on admission (baseline creatinine 0.8)   - likely pre-renal in the setting of DKA   - will treat DKA and continue to trend BUN and creatinine    #Anion gap metabolic acidosis   - on admission pH 7.13, bicarb 8, AG 37   - likely multifactorial in origin with DKA, uremia and lactate contributing   - will continue to trend lactate and treat DKA and pre-renal AZALEA    ============GI===============  #Nausea and vomiting   - patient with n/v on admission, likely secondary to DKA   - will treat nausea as needed and continue to monitor     - Diet: NPO while on insulin drip     ============ENDO============  #Diabetes with DKA  - Patient with hx of DM1, non-complaint with basal bolus at home, A1C 9.4% on admission  - on admission glucose 1029, pH 7.13, bicarb 8, AG 37, BHB 9, K 6.0   - patient currently on insulin drip at 9units per hour, will adjust per DKA protocol   - s/p 2L of fluids, now on LR at 150cc/hour  - BMP q4, VBG q4 and finger sticks q1   - patient NPO while on insulin drip     #Hypothyroidism   - patient with thyroid cancer s/p resection in 2000 now with hypothyroidism   - follows with Dr. Ennis outpatient, currently on tirosint levothyroxine sodium) 125mcg daily   - will place patient on IV levothyroxine equivalent   - TSH 10/2021 1.00, will obtain TSH in AM     ===========HEME/ONC===========  - Trend H/H  - A/C: subcutaneous heparin     =============ID=============  #SIRS   - patient with leukocytosis and tachycardia on admission   - UA negative for infection, CXR clear lungs   - follow up RVP, COVID, and blood cultures   - currently monitoring off antibiotics, higher suspicion for viral illness     ===========Lines/Tubes/Salas===========  - peripherals in tact  - GOC: full code

## 2021-12-20 NOTE — ED ADULT NURSE NOTE - OBJECTIVE STATEMENT
Patient is a 70 yo h/x DM I, HTN, Parkinson's. Patient was found to have altered mental status today by , who brought her to MD and then to ED. Patient lethargic but answering some questions, restless with labored breathing. Applied 3L NC, sinus tach on cardiac monitor. 20G PIV placed in LFA, labs and COVID swab sent per orders. Fall precautions maintained. Son at bedside.

## 2021-12-20 NOTE — ED PROVIDER NOTE - OBJECTIVE STATEMENT
71y F w/ PMHx 71y F w/ PMHx thyroid cancer s/p thyroidectomy, T1DM on insulin presenting to the ED with lethargy x1 day. Son (podiatrist w/ Keeley at bedside providing collateral), states today patient was complaining of weakness, and progressively worsened, becoming lethargic and altered. Patient arrived to ED room, moaning, unable to take part in exam, not following commands. Son denies previous known hx of DKA, or recent fever, chills, cough, chest pain, sob, vomiting, diarrhea. Does endorse patient has hx of chronic UTIs and was recently treated with antibiotics, but is unsure if she is still taking medication. Son cannot confirm if patient is compliant with her medications and states he is unsure what meds she takes at home.

## 2021-12-20 NOTE — ED ADULT NURSE NOTE - NSIMPLEMENTINTERV_GEN_ALL_ED
Implemented All Fall with Harm Risk Interventions:  [place] to call system. Call bell, personal items and telephone within reach. Instruct patient to call for assistance. Room bathroom lighting operational. Non-slip footwear when patient is off stretcher. Physically safe environment: no spills, clutter or unnecessary equipment. Stretcher in lowest position, wheels locked, appropriate side rails in place. Provide visual cue, wrist band, yellow gown, etc. Monitor gait and stability. Monitor for mental status changes and reorient to person, place, and time. Review medications for side effects contributing to fall risk. Reinforce activity limits and safety measures with patient and family. Provide visual clues: red socks.

## 2021-12-20 NOTE — ED PROVIDER NOTE - ATTENDING CONTRIBUTION TO CARE
Dr. Roth:  I have personally performed a face to face bedside history and physical examination of this patient. I have discussed the history, examination, review of systems, assessment and plan of management with the resident. I have reviewed the electronic medical record and amended it to reflect my history, review of systems, physical exam, assessment and plan. Dr. Roth:  I have personally performed a face to face bedside history and physical examination of this patient. I have discussed the history, examination, review of systems, assessment and plan of management with the resident. I have reviewed the electronic medical record and amended it to reflect my history, review of systems, physical exam, assessment and plan.    71F h/o IDDM presents c/o SOB and N/V x 1 day.  FS >600 in triage.  Has been non-compliant with her insulin at home because recently has had a few hypoglycemic episodes.    Exam:  - [place] breathing  - tachy  - ctab   -abd soft ntnd    A/P  - suspect DKA, eval infection  - cbc, cmp, vbg, beta-hydroxybutyrate, UA, cxr, ekg

## 2021-12-20 NOTE — H&P ADULT - NSHPPHYSICALEXAM_GEN_ALL_CORE
Vital Signs Last 24 Hrs  T(C): 36.7 (20 Dec 2021 19:16), Max: 37.2 (20 Dec 2021 17:51)  T(F): 98.1 (20 Dec 2021 19:16), Max: 99 (20 Dec 2021 17:51)  HR: 122 (20 Dec 2021 20:44) (109 - 122)  BP: 122/33 (20 Dec 2021 20:44) (122/33 - 132/65)  BP(mean): --  RR: 22 (20 Dec 2021 20:44) (22 - 24)  SpO2: 96% (20 Dec 2021 20:44) (96% - 98%)    CONSTITUTIONAL: NAD, well-developed, well-groomed  EYES: PERRLA; conjunctiva and sclera clear  ENMT: Moist oral mucosa, no pharyngeal injection or exudates; normal dentition  NECK: Supple, no palpable masses; no thyromegaly  RESPIRATORY: Normal respiratory effort; lungs are clear to auscultation bilaterally  CARDIOVASCULAR: Regular rate and rhythm, normal S1 and S2, no murmur/rub/gallop; No lower extremity edema; Peripheral pulses are 2+ bilaterally  ABDOMEN: Nontender to palpation, normoactive bowel sounds, no rebound/guarding; No hepatosplenomegaly  MUSCULOSKELETAL:  Normal gait; no clubbing or cyanosis of digits; no joint swelling or tenderness to palpation  PSYCH: A+O to person, place, and time; affect appropriate  NEUROLOGY: CN 2-12 are intact and symmetric; no gross sensory deficits   SKIN: No rashes; no palpable lesions Vital Signs Last 24 Hrs  T(C): 36.7 (20 Dec 2021 19:16), Max: 37.2 (20 Dec 2021 17:51)  T(F): 98.1 (20 Dec 2021 19:16), Max: 99 (20 Dec 2021 17:51)  HR: 122 (20 Dec 2021 20:44) (109 - 122)  BP: 122/33 (20 Dec 2021 20:44) (122/33 - 132/65)  BP(mean): --  RR: 22 (20 Dec 2021 20:44) (22 - 24)  SpO2: 96% (20 Dec 2021 20:44) (96% - 98%)    CONSTITUTIONAL: Patient agitated   ENMT: Dry mucous membranes  NECK: Supple  RESPIRATORY: Normal respiratory effort; lungs are clear to auscultation bilaterally  CARDIOVASCULAR: Regular rate and rhythm, normal S1 and S2, no murmur/rub/gallop; No lower extremity edema; Peripheral pulses are 2+ bilaterally  ABDOMEN: Nontender to palpation, normoactive bowel sounds, no rebound/guarding; No hepatosplenomegaly  MUSCULOSKELETAL:  Normal gait; no clubbing or cyanosis of digits; no joint swelling or tenderness to palpation  PSYCH: A+O to person, place, and time; affect appropriate  NEUROLOGY: CN 2-12 are intact and symmetric; no gross sensory deficits   SKIN: No rashes; no palpable lesions Vital Signs Last 24 Hrs  T(C): 36.7 (20 Dec 2021 19:16), Max: 37.2 (20 Dec 2021 17:51)  T(F): 98.1 (20 Dec 2021 19:16), Max: 99 (20 Dec 2021 17:51)  HR: 122 (20 Dec 2021 20:44) (109 - 122)  BP: 122/33 (20 Dec 2021 20:44) (122/33 - 132/65)  BP(mean): --  RR: 22 (20 Dec 2021 20:44) (22 - 24)  SpO2: 96% (20 Dec 2021 20:44) (96% - 98%)    CONSTITUTIONAL: Patient agitated   ENMT: Dry mucous membranes  NECK: Supple  RESPIRATORY: Normal respiratory effort; lungs are clear to auscultation bilaterally  CARDIOVASCULAR: Regular rate and rhythm, normal S1 and S2, no murmur/rub/gallop; No lower extremity edema; Peripheral pulses are 2+ bilaterally  ABDOMEN: Nontender to palpation, normoactive bowel sounds, no rebound/guarding; No hepatosplenomegaly  MUSCULOSKELETAL:  No joint swelling   PSYCH: patient agitated and A+Ox0  NEUROLOGY:  Moving all four extremities spontaneously, not following verbal commands, opens eyes spontaneously and to voice   SKIN: No rashes; no palpable lesions

## 2021-12-20 NOTE — H&P ADULT - NSHPREVIEWOFSYSTEMS_GEN_ALL_CORE
CONSTITUTIONAL: No fevers or chills  EYES/ENT: No visual changes;  No vertigo or throat pain   NECK: No pain or stiffness  RESPIRATORY: +SOB  CARDIOVASCULAR: No chest pain or palpitations  GASTROINTESTINAL: No abdominal pain. +nausea and vomiting   GENITOURINARY: No dysuria, frequency or hematuria  NEUROLOGICAL: No numbness or weakness  MUSCULOSKELETAL: No myalgias, arthralgias, or joint swelling  SKIN: No itching, rashes

## 2021-12-20 NOTE — ED ADULT TRIAGE NOTE - CHIEF COMPLAINT QUOTE
p/t c/o of diffuse abd pain with nausea and vomiting since this am, p/t sent by PMD for high blood sugar, fs "HI": p/t appears lethargic

## 2021-12-21 ENCOUNTER — TRANSCRIPTION ENCOUNTER (OUTPATIENT)
Age: 71
End: 2021-12-21

## 2021-12-21 DIAGNOSIS — E10.9 TYPE 1 DIABETES MELLITUS WITHOUT COMPLICATIONS: ICD-10-CM

## 2021-12-21 DIAGNOSIS — E11.10 TYPE 2 DIABETES MELLITUS WITH KETOACIDOSIS WITHOUT COMA: ICD-10-CM

## 2021-12-21 LAB
ANION GAP SERPL CALC-SCNC: 15 MMOL/L — HIGH (ref 7–14)
ANION GAP SERPL CALC-SCNC: 15 MMOL/L — HIGH (ref 7–14)
ANION GAP SERPL CALC-SCNC: 18 MMOL/L — HIGH (ref 7–14)
ANION GAP SERPL CALC-SCNC: 19 MMOL/L — HIGH (ref 7–14)
ANION GAP SERPL CALC-SCNC: 22 MMOL/L — HIGH (ref 7–14)
ANION GAP SERPL CALC-SCNC: 34 MMOL/L — HIGH (ref 7–14)
B-OH-BUTYR SERPL-SCNC: 1.5 MMOL/L — HIGH (ref 0–0.4)
BASE EXCESS BLDV CALC-SCNC: -18.1 MMOL/L — LOW (ref -2–3)
BASOPHILS # BLD AUTO: 0.02 K/UL — SIGNIFICANT CHANGE UP (ref 0–0.2)
BASOPHILS NFR BLD AUTO: 0.1 % — SIGNIFICANT CHANGE UP (ref 0–2)
BLOOD GAS VENOUS COMPREHENSIVE RESULT: SIGNIFICANT CHANGE UP
BUN SERPL-MCNC: 53 MG/DL — HIGH (ref 7–23)
BUN SERPL-MCNC: 59 MG/DL — HIGH (ref 7–23)
BUN SERPL-MCNC: 63 MG/DL — HIGH (ref 7–23)
BUN SERPL-MCNC: 64 MG/DL — HIGH (ref 7–23)
BUN SERPL-MCNC: 66 MG/DL — HIGH (ref 7–23)
BUN SERPL-MCNC: 67 MG/DL — HIGH (ref 7–23)
CALCIUM SERPL-MCNC: 8.7 MG/DL — SIGNIFICANT CHANGE UP (ref 8.4–10.5)
CALCIUM SERPL-MCNC: 8.9 MG/DL — SIGNIFICANT CHANGE UP (ref 8.4–10.5)
CALCIUM SERPL-MCNC: 9 MG/DL — SIGNIFICANT CHANGE UP (ref 8.4–10.5)
CALCIUM SERPL-MCNC: 9.1 MG/DL — SIGNIFICANT CHANGE UP (ref 8.4–10.5)
CALCIUM SERPL-MCNC: 9.4 MG/DL — SIGNIFICANT CHANGE UP (ref 8.4–10.5)
CALCIUM SERPL-MCNC: 9.5 MG/DL — SIGNIFICANT CHANGE UP (ref 8.4–10.5)
CHLORIDE BLDV-SCNC: 100 MMOL/L — SIGNIFICANT CHANGE UP (ref 96–108)
CHLORIDE SERPL-SCNC: 102 MMOL/L — SIGNIFICANT CHANGE UP (ref 98–107)
CHLORIDE SERPL-SCNC: 103 MMOL/L — SIGNIFICANT CHANGE UP (ref 98–107)
CHLORIDE SERPL-SCNC: 104 MMOL/L — SIGNIFICANT CHANGE UP (ref 98–107)
CHLORIDE SERPL-SCNC: 107 MMOL/L — SIGNIFICANT CHANGE UP (ref 98–107)
CHLORIDE SERPL-SCNC: 110 MMOL/L — HIGH (ref 98–107)
CHLORIDE SERPL-SCNC: 97 MMOL/L — LOW (ref 98–107)
CO2 BLDV-SCNC: 9.3 MMOL/L — LOW (ref 22–26)
CO2 SERPL-SCNC: 17 MMOL/L — LOW (ref 22–31)
CO2 SERPL-SCNC: 19 MMOL/L — LOW (ref 22–31)
CO2 SERPL-SCNC: 20 MMOL/L — LOW (ref 22–31)
CO2 SERPL-SCNC: 21 MMOL/L — LOW (ref 22–31)
CO2 SERPL-SCNC: 21 MMOL/L — LOW (ref 22–31)
CO2 SERPL-SCNC: 7 MMOL/L — CRITICAL LOW (ref 22–31)
CREAT SERPL-MCNC: 1.76 MG/DL — HIGH (ref 0.5–1.3)
CREAT SERPL-MCNC: 2.12 MG/DL — HIGH (ref 0.5–1.3)
CREAT SERPL-MCNC: 2.3 MG/DL — HIGH (ref 0.5–1.3)
CREAT SERPL-MCNC: 2.43 MG/DL — HIGH (ref 0.5–1.3)
CREAT SERPL-MCNC: 2.61 MG/DL — HIGH (ref 0.5–1.3)
CREAT SERPL-MCNC: 2.64 MG/DL — HIGH (ref 0.5–1.3)
EOSINOPHIL # BLD AUTO: 0 K/UL — SIGNIFICANT CHANGE UP (ref 0–0.5)
EOSINOPHIL NFR BLD AUTO: 0 % — SIGNIFICANT CHANGE UP (ref 0–6)
GAS PNL BLDV: 133 MMOL/L — LOW (ref 136–145)
GLUCOSE BLDC GLUCOMTR-MCNC: 122 MG/DL — HIGH (ref 70–99)
GLUCOSE BLDC GLUCOMTR-MCNC: 133 MG/DL — HIGH (ref 70–99)
GLUCOSE BLDC GLUCOMTR-MCNC: 136 MG/DL — HIGH (ref 70–99)
GLUCOSE BLDC GLUCOMTR-MCNC: 139 MG/DL — HIGH (ref 70–99)
GLUCOSE BLDC GLUCOMTR-MCNC: 148 MG/DL — HIGH (ref 70–99)
GLUCOSE BLDC GLUCOMTR-MCNC: 155 MG/DL — HIGH (ref 70–99)
GLUCOSE BLDC GLUCOMTR-MCNC: 156 MG/DL — HIGH (ref 70–99)
GLUCOSE BLDC GLUCOMTR-MCNC: 162 MG/DL — HIGH (ref 70–99)
GLUCOSE BLDC GLUCOMTR-MCNC: 171 MG/DL — HIGH (ref 70–99)
GLUCOSE BLDC GLUCOMTR-MCNC: 184 MG/DL — HIGH (ref 70–99)
GLUCOSE BLDC GLUCOMTR-MCNC: 190 MG/DL — HIGH (ref 70–99)
GLUCOSE BLDC GLUCOMTR-MCNC: 198 MG/DL — HIGH (ref 70–99)
GLUCOSE BLDC GLUCOMTR-MCNC: 210 MG/DL — HIGH (ref 70–99)
GLUCOSE BLDC GLUCOMTR-MCNC: 227 MG/DL — HIGH (ref 70–99)
GLUCOSE BLDC GLUCOMTR-MCNC: 227 MG/DL — HIGH (ref 70–99)
GLUCOSE BLDC GLUCOMTR-MCNC: 237 MG/DL — HIGH (ref 70–99)
GLUCOSE BLDC GLUCOMTR-MCNC: 239 MG/DL — HIGH (ref 70–99)
GLUCOSE BLDC GLUCOMTR-MCNC: 244 MG/DL — HIGH (ref 70–99)
GLUCOSE BLDC GLUCOMTR-MCNC: 269 MG/DL — HIGH (ref 70–99)
GLUCOSE BLDC GLUCOMTR-MCNC: 297 MG/DL — HIGH (ref 70–99)
GLUCOSE BLDC GLUCOMTR-MCNC: 354 MG/DL — HIGH (ref 70–99)
GLUCOSE BLDC GLUCOMTR-MCNC: 404 MG/DL — HIGH (ref 70–99)
GLUCOSE BLDC GLUCOMTR-MCNC: 476 MG/DL — CRITICAL HIGH (ref 70–99)
GLUCOSE BLDC GLUCOMTR-MCNC: 549 MG/DL — CRITICAL HIGH (ref 70–99)
GLUCOSE BLDV-MCNC: >685 MG/DL — CRITICAL HIGH (ref 70–99)
GLUCOSE SERPL-MCNC: 137 MG/DL — HIGH (ref 70–99)
GLUCOSE SERPL-MCNC: 195 MG/DL — HIGH (ref 70–99)
GLUCOSE SERPL-MCNC: 200 MG/DL — HIGH (ref 70–99)
GLUCOSE SERPL-MCNC: 304 MG/DL — HIGH (ref 70–99)
GLUCOSE SERPL-MCNC: 322 MG/DL — HIGH (ref 70–99)
GLUCOSE SERPL-MCNC: 722 MG/DL — CRITICAL HIGH (ref 70–99)
HCO3 BLDV-SCNC: 9 MMOL/L — CRITICAL LOW (ref 22–29)
HCT VFR BLD CALC: 35.3 % — SIGNIFICANT CHANGE UP (ref 34.5–45)
HCT VFR BLDA CALC: 33 % — LOW (ref 34.5–46.5)
HGB BLD CALC-MCNC: 11 G/DL — LOW (ref 11.5–15.5)
HGB BLD-MCNC: 11.2 G/DL — LOW (ref 11.5–15.5)
IANC: 24.09 K/UL — HIGH (ref 1.5–8.5)
IMM GRANULOCYTES NFR BLD AUTO: 1.1 % — SIGNIFICANT CHANGE UP (ref 0–1.5)
LACTATE BLDV-MCNC: 6.6 MMOL/L — CRITICAL HIGH (ref 0.5–2)
LYMPHOCYTES # BLD AUTO: 1.14 K/UL — SIGNIFICANT CHANGE UP (ref 1–3.3)
LYMPHOCYTES # BLD AUTO: 4.2 % — LOW (ref 13–44)
MAGNESIUM SERPL-MCNC: 2.3 MG/DL — SIGNIFICANT CHANGE UP (ref 1.6–2.6)
MAGNESIUM SERPL-MCNC: 2.4 MG/DL — SIGNIFICANT CHANGE UP (ref 1.6–2.6)
MAGNESIUM SERPL-MCNC: 2.5 MG/DL — SIGNIFICANT CHANGE UP (ref 1.6–2.6)
MCHC RBC-ENTMCNC: 28.5 PG — SIGNIFICANT CHANGE UP (ref 27–34)
MCHC RBC-ENTMCNC: 31.7 GM/DL — LOW (ref 32–36)
MCV RBC AUTO: 89.8 FL — SIGNIFICANT CHANGE UP (ref 80–100)
MONOCYTES # BLD AUTO: 1.87 K/UL — HIGH (ref 0–0.9)
MONOCYTES NFR BLD AUTO: 6.8 % — SIGNIFICANT CHANGE UP (ref 2–14)
NEUTROPHILS # BLD AUTO: 24.09 K/UL — HIGH (ref 1.8–7.4)
NEUTROPHILS NFR BLD AUTO: 87.8 % — HIGH (ref 43–77)
NRBC # BLD: 0 /100 WBCS — SIGNIFICANT CHANGE UP
NRBC # FLD: 0 K/UL — SIGNIFICANT CHANGE UP
PCO2 BLDV: 23 MMHG — LOW (ref 39–42)
PH BLDV: 7.18 — LOW (ref 7.32–7.43)
PHOSPHATE SERPL-MCNC: 2.7 MG/DL — SIGNIFICANT CHANGE UP (ref 2.5–4.5)
PHOSPHATE SERPL-MCNC: 3 MG/DL — SIGNIFICANT CHANGE UP (ref 2.5–4.5)
PHOSPHATE SERPL-MCNC: 3.7 MG/DL — SIGNIFICANT CHANGE UP (ref 2.5–4.5)
PHOSPHATE SERPL-MCNC: 3.9 MG/DL — SIGNIFICANT CHANGE UP (ref 2.5–4.5)
PHOSPHATE SERPL-MCNC: 4 MG/DL — SIGNIFICANT CHANGE UP (ref 2.5–4.5)
PHOSPHATE SERPL-MCNC: 6.8 MG/DL — HIGH (ref 2.5–4.5)
PLATELET # BLD AUTO: 265 K/UL — SIGNIFICANT CHANGE UP (ref 150–400)
PO2 BLDV: 91 MMHG — SIGNIFICANT CHANGE UP
POTASSIUM BLDV-SCNC: 5.1 MMOL/L — SIGNIFICANT CHANGE UP (ref 3.5–5.1)
POTASSIUM SERPL-MCNC: 4.5 MMOL/L — SIGNIFICANT CHANGE UP (ref 3.5–5.3)
POTASSIUM SERPL-MCNC: 4.5 MMOL/L — SIGNIFICANT CHANGE UP (ref 3.5–5.3)
POTASSIUM SERPL-MCNC: 4.6 MMOL/L — SIGNIFICANT CHANGE UP (ref 3.5–5.3)
POTASSIUM SERPL-MCNC: 4.7 MMOL/L — SIGNIFICANT CHANGE UP (ref 3.5–5.3)
POTASSIUM SERPL-MCNC: 4.9 MMOL/L — SIGNIFICANT CHANGE UP (ref 3.5–5.3)
POTASSIUM SERPL-MCNC: 4.9 MMOL/L — SIGNIFICANT CHANGE UP (ref 3.5–5.3)
POTASSIUM SERPL-SCNC: 4.5 MMOL/L — SIGNIFICANT CHANGE UP (ref 3.5–5.3)
POTASSIUM SERPL-SCNC: 4.5 MMOL/L — SIGNIFICANT CHANGE UP (ref 3.5–5.3)
POTASSIUM SERPL-SCNC: 4.6 MMOL/L — SIGNIFICANT CHANGE UP (ref 3.5–5.3)
POTASSIUM SERPL-SCNC: 4.7 MMOL/L — SIGNIFICANT CHANGE UP (ref 3.5–5.3)
POTASSIUM SERPL-SCNC: 4.9 MMOL/L — SIGNIFICANT CHANGE UP (ref 3.5–5.3)
POTASSIUM SERPL-SCNC: 4.9 MMOL/L — SIGNIFICANT CHANGE UP (ref 3.5–5.3)
RBC # BLD: 3.93 M/UL — SIGNIFICANT CHANGE UP (ref 3.8–5.2)
RBC # FLD: 16.6 % — HIGH (ref 10.3–14.5)
SAO2 % BLDV: 98 % — SIGNIFICANT CHANGE UP
SARS-COV-2 RNA SPEC QL NAA+PROBE: SIGNIFICANT CHANGE UP
SODIUM SERPL-SCNC: 138 MMOL/L — SIGNIFICANT CHANGE UP (ref 135–145)
SODIUM SERPL-SCNC: 141 MMOL/L — SIGNIFICANT CHANGE UP (ref 135–145)
SODIUM SERPL-SCNC: 141 MMOL/L — SIGNIFICANT CHANGE UP (ref 135–145)
SODIUM SERPL-SCNC: 143 MMOL/L — SIGNIFICANT CHANGE UP (ref 135–145)
SODIUM SERPL-SCNC: 143 MMOL/L — SIGNIFICANT CHANGE UP (ref 135–145)
SODIUM SERPL-SCNC: 145 MMOL/L — SIGNIFICANT CHANGE UP (ref 135–145)
TSH SERPL-MCNC: 0.49 UIU/ML — SIGNIFICANT CHANGE UP (ref 0.27–4.2)
WBC # BLD: 27.42 K/UL — HIGH (ref 3.8–10.5)
WBC # FLD AUTO: 27.42 K/UL — HIGH (ref 3.8–10.5)

## 2021-12-21 PROCEDURE — 93308 TTE F-UP OR LMTD: CPT | Mod: 26

## 2021-12-21 PROCEDURE — 99223 1ST HOSP IP/OBS HIGH 75: CPT

## 2021-12-21 PROCEDURE — 99291 CRITICAL CARE FIRST HOUR: CPT | Mod: 25

## 2021-12-21 PROCEDURE — 76604 US EXAM CHEST: CPT | Mod: 26

## 2021-12-21 RX ORDER — CARBIDOPA AND LEVODOPA 25; 100 MG/1; MG/1
0 TABLET ORAL
Qty: 0 | Refills: 0 | DISCHARGE

## 2021-12-21 RX ORDER — LOSARTAN POTASSIUM 100 MG/1
1 TABLET, FILM COATED ORAL
Qty: 0 | Refills: 0 | DISCHARGE

## 2021-12-21 RX ORDER — ATORVASTATIN CALCIUM 80 MG/1
1 TABLET, FILM COATED ORAL
Qty: 0 | Refills: 0 | DISCHARGE

## 2021-12-21 RX ORDER — LOSARTAN POTASSIUM 100 MG/1
100 TABLET, FILM COATED ORAL DAILY
Refills: 0 | Status: DISCONTINUED | OUTPATIENT
Start: 2021-12-21 | End: 2021-12-31

## 2021-12-21 RX ORDER — LEVOTHYROXINE SODIUM 125 MCG
93.75 TABLET ORAL
Refills: 0 | Status: DISCONTINUED | OUTPATIENT
Start: 2021-12-21 | End: 2021-12-21

## 2021-12-21 RX ORDER — IRBESARTAN 75 MG/1
1 TABLET ORAL
Qty: 0 | Refills: 0 | DISCHARGE

## 2021-12-21 RX ORDER — CARBIDOPA AND LEVODOPA 25; 100 MG/1; MG/1
1 TABLET ORAL THREE TIMES A DAY
Refills: 0 | Status: DISCONTINUED | OUTPATIENT
Start: 2021-12-21 | End: 2021-12-31

## 2021-12-21 RX ORDER — CLONAZEPAM 1 MG
1 TABLET ORAL
Qty: 0 | Refills: 0 | DISCHARGE

## 2021-12-21 RX ORDER — PANTOPRAZOLE SODIUM 20 MG/1
1 TABLET, DELAYED RELEASE ORAL
Qty: 0 | Refills: 0 | DISCHARGE

## 2021-12-21 RX ORDER — ESCITALOPRAM OXALATE 10 MG/1
1 TABLET, FILM COATED ORAL
Qty: 0 | Refills: 0 | DISCHARGE

## 2021-12-21 RX ORDER — LEVOTHYROXINE SODIUM 125 MCG
100 TABLET ORAL ONCE
Refills: 0 | Status: DISCONTINUED | OUTPATIENT
Start: 2021-12-21 | End: 2021-12-21

## 2021-12-21 RX ORDER — LEVOTHYROXINE SODIUM 125 MCG
100 TABLET ORAL AT BEDTIME
Refills: 0 | Status: DISCONTINUED | OUTPATIENT
Start: 2021-12-21 | End: 2021-12-25

## 2021-12-21 RX ORDER — INSULIN HUMAN 100 [IU]/ML
1.4 INJECTION, SOLUTION SUBCUTANEOUS
Qty: 100 | Refills: 0 | Status: DISCONTINUED | OUTPATIENT
Start: 2021-12-21 | End: 2021-12-22

## 2021-12-21 RX ORDER — DULOXETINE HYDROCHLORIDE 30 MG/1
1 CAPSULE, DELAYED RELEASE ORAL
Qty: 0 | Refills: 0 | DISCHARGE

## 2021-12-21 RX ORDER — LEVOTHYROXINE SODIUM 125 MCG
125 TABLET ORAL AT BEDTIME
Refills: 0 | Status: DISCONTINUED | OUTPATIENT
Start: 2021-12-21 | End: 2021-12-21

## 2021-12-21 RX ORDER — LORATADINE 10 MG/1
10 TABLET ORAL DAILY
Refills: 0 | Status: DISCONTINUED | OUTPATIENT
Start: 2021-12-21 | End: 2021-12-31

## 2021-12-21 RX ORDER — RASAGILINE 0.5 MG/1
1 TABLET ORAL DAILY
Refills: 0 | Status: DISCONTINUED | OUTPATIENT
Start: 2021-12-21 | End: 2021-12-31

## 2021-12-21 RX ORDER — CHOLECALCIFEROL (VITAMIN D3) 125 MCG
1 CAPSULE ORAL
Qty: 0 | Refills: 0 | DISCHARGE

## 2021-12-21 RX ORDER — HEPARIN SODIUM 5000 [USP'U]/ML
7500 INJECTION INTRAVENOUS; SUBCUTANEOUS EVERY 8 HOURS
Refills: 0 | Status: DISCONTINUED | OUTPATIENT
Start: 2021-12-21 | End: 2021-12-28

## 2021-12-21 RX ORDER — RASAGILINE 0.5 MG/1
1 TABLET ORAL
Qty: 0 | Refills: 0 | DISCHARGE

## 2021-12-21 RX ORDER — ESCITALOPRAM OXALATE 10 MG/1
10 TABLET, FILM COATED ORAL DAILY
Refills: 0 | Status: DISCONTINUED | OUTPATIENT
Start: 2021-12-21 | End: 2021-12-31

## 2021-12-21 RX ORDER — INSULIN GLARGINE 100 [IU]/ML
0 INJECTION, SOLUTION SUBCUTANEOUS
Qty: 0 | Refills: 0 | DISCHARGE

## 2021-12-21 RX ORDER — DEXTROSE MONOHYDRATE, SODIUM CHLORIDE, AND POTASSIUM CHLORIDE 50; .745; 4.5 G/1000ML; G/1000ML; G/1000ML
1000 INJECTION, SOLUTION INTRAVENOUS
Refills: 0 | Status: DISCONTINUED | OUTPATIENT
Start: 2021-12-21 | End: 2021-12-22

## 2021-12-21 RX ORDER — LEVOTHYROXINE SODIUM 125 MCG
1 TABLET ORAL
Qty: 0 | Refills: 0 | DISCHARGE

## 2021-12-21 RX ORDER — LEVOTHYROXINE SODIUM 125 MCG
100 TABLET ORAL AT BEDTIME
Refills: 0 | Status: DISCONTINUED | OUTPATIENT
Start: 2021-12-21 | End: 2021-12-21

## 2021-12-21 RX ORDER — CARBIDOPA AND LEVODOPA 25; 100 MG/1; MG/1
1 TABLET ORAL
Qty: 0 | Refills: 0 | DISCHARGE

## 2021-12-21 RX ADMIN — SODIUM CHLORIDE 150 MILLILITER(S): 9 INJECTION, SOLUTION INTRAVENOUS at 00:07

## 2021-12-21 RX ADMIN — INSULIN HUMAN 3 UNIT(S)/HR: 100 INJECTION, SOLUTION SUBCUTANEOUS at 06:32

## 2021-12-21 RX ADMIN — Medication 0.5 MILLIGRAM(S): at 21:24

## 2021-12-21 RX ADMIN — CARBIDOPA AND LEVODOPA 1 TABLET(S): 25; 100 TABLET ORAL at 22:33

## 2021-12-21 RX ADMIN — DEXTROSE MONOHYDRATE, SODIUM CHLORIDE, AND POTASSIUM CHLORIDE 125 MILLILITER(S): 50; .745; 4.5 INJECTION, SOLUTION INTRAVENOUS at 15:21

## 2021-12-21 RX ADMIN — HEPARIN SODIUM 5000 UNIT(S): 5000 INJECTION INTRAVENOUS; SUBCUTANEOUS at 00:07

## 2021-12-21 RX ADMIN — HEPARIN SODIUM 7500 UNIT(S): 5000 INJECTION INTRAVENOUS; SUBCUTANEOUS at 21:25

## 2021-12-21 RX ADMIN — DEXTROSE MONOHYDRATE, SODIUM CHLORIDE, AND POTASSIUM CHLORIDE 150 MILLILITER(S): 50; .745; 4.5 INJECTION, SOLUTION INTRAVENOUS at 05:22

## 2021-12-21 RX ADMIN — HEPARIN SODIUM 5000 UNIT(S): 5000 INJECTION INTRAVENOUS; SUBCUTANEOUS at 05:32

## 2021-12-21 RX ADMIN — HEPARIN SODIUM 7500 UNIT(S): 5000 INJECTION INTRAVENOUS; SUBCUTANEOUS at 15:21

## 2021-12-21 RX ADMIN — DEXTROSE MONOHYDRATE, SODIUM CHLORIDE, AND POTASSIUM CHLORIDE 50 MILLILITER(S): 50; .745; 4.5 INJECTION, SOLUTION INTRAVENOUS at 23:42

## 2021-12-21 RX ADMIN — CHLORHEXIDINE GLUCONATE 1 APPLICATION(S): 213 SOLUTION TOPICAL at 06:43

## 2021-12-21 RX ADMIN — Medication 100 MICROGRAM(S): at 21:27

## 2021-12-21 RX ADMIN — Medication 93.75 MICROGRAM(S): at 15:20

## 2021-12-21 RX ADMIN — DEXTROSE MONOHYDRATE, SODIUM CHLORIDE, AND POTASSIUM CHLORIDE 125 MILLILITER(S): 50; .745; 4.5 INJECTION, SOLUTION INTRAVENOUS at 20:29

## 2021-12-21 NOTE — PATIENT PROFILE ADULT - FALL HARM RISK - HARM RISK INTERVENTIONS

## 2021-12-21 NOTE — CHART NOTE - NSCHARTNOTEFT_GEN_A_CORE
: Harish STEIN    INDICATION: DKA R/O SWMA    PROCEDURE:  [x] LIMITED ECHO  [x] LIMITED CHEST  [ ] LIMITED RETROPERITONEAL  [ ] LIMITED ABDOMINAL  [ ] LIMITED DVT  [ ] NEEDLE GUIDANCE VASCULAR  [ ] NEEDLE GUIDANCE THORACENTESIS  [ ] NEEDLE GUIDANCE PARACENTESIS  [ ] NEEDLE GUIDANCE PERICARDIOCENTESIS  [ ] OTHER    FINDINGS:     Lung: Bilateral A line pattern    Cardiac: Normal GDE    INTERPRETATION:    Normal aeration pattern No cardiac limitation

## 2021-12-21 NOTE — PROGRESS NOTE ADULT - SUBJECTIVE AND OBJECTIVE BOX
INTERVAL HPI/OVERNIGHT EVENTS:    SUBJECTIVE: No acute events since admission.    CONSTITUTIONAL: No weakness, fevers or chills  EYES/ENT: No visual changes;  No vertigo or throat pain   NECK: No pain or stiffness  RESPIRATORY: No cough, wheezing, hemoptysis; No shortness of breath  CARDIOVASCULAR: No chest pain or palpitations  GASTROINTESTINAL: No abdominal or epigastric pain. No nausea, vomiting, or hematemesis; No diarrhea or constipation. No melena or hematochezia.  GENITOURINARY: No dysuria, frequency or hematuria  NEUROLOGICAL: No numbness or weakness  SKIN: No itching, rashes    OBJECTIVE:    VITAL SIGNS:  ICU Vital Signs Last 24 Hrs  T(C): 37.1 (21 Dec 2021 12:00), Max: 37.6 (21 Dec 2021 02:23)  T(F): 98.7 (21 Dec 2021 12:00), Max: 99.6 (21 Dec 2021 02:23)  HR: 93 (21 Dec 2021 12:00) (93 - 123)  BP: 153/49 (21 Dec 2021 12:00) (111/39 - 160/54)  BP(mean): 74 (21 Dec 2021 12:00) (55 - 75)  ABP: --  ABP(mean): --  RR: 18 (21 Dec 2021 12:00) (16 - 24)  SpO2: 97% (21 Dec 2021 12:00) (94% - 99%)         @ :  -   @ 07:00  --------------------------------------------------------  IN: 789 mL / OUT: 130 mL / NET: 659 mL     @ 07:  -   @ 12:59  --------------------------------------------------------  IN: 888.5 mL / OUT: 310 mL / NET: 578.5 mL      CAPILLARY BLOOD GLUCOSE      POCT Blood Glucose.: 244 mg/dL (21 Dec 2021 12:07)        PHYSICAL EXAM:  GENERAL: NAD, well-developed  HEAD:  Atraumatic, Normocephalic  EYES: EOMI, PERRLA, conjunctiva and sclera clear  NECK: Supple, No JVD  CHEST/LUNG: Clear to auscultation bilaterally; No wheeze  HEART: Regular rate and rhythm; No murmurs, rubs, or gallops  ABDOMEN: Soft, Nontender, Nondistended; Bowel sounds present  EXTREMITIES:  2+ Peripheral Pulses, No clubbing, cyanosis, or edema  PSYCH: AAOx3  NEUROLOGY: non-focal  SKIN: No rashes or lesions  Lines:      MEDICATIONS:  MEDICATIONS  (STANDING):  chlorhexidine 4% Liquid 1 Application(s) Topical <User Schedule>  dextrose 40% Gel 15 Gram(s) Oral once  dextrose 5% + sodium chloride 0.45% with potassium chloride 20 mEq/L 1000 milliLiter(s) (175 mL/Hr) IV Continuous <Continuous>  dextrose 5%. 1000 milliLiter(s) (50 mL/Hr) IV Continuous <Continuous>  dextrose 5%. 1000 milliLiter(s) (100 mL/Hr) IV Continuous <Continuous>  dextrose 50% Injectable 25 Gram(s) IV Push once  dextrose 50% Injectable 12.5 Gram(s) IV Push once  dextrose 50% Injectable 25 Gram(s) IV Push once  glucagon  Injectable 1 milliGRAM(s) IntraMuscular once  heparin   Injectable 7500 Unit(s) SubCutaneous every 8 hours  insulin regular Infusion 2 Unit(s)/Hr (2 mL/Hr) IV Continuous <Continuous>  levothyroxine Injectable 93.75 MICROGram(s) IV Push <User Schedule>    MEDICATIONS  (PRN):  haloperidol    Injectable 2 milliGRAM(s) IV Push every 6 hours PRN Agitation      ALLERGIES:  Allergies    Ceclor (Unknown)  iodine containing compounds (Unknown)  shellfish (Unknown)    Intolerances        LABS:                        11.2   27.42 )-----------( 265      ( 21 Dec 2021 04:43 )             35.3     -    143  |  104  |  67<H>  ----------------------------<  195<H>  4.5   |  21<L>  |  2.61<H>    Ca    9.5      21 Dec 2021 09:04  Phos  4.0       Mg     2.40     12-    TPro  6.2  /  Alb  3.7  /  TBili  0.7  /  DBili  x   /  AST  38<H>  /  ALT  6   /  AlkPhos  115  12-      Urinalysis Basic - ( 20 Dec 2021 21:13 )    Color: Light Yellow / Appearance: Clear / S.022 / pH: x  Gluc: x / Ketone: Small  / Bili: Negative / Urobili: <2 mg/dL   Blood: x / Protein: Trace / Nitrite: Negative   Leuk Esterase: Negative / RBC: 2 /HPF / WBC 2 /HPF   Sq Epi: x / Non Sq Epi: 4 /HPF / Bacteria: Negative        RADIOLOGY & ADDITIONAL TESTS: Reviewed.         INTERVAL HPI/OVERNIGHT EVENTS:    SUBJECTIVE: No acute events since admission.    CONSTITUTIONAL: No weakness, fevers or chills  EYES/ENT: No visual changes;  No vertigo or throat pain   NECK: No pain or stiffness  RESPIRATORY: No cough, wheezing, hemoptysis; No shortness of breath  CARDIOVASCULAR: No chest pain or palpitations  GASTROINTESTINAL: No abdominal or epigastric pain. No nausea, vomiting, or hematemesis; No diarrhea or constipation. No melena or hematochezia.  GENITOURINARY: No dysuria, frequency or hematuria  NEUROLOGICAL: No numbness or weakness  SKIN: No itching, rashes    OBJECTIVE:    VITAL SIGNS:  ICU Vital Signs Last 24 Hrs  T(C): 37.1 (21 Dec 2021 12:00), Max: 37.6 (21 Dec 2021 02:23)  T(F): 98.7 (21 Dec 2021 12:00), Max: 99.6 (21 Dec 2021 02:23)  HR: 93 (21 Dec 2021 12:00) (93 - 123)  BP: 153/49 (21 Dec 2021 12:00) (111/39 - 160/54)  BP(mean): 74 (21 Dec 2021 12:00) (55 - 75)  ABP: --  ABP(mean): --  RR: 18 (21 Dec 2021 12:00) (16 - 24)  SpO2: 97% (21 Dec 2021 12:00) (94% - 99%)         @ :  -   @ 07:00  --------------------------------------------------------  IN: 789 mL / OUT: 130 mL / NET: 659 mL     @ 07:  -   @ 12:59  --------------------------------------------------------  IN: 888.5 mL / OUT: 310 mL / NET: 578.5 mL      CAPILLARY BLOOD GLUCOSE      POCT Blood Glucose.: 244 mg/dL (21 Dec 2021 12:07)        PHYSICAL EXAM:  GENERAL: NAD, well-developed  HEAD:  Atraumatic, Normocephalic  EYES:  PERRLA, conjunctiva and sclera clear  NECK: Supple, No JVD  CHEST/LUNG: Clear to auscultation bilaterally; No wheeze  HEART: Regular rate and rhythm; No murmurs, rubs, or gallops  ABDOMEN: Soft, Nontender, Nondistended; Bowel sounds present  EXTREMITIES:  2+ Peripheral Pulses, No clubbing, cyanosis, or edema  PSYCH: AAOx0  NEUROLOGY: non-focal  SKIN: No rashes or lesions      MEDICATIONS:  MEDICATIONS  (STANDING):  chlorhexidine 4% Liquid 1 Application(s) Topical <User Schedule>  dextrose 40% Gel 15 Gram(s) Oral once  dextrose 5% + sodium chloride 0.45% with potassium chloride 20 mEq/L 1000 milliLiter(s) (175 mL/Hr) IV Continuous <Continuous>  dextrose 5%. 1000 milliLiter(s) (50 mL/Hr) IV Continuous <Continuous>  dextrose 5%. 1000 milliLiter(s) (100 mL/Hr) IV Continuous <Continuous>  dextrose 50% Injectable 25 Gram(s) IV Push once  dextrose 50% Injectable 12.5 Gram(s) IV Push once  dextrose 50% Injectable 25 Gram(s) IV Push once  glucagon  Injectable 1 milliGRAM(s) IntraMuscular once  heparin   Injectable 7500 Unit(s) SubCutaneous every 8 hours  insulin regular Infusion 2 Unit(s)/Hr (2 mL/Hr) IV Continuous <Continuous>  levothyroxine Injectable 93.75 MICROGram(s) IV Push <User Schedule>    MEDICATIONS  (PRN):  haloperidol    Injectable 2 milliGRAM(s) IV Push every 6 hours PRN Agitation      ALLERGIES:  Allergies    Ceclor (Unknown)  iodine containing compounds (Unknown)  shellfish (Unknown)    Intolerances        LABS:                        11.2   27.42 )-----------( 265      ( 21 Dec 2021 04:43 )             35.3     12-    143  |  104  |  67<H>  ----------------------------<  195<H>  4.5   |  21<L>  |  2.61<H>    Ca    9.5      21 Dec 2021 09:04  Phos  4.0     12-  Mg     2.40     12-21    TPro  6.2  /  Alb  3.7  /  TBili  0.7  /  DBili  x   /  AST  38<H>  /  ALT  6   /  AlkPhos  115  12-20      Urinalysis Basic - ( 20 Dec 2021 21:13 )    Color: Light Yellow / Appearance: Clear / S.022 / pH: x  Gluc: x / Ketone: Small  / Bili: Negative / Urobili: <2 mg/dL   Blood: x / Protein: Trace / Nitrite: Negative   Leuk Esterase: Negative / RBC: 2 /HPF / WBC 2 /HPF   Sq Epi: x / Non Sq Epi: 4 /HPF / Bacteria: Negative        RADIOLOGY & ADDITIONAL TESTS: Reviewed.

## 2021-12-21 NOTE — CONSULT NOTE ADULT - ASSESSMENT
#AZALEA   - patient with BUN 63, Cr 2.14 on admission (baseline creatinine 0.8)   - likely pre-renal in the setting of DKA   - will treat DKA and continue to trend BUN and creatinine    #Anion gap metabolic acidosis   - on admission pH 7.13, bicarb 8, AG 37   - likely multifactorial in origin with DKA, uremia and lactate contributing   - will continue to trend lactate and treat DKA and pre-renal AZALEA    ============GI===============  #Nausea and vomiting   - patient with n/v on admission, likely secondary to DKA   - will treat nausea as needed and continue to monitor     - Diet: NPO while on insulin drip     ============ENDO============  #Diabetes with DKA  - Patient with hx of DM1, non-complaint with basal bolus at home, A1C 9.4% on admission  - on admission glucose 1029, pH 7.13, bicarb 8, AG 37, BHB 9, K 6.0   - patient currently on insulin drip at 9units per hour, will adjust per DKA protocol   - s/p 2L of fluids, now on LR at 150cc/hour  - BMP q4, VBG q4 and finger sticks q1   - patient NPO while on insulin drip     #Hypothyroidism   - patient with thyroid cancer s/p resection in 2000 now with hypothyroidism   - follows with Dr. Ennis outpatient, currently on tirosint levothyroxine sodium) 125mcg daily   - will place patient on IV levothyroxine equivalent   - TSH 10/2021 1.00, will obtain TSH in AM    71 F hx of T1DM (since age 38), thyroid cancer s/p resection in 2000 now with hypothyroidism admitted for DKA.     # DKA - on admission glucose 1029, pH 7.13, bicarb 8, AG 37, BHB 9, K 6.0   # Type 1 DM   # Non-compliance   - A1c 9.4%, a1c goal <7%   - c/w insulin drip as per ICU protocol   - home medications unknown, primary team to help with med reconciliation   - would do weight based dose of Lantus 18 U when transitioning off insulin drip   - once off the drip and tolerating diet: Lispro 5 U TID with meals and moderate dose SS with meals and low dose SS at night.  carb consistent diet   - check NICKIE and zinc transporter ab with next blood draw     #Hypothyroidism   - patient with thyroid cancer s/p resection in 2000 now with hypothyroidism   - follows with Dr. Ennis outpatient, currently on levothyroxine 125mcg daily   - IV dose should be 80% of PO dose, so dose 100 mcg IV daily   - TSH 10/2021 1.00, TSH in AM     will try to obtain more collaterals from the family tomorrow     Jamila William MD  Attending Physician   Department of Endocrinology, Diabetes and Metabolism   Cell: [phone]    If before 9AM or after 5PM, or on weekends/holidays, please call the Endocrine answering service for assistance ([phone]).  For nonurgent matters, please email [email or web address] for assistance.

## 2021-12-21 NOTE — PROGRESS NOTE ADULT - ATTENDING COMMENTS
Patient examined and case reviewed in detail on bedside rounds  Critically ill and unstable with lethargy and severe DKA  Frequent bedside visits with therapy change today.   I have personally provided 35+ minutes of critical care time concurrently with the resident/fellow; this excludes time spent on separate procedures.      This patient had a goal directed echo within 24 hours of admission to the ICU  The physician staff has had a goals of care discussion with this patient and/or their family  This patient is on DVT prophylaxis

## 2021-12-21 NOTE — CONSULT NOTE ADULT - SUBJECTIVE AND OBJECTIVE BOX
Reason For Consult:     HPI:  71F hx of T1DM (since age 38), Parkinsons (diagnosed 2017), bladder incontinence, anxiety/depression, HTN, HLD, thyroid cancer s/p resection in 2000 now with hypothyroidism presents to the ED d/t SOB, nausea, vomiting and AMS that started Monday night. Patient is currently agitated and AOx0 so was unable to obtain information from her. Information was obtained from patient's son at bedside. Per son, patient was at her usual state of health until Monday morning, independent of all ADLs. Patient did not have fevers, chills, cough, chest pain, abdominal pain. Unclear exactly how many episodes of vomiting or the color of the vomitus. Pt did have a UTI 3 weeks ago and was started on abx - son unsure of which one. Pt was previously on a insulin pump but is now on basal-bolus. Per son, patient is not adherent with checking glucose checks and insulin dosing. Pt has never had DKA in the past. Of note patient has been taking Excedrin frequently for many years. Patient COVID vaccinated with Moderna (January initial, August booster). Cardiac cath two years ago within normal limits.     ED course: afebrile, , /65, RR 22, SpO2 98% RA. WBC 24.48, A1C 9.4, K 6.0, bicarb 8, AG 37. BUN 63, Cr 2.14 (baseline creatinine 0.8), glucose 1029, BHB >9.0. VBG pH 7.13, pCO2 27, lactate 6.0. Given haldol 2 for agitation, insulin drip at 9units/hour, and 3L fluids. UA with ketones, CXR with clear lungs  (20 Dec 2021 21:04)      PAST MEDICAL & SURGICAL HISTORY:  Thyroid cancer    H/O thyroidectomy    Melanoma  left leg    H/O basal cell carcinoma excision    Aftercare following right shoulder joint replacement surgery        FAMILY HISTORY:      Social History:    Outpatient Medications:    MEDICATIONS  (STANDING):  chlorhexidine 4% Liquid 1 Application(s) Topical <User Schedule>  dextrose 40% Gel 15 Gram(s) Oral once  dextrose 5% + sodium chloride 0.45% with potassium chloride 20 mEq/L 1000 milliLiter(s) (125 mL/Hr) IV Continuous <Continuous>  dextrose 5%. 1000 milliLiter(s) (50 mL/Hr) IV Continuous <Continuous>  dextrose 5%. 1000 milliLiter(s) (100 mL/Hr) IV Continuous <Continuous>  dextrose 50% Injectable 25 Gram(s) IV Push once  dextrose 50% Injectable 12.5 Gram(s) IV Push once  dextrose 50% Injectable 25 Gram(s) IV Push once  glucagon  Injectable 1 milliGRAM(s) IntraMuscular once  heparin   Injectable 7500 Unit(s) SubCutaneous every 8 hours  insulin regular Infusion 2 Unit(s)/Hr (2 mL/Hr) IV Continuous <Continuous>  levothyroxine Injectable 93.75 MICROGram(s) IV Push <User Schedule>    MEDICATIONS  (PRN):  haloperidol    Injectable 2 milliGRAM(s) IV Push every 6 hours PRN Agitation      Allergies    Ceclor (Unknown)  iodine containing compounds (Unknown)  shellfish (Unknown)    Intolerances      Review of Systems:  Constitutional: No fever  Eyes: No blurry vision  Neuro: No tremors  HEENT: No pain  Cardiovascular: No chest pain, palpitations  Respiratory: No SOB, no cough  GI: No nausea, vomiting, abdominal pain  : No dysuria  Skin: no rash  Psych: no depression  Endocrine: no polyuria, polydipsia  Hem/lymph: no swelling  Osteoporosis: no fractures    ALL OTHER SYSTEMS REVIEWED AND NEGATIVE    UNABLE TO OBTAIN    PHYSICAL EXAM:  VITALS: T(C): 37.1 (12-21-21 @ 12:00)  T(F): 98.7 (12-21-21 @ 12:00), Max: 99.6 (12-21-21 @ 02:23)  HR: 91 (12-21-21 @ 13:00) (91 - 123)  BP: 157/52 (12-21-21 @ 13:00) (111/39 - 160/54)  RR:  (16 - 24)  SpO2:  (94% - 99%)  Wt(kg): --  GENERAL: NAD, well-groomed, well-developed  EYES: No proptosis, no lid lag, anicteric  HEENT:  Atraumatic, Normocephalic, moist mucous membranes  THYROID: Normal size, no palpable nodules  RESPIRATORY: Clear to auscultation bilaterally; No rales, rhonchi, wheezing, or rubs  CARDIOVASCULAR: Regular rate and rhythm; No murmurs; no peripheral edema  GI: Soft, nontender, non distended, normal bowel sounds  SKIN: Dry, intact, No rashes or lesions  MUSCULOSKELETAL: Full range of motion, normal strength  NEURO: sensation intact, extraocular movements intact, no tremor, normal reflexes  PSYCH: Alert and oriented x 3, normal affect, normal mood  CUSHING'S SIGNS: no striae    POCT Blood Glucose.: 297 mg/dL (12-21-21 @ 13:18)  POCT Blood Glucose.: 244 mg/dL (12-21-21 @ 12:07)  POCT Blood Glucose.: 237 mg/dL (12-21-21 @ 11:06)  POCT Blood Glucose.: 210 mg/dL (12-21-21 @ 10:13)  POCT Blood Glucose.: 198 mg/dL (12-21-21 @ 09:00)  POCT Blood Glucose.: 156 mg/dL (12-21-21 @ 08:02)  POCT Blood Glucose.: 133 mg/dL (12-21-21 @ 07:01)  POCT Blood Glucose.: 155 mg/dL (12-21-21 @ 06:04)  POCT Blood Glucose.: 184 mg/dL (12-21-21 @ 05:06)  POCT Blood Glucose.: 269 mg/dL (12-21-21 @ 04:02)  POCT Blood Glucose.: 354 mg/dL (12-21-21 @ 03:01)  POCT Blood Glucose.: 404 mg/dL (12-21-21 @ 02:15)  POCT Blood Glucose.: 476 mg/dL (12-21-21 @ 01:02)  POCT Blood Glucose.: 549 mg/dL (12-21-21 @ 00:11)  POCT Blood Glucose.: >600 mg/dL (12-20-21 @ 23:04)  POCT Blood Glucose.: >600 mg/dL (12-20-21 @ 22:05)  POCT Blood Glucose.: >600 mg/dL (12-20-21 @ 21:09)  POCT Blood Glucose.: >600 mg/dL (12-20-21 @ 19:29)  POCT Blood Glucose.: >600 mg/dL (12-20-21 @ 17:50)                            11.2   27.42 )-----------( 265      ( 21 Dec 2021 04:43 )             35.3       12-21    143  |  104  |  67<H>  ----------------------------<  195<H>  4.5   |  21<L>  |  2.61<H>    EGFR if : 20<L>  EGFR if non : 18<L>    Ca    9.5      12-21  Mg     2.40     12-21  Phos  4.0     12-21    TPro  6.2  /  Alb  3.7  /  TBili  0.7  /  DBili  x   /  AST  38<H>  /  ALT  6   /  AlkPhos  115  12-20      Thyroid Function Tests:  12-20 @ 23:52 TSH 0.49 FreeT4 -- T3 -- Anti TPO -- Anti Thyroglobulin Ab -- TSI --     Reason For Consult: DKA     HPI:  71 F hx of T1DM (since age 38), Parkinsons (diagnosed 2017), bladder incontinence, anxiety/depression, HTN, HLD, thyroid cancer s/p resection in 2000 now with hypothyroidism presents to the ED d/t SOB, nausea, vomiting and AMS that started Monday night and dx with DKA.  Patient is still lethargic, so unable to obtain information from her.  Called Son who is a podiatrist but was busy with his patients.  Spoke very briefly, but will touch base again tomorrow.  Per son, patient was at her usual state of health until Monday morning, independent of all ADLs.  Patient did not have fevers, chills, cough, chest pain, abdominal pain. Unclear exactly how many episodes of vomiting or the color of the vomitus. Pt did have a UTI 3 weeks ago and was started on abx - son unsure of which one. Pt was previously on a insulin pump but is now on basal-bolus.  Per son, patient is not adherent with checking glucose checks and insulin dosing. Pt has never had DKA in the past.      ED course:  A1C 9.4%, K 6.0, bicarb 8, AG 37. BUN 63, Cr 2.14 (baseline creatinine 0.8), glucose 1029, BHB >9.0. VBG pH 7.13, pCO2 27, lactate 6.0. Patient remains on insulin drip at 4u nits/hour.     Diabetes history:   - DX around the age 38 with type 1 DM   - Was on pump but then stopped after  passed away.  Then, she was started on pens, but unsure if she is compliant.   - Endocrinologist: Dr. Sophia Roland // [phone]    PAST MEDICAL & SURGICAL HISTORY:  Thyroid cancer    H/O thyroidectomy    Melanoma  left leg    H/O basal cell carcinoma excision    Aftercare following right shoulder joint replacement surgery    FAMILY HISTORY: unable to obtain     Social History: lives alone     Outpatient Medications:  MEDICATIONS  (STANDING):  chlorhexidine 4% Liquid 1 Application(s) Topical <User Schedule>  dextrose 40% Gel 15 Gram(s) Oral once  dextrose 5% + sodium chloride 0.45% with potassium chloride 20 mEq/L 1000 milliLiter(s) (125 mL/Hr) IV Continuous <Continuous>  dextrose 5%. 1000 milliLiter(s) (50 mL/Hr) IV Continuous <Continuous>  dextrose 5%. 1000 milliLiter(s) (100 mL/Hr) IV Continuous <Continuous>  dextrose 50% Injectable 25 Gram(s) IV Push once  dextrose 50% Injectable 12.5 Gram(s) IV Push once  dextrose 50% Injectable 25 Gram(s) IV Push once  glucagon  Injectable 1 milliGRAM(s) IntraMuscular once  heparin   Injectable 7500 Unit(s) SubCutaneous every 8 hours  insulin regular Infusion 2 Unit(s)/Hr (2 mL/Hr) IV Continuous <Continuous>  levothyroxine Injectable 93.75 MICROGram(s) IV Push <User Schedule>    MEDICATIONS  (PRN):  haloperidol    Injectable 2 milliGRAM(s) IV Push every 6 hours PRN Agitation    Allergies  Ceclor (Unknown)  iodine containing compounds (Unknown)  shellfish (Unknown)    Review of Systems:  UNABLE TO OBTAIN    PHYSICAL EXAM:  VITALS: T(C): 37.1 (12-21-21 @ 12:00)  T(F): 98.7 (12-21-21 @ 12:00), Max: 99.6 (12-21-21 @ 02:23)  HR: 91 (12-21-21 @ 13:00) (91 - 123)  BP: 157/52 (12-21-21 @ 13:00) (111/39 - 160/54)  RR:  (16 - 24)  SpO2:  (94% - 99%)  Wt(kg): --  GENERAL: NAD, well-groomed, well-developed  EYES: No proptosis, no lid lag, anicteric  HEENT:  Atraumatic, Normocephalic, moist mucous membranes  THYROID: Normal size, no palpable nodules  RESPIRATORY: Clear to auscultation bilaterally; No rales, rhonchi, wheezing, or rubs  CARDIOVASCULAR: Regular rate and rhythm; No murmurs; no peripheral edema  GI: Soft, nontender, non distended, normal bowel sounds  SKIN: Dry, intact, No rashes or lesions  PSYCH: lethargic   CUSHING'S SIGNS: no striae    POCT Blood Glucose.: 297 mg/dL (12-21-21 @ 13:18)  POCT Blood Glucose.: 244 mg/dL (12-21-21 @ 12:07)  POCT Blood Glucose.: 237 mg/dL (12-21-21 @ 11:06)  POCT Blood Glucose.: 210 mg/dL (12-21-21 @ 10:13)  POCT Blood Glucose.: 198 mg/dL (12-21-21 @ 09:00)  POCT Blood Glucose.: 156 mg/dL (12-21-21 @ 08:02)  POCT Blood Glucose.: 133 mg/dL (12-21-21 @ 07:01)  POCT Blood Glucose.: 155 mg/dL (12-21-21 @ 06:04)  POCT Blood Glucose.: 184 mg/dL (12-21-21 @ 05:06)  POCT Blood Glucose.: 269 mg/dL (12-21-21 @ 04:02)  POCT Blood Glucose.: 354 mg/dL (12-21-21 @ 03:01)  POCT Blood Glucose.: 404 mg/dL (12-21-21 @ 02:15)  POCT Blood Glucose.: 476 mg/dL (12-21-21 @ 01:02)  POCT Blood Glucose.: 549 mg/dL (12-21-21 @ 00:11)  POCT Blood Glucose.: >600 mg/dL (12-20-21 @ 23:04)  POCT Blood Glucose.: >600 mg/dL (12-20-21 @ 22:05)  POCT Blood Glucose.: >600 mg/dL (12-20-21 @ 21:09)  POCT Blood Glucose.: >600 mg/dL (12-20-21 @ 19:29)  POCT Blood Glucose.: >600 mg/dL (12-20-21 @ 17:50)               11.2   27.42 )-----------( 265      ( 21 Dec 2021 04:43 )             35.3     12-21    143  |  104  |  67<H>  ----------------------------<  195<H>  4.5   |  21<L>  |  2.61<H>    EGFR if : 20<L>  EGFR if non : 18<L>    Ca    9.5      12-21  Mg     2.40     12-21  Phos  4.0     12-21    TPro  6.2  /  Alb  3.7  /  TBili  0.7  /  DBili  x   /  AST  38<H>  /  ALT  6   /  AlkPhos  115  12-20      Thyroid Function Tests:  12-20 @ 23:52 TSH 0.49 FreeT4 -- T3 -- Anti TPO -- Anti Thyroglobulin Ab -- TSI --

## 2021-12-21 NOTE — PROGRESS NOTE ADULT - ASSESSMENT
71F hx of T1DM (since age 38), Parkinsons (diagnosed 2017), bladder incontinence, anxiety/depression, HTN, HLD, thyroid cancer s/p resection in 2000 now with hypothyroidism presents to the ED d/t SOB, nausea, vomiting and AMS for one day. Patient found to be in DKA and admitted to the MICU for further management.     ==============NEURO=============  #Acute metabolic encephalopathy   - patient agitated and confused on admission (normally A+Ox3, on admission A+Ox0)  - likely secondary to DKA   - infectious workup as below, will replete electrolytes as needed   - currently on haldol IV q6 for agitation     #Parkinsons  - patient diagnosed with Parkinson's in 2017, follows with Dr. Zelaya outpatient   - son unclear of sinemet dosing, pharmacy closed ([facility]- [phone])  - patient NPO for now, clarify dose in AM to start medication      =========CARDIOVASCULAR=========  #HTN   - blood pressure currently well controlled   - call pharmacy in AM to confirm outpatient medications     ============RESPIRATORY==========  #Shortness of breath   - Patient's son reports she was experiencing SOB in the day prior to admission   - CXR clear, RVP and covid pending   - blood cultures pending  - patient currently saturating well on room air     ============RENAL==============  #AZALEA   - patient with BUN 63, Cr 2.14 on admission (baseline creatinine 0.8)   - likely pre-renal in the setting of DKA   - will treat DKA and continue to trend BUN and creatinine    #Anion gap metabolic acidosis   - on admission pH 7.13, bicarb 8, AG 37   - likely multifactorial in origin with DKA, uremia and lactate contributing   - will continue to trend lactate and treat DKA and pre-renal AZALEA    ============GI===============  #Nausea and vomiting   - patient with n/v on admission, likely secondary to DKA   - will treat nausea as needed and continue to monitor     - Diet: NPO while on insulin drip     ============ENDO============  #Diabetes with DKA  - Patient with hx of DM1, non-complaint with basal bolus at home, A1C 9.4% on admission  - on admission glucose 1029, pH 7.13, bicarb 8, AG 37, BHB 9, K 6.0   - patient currently on insulin drip at 9units per hour, will adjust per DKA protocol   - s/p 2L of fluids, now on LR at 150cc/hour  - BMP q4, VBG q4 and finger sticks q1   - patient NPO while on insulin drip     #Hypothyroidism   - patient with thyroid cancer s/p resection in 2000 now with hypothyroidism   - follows with Dr. Ennis outpatient, currently on tirosint levothyroxine sodium) 125mcg daily   - will place patient on IV levothyroxine equivalent   - TSH 10/2021 1.00, will obtain TSH in AM     ===========HEME/ONC===========  - Trend H/H  - A/C: subcutaneous heparin     =============ID=============  #SIRS   - patient with leukocytosis and tachycardia on admission   - UA negative for infection, CXR clear lungs   - follow up RVP, COVID, and blood cultures   - currently monitoring off antibiotics, higher suspicion for viral illness     ===========Lines/Tubes/Salas===========  - peripherals in tact  - GOC: full code                71F hx of T1DM (since age 38), Parkinsons (diagnosed 2017), bladder incontinence, anxiety/depression, HTN, HLD, thyroid cancer s/p resection in 2000 now with hypothyroidism presents to the ED d/t SOB, nausea, vomiting and AMS for one day. Patient found to be in DKA and admitted to the MICU for further management.     ==============NEURO=============  #Acute metabolic encephalopathy   - patient agitated and confused on admission (normally A+Ox3, on admission A+Ox0)  - likely secondary to DKA   - infectious workup as below, will replete electrolytes as needed   - currently on haldol IV q6 for agitation     #Parkinsons  - patient diagnosed with Parkinson's in 2017, follows with Dr. Zelaya outpatient   - Creon 360 units, 2 capsules 3x/day    =========CARDIOVASCULAR=========  #HTN   - blood pressure currently well controlled   - On losartan 100 mg at home    ============RESPIRATORY==========  #Shortness of breath   - Patient's son reports she was experiencing SOB in the day prior to admission   - CXR clear, RVP and covid negative   - blood cultures pending  - patient currently saturating well on room air     ============RENAL==============  #AZALEA   - patient with BUN 63, Cr 2.14 on admission (baseline creatinine 0.8), 2.61 now  - likely pre-renal in the setting of DKA   - will treat DKA and continue to trend BUN and creatinine    #Anion gap metabolic acidosis   - on admission pH 7.13, bicarb 8, AG 37   - likely multifactorial in origin with DKA, uremia and lactate contributing   - will continue to trend lactate and treat DKA and pre-renal AZALEA    ============GI===============  #Nausea and vomiting   - patient with n/v on admission, likely secondary to DKA   - will treat nausea as needed and continue to monitor     - Diet: NPO while on insulin drip     ============ENDO============  #Diabetes with DKA  - Patient with hx of DM1, non-complaint with basal bolus at home, A1C 9.4% on admission  - on admission glucose 1029, pH 7.13, bicarb 8, AG 37, BHB 9, K 6.0   - patient currently on insulin drip at 9units per hour, will adjust per DKA protocol   - s/p 2L of fluids, now on LR at 150cc/hour  - BMP q4, VBG q4 and finger sticks q1   - patient NPO while on insulin drip     #Hypothyroidism   - patient with thyroid cancer s/p resection in 2000 now with hypothyroidism   - follows with Dr. Ennis outpatient, currently on tirosint levothyroxine sodium) 125mcg daily   - will place patient on IV levothyroxine equivalent   - TSH 10/2021 1.00, will obtain TSH in AM     ===========HEME/ONC===========  - Trend H/H  - A/C: subcutaneous heparin     =============ID=============  #SIRS   - patient with leukocytosis and tachycardia on admission   - UA negative for infection, CXR clear lungs   - follow up RVP, COVID, and blood cultures   - currently monitoring off antibiotics, higher suspicion for viral illness     ===========Lines/Tubes/Salas===========  - peripherals in tact  - GOC: full code                71F hx of T1DM (since age 38), Parkinsons (diagnosed 2017), bladder incontinence, anxiety/depression, HTN, HLD, thyroid cancer s/p resection in 2000 now with hypothyroidism presents to the ED d/t SOB, nausea, vomiting and AMS for one day. Patient found to be in DKA and admitted to the MICU for further management.     ==============NEURO=============  #Acute metabolic encephalopathy   - patient agitated and confused on admission (normally A+Ox3, on admission A+Ox0)  - likely secondary to DKA   - infectious workup as below, will replete electrolytes as needed   - currently on haldol IV q6 for agitation     #Parkinsons  - patient diagnosed with Parkinson's in 2017, follows with Dr. Zelaya outpatient   - Creon 18273 units, 2 capsules 3x/day    =========CARDIOVASCULAR=========  #HTN   - blood pressure currently well controlled   - On losartan 100 mg at home    ============RESPIRATORY==========  #Shortness of breath   - Patient's son reports she was experiencing SOB in the day prior to admission   - CXR clear, RVP and covid negative   - blood cultures pending  - patient currently saturating well on room air     ============RENAL==============  #AZALEA   - patient with BUN 63, Cr 2.14 on admission (baseline creatinine 0.8), 2.61 now  - likely pre-renal in the setting of DKA   - will treat DKA and continue to trend BUN and creatinine    #Anion gap metabolic acidosis   - on admission pH 7.13, bicarb 8, AG 37   - likely multifactorial in origin with DKA, uremia and lactate contributing   - will continue to trend lactate and treat DKA and pre-renal AZALEA    ============GI===============  #Nausea and vomiting   - patient with n/v on admission, likely secondary to DKA   - will treat nausea as needed and continue to monitor     - Diet: NPO while on insulin drip     ============ENDO============  #Diabetes with DKA  - Patient with hx of DM1, non-complaint with basal bolus at home, A1C 9.4% on admission  - on admission glucose 1029, pH 7.13, bicarb 8, AG 37, BHB 9, K 6.0   - patient currently on insulin drip at 9units per hour, will adjust per DKA protocol   - s/p 2L of fluids, now on LR at 150cc/hour  - BMP q4, VBG q4 and finger sticks q1   - patient NPO while on insulin drip     #Hypothyroidism   - patient with thyroid cancer s/p resection in 2000 now with hypothyroidism   - follows with Dr. Ennis outpatient, currently on tirosint levothyroxine sodium) 125mcg daily   - will place patient on IV levothyroxine equivalent   - TSH 10/2021 1.00,  TSH here WNL    ===========HEME/ONC===========  - Trend H/H  - A/C: subcutaneous heparin     =============ID=============  #SIRS   - patient with leukocytosis and tachycardia on admission   - UA negative for infection, CXR clear lungs   - follow up RVP , COVID (neg), and blood cultures   - currently monitoring off antibiotics, higher suspicion for viral illness     ===========Lines/Tubes/Salas===========  - peripherals in tact  - [facility]: full code

## 2021-12-22 LAB
ANION GAP SERPL CALC-SCNC: 10 MMOL/L — SIGNIFICANT CHANGE UP (ref 7–14)
ANION GAP SERPL CALC-SCNC: 13 MMOL/L — SIGNIFICANT CHANGE UP (ref 7–14)
ANION GAP SERPL CALC-SCNC: 13 MMOL/L — SIGNIFICANT CHANGE UP (ref 7–14)
APPEARANCE UR: ABNORMAL
BACTERIA # UR AUTO: NEGATIVE — SIGNIFICANT CHANGE UP
BASE EXCESS BLDV CALC-SCNC: -1.3 MMOL/L — SIGNIFICANT CHANGE UP (ref -2–3)
BASOPHILS # BLD AUTO: 0.04 K/UL — SIGNIFICANT CHANGE UP (ref 0–0.2)
BASOPHILS NFR BLD AUTO: 0.2 % — SIGNIFICANT CHANGE UP (ref 0–2)
BILIRUB UR-MCNC: NEGATIVE — SIGNIFICANT CHANGE UP
BLOOD GAS VENOUS COMPREHENSIVE RESULT: SIGNIFICANT CHANGE UP
BUN SERPL-MCNC: 34 MG/DL — HIGH (ref 7–23)
BUN SERPL-MCNC: 42 MG/DL — HIGH (ref 7–23)
BUN SERPL-MCNC: 48 MG/DL — HIGH (ref 7–23)
CALCIUM SERPL-MCNC: 8.9 MG/DL — SIGNIFICANT CHANGE UP (ref 8.4–10.5)
CALCIUM SERPL-MCNC: 9 MG/DL — SIGNIFICANT CHANGE UP (ref 8.4–10.5)
CALCIUM SERPL-MCNC: 9.1 MG/DL — SIGNIFICANT CHANGE UP (ref 8.4–10.5)
CHLORIDE BLDV-SCNC: 108 MMOL/L — SIGNIFICANT CHANGE UP (ref 96–108)
CHLORIDE SERPL-SCNC: 108 MMOL/L — HIGH (ref 98–107)
CO2 BLDV-SCNC: 26.2 MMOL/L — HIGH (ref 22–26)
CO2 SERPL-SCNC: 20 MMOL/L — LOW (ref 22–31)
CO2 SERPL-SCNC: 21 MMOL/L — LOW (ref 22–31)
CO2 SERPL-SCNC: 22 MMOL/L — SIGNIFICANT CHANGE UP (ref 22–31)
COLOR SPEC: YELLOW — SIGNIFICANT CHANGE UP
CREAT SERPL-MCNC: 1.02 MG/DL — SIGNIFICANT CHANGE UP (ref 0.5–1.3)
CREAT SERPL-MCNC: 1.3 MG/DL — SIGNIFICANT CHANGE UP (ref 0.5–1.3)
CREAT SERPL-MCNC: 1.51 MG/DL — HIGH (ref 0.5–1.3)
CULTURE RESULTS: NO GROWTH — SIGNIFICANT CHANGE UP
DIFF PNL FLD: ABNORMAL
EOSINOPHIL # BLD AUTO: 0.01 K/UL — SIGNIFICANT CHANGE UP (ref 0–0.5)
EOSINOPHIL NFR BLD AUTO: 0.1 % — SIGNIFICANT CHANGE UP (ref 0–6)
EPI CELLS # UR: 2 /HPF — SIGNIFICANT CHANGE UP (ref 0–5)
GAS PNL BLDV: 140 MMOL/L — SIGNIFICANT CHANGE UP (ref 136–145)
GLUCOSE BLDC GLUCOMTR-MCNC: 100 MG/DL — HIGH (ref 70–99)
GLUCOSE BLDC GLUCOMTR-MCNC: 127 MG/DL — HIGH (ref 70–99)
GLUCOSE BLDC GLUCOMTR-MCNC: 135 MG/DL — HIGH (ref 70–99)
GLUCOSE BLDC GLUCOMTR-MCNC: 135 MG/DL — HIGH (ref 70–99)
GLUCOSE BLDC GLUCOMTR-MCNC: 138 MG/DL — HIGH (ref 70–99)
GLUCOSE BLDC GLUCOMTR-MCNC: 148 MG/DL — HIGH (ref 70–99)
GLUCOSE BLDC GLUCOMTR-MCNC: 176 MG/DL — HIGH (ref 70–99)
GLUCOSE BLDC GLUCOMTR-MCNC: 178 MG/DL — HIGH (ref 70–99)
GLUCOSE BLDC GLUCOMTR-MCNC: 184 MG/DL — HIGH (ref 70–99)
GLUCOSE BLDC GLUCOMTR-MCNC: 184 MG/DL — HIGH (ref 70–99)
GLUCOSE BLDC GLUCOMTR-MCNC: 186 MG/DL — HIGH (ref 70–99)
GLUCOSE BLDC GLUCOMTR-MCNC: 192 MG/DL — HIGH (ref 70–99)
GLUCOSE BLDC GLUCOMTR-MCNC: 193 MG/DL — HIGH (ref 70–99)
GLUCOSE BLDC GLUCOMTR-MCNC: 199 MG/DL — HIGH (ref 70–99)
GLUCOSE BLDC GLUCOMTR-MCNC: 204 MG/DL — HIGH (ref 70–99)
GLUCOSE BLDC GLUCOMTR-MCNC: 290 MG/DL — HIGH (ref 70–99)
GLUCOSE BLDC GLUCOMTR-MCNC: 307 MG/DL — HIGH (ref 70–99)
GLUCOSE BLDC GLUCOMTR-MCNC: 336 MG/DL — HIGH (ref 70–99)
GLUCOSE BLDC GLUCOMTR-MCNC: 90 MG/DL — SIGNIFICANT CHANGE UP (ref 70–99)
GLUCOSE BLDC GLUCOMTR-MCNC: 92 MG/DL — SIGNIFICANT CHANGE UP (ref 70–99)
GLUCOSE BLDC GLUCOMTR-MCNC: 99 MG/DL — SIGNIFICANT CHANGE UP (ref 70–99)
GLUCOSE BLDC GLUCOMTR-MCNC: 99 MG/DL — SIGNIFICANT CHANGE UP (ref 70–99)
GLUCOSE BLDV-MCNC: 212 MG/DL — HIGH (ref 70–99)
GLUCOSE SERPL-MCNC: 188 MG/DL — HIGH (ref 70–99)
GLUCOSE SERPL-MCNC: 216 MG/DL — HIGH (ref 70–99)
GLUCOSE SERPL-MCNC: 319 MG/DL — HIGH (ref 70–99)
GLUCOSE UR QL: ABNORMAL
HCO3 BLDV-SCNC: 25 MMOL/L — SIGNIFICANT CHANGE UP (ref 22–29)
HCT VFR BLD CALC: 39.4 % — SIGNIFICANT CHANGE UP (ref 34.5–45)
HCT VFR BLDA CALC: 37 % — SIGNIFICANT CHANGE UP (ref 34.5–46.5)
HGB BLD CALC-MCNC: 12.4 G/DL — SIGNIFICANT CHANGE UP (ref 11.5–15.5)
HGB BLD-MCNC: 12.6 G/DL — SIGNIFICANT CHANGE UP (ref 11.5–15.5)
IANC: 15.68 K/UL — HIGH (ref 1.5–8.5)
IMM GRANULOCYTES NFR BLD AUTO: 0.4 % — SIGNIFICANT CHANGE UP (ref 0–1.5)
KETONES UR-MCNC: ABNORMAL
LACTATE BLDV-MCNC: 1.7 MMOL/L — SIGNIFICANT CHANGE UP (ref 0.5–2)
LEUKOCYTE ESTERASE UR-ACNC: ABNORMAL
LYMPHOCYTES # BLD AUTO: 1.22 K/UL — SIGNIFICANT CHANGE UP (ref 1–3.3)
LYMPHOCYTES # BLD AUTO: 6.7 % — LOW (ref 13–44)
MAGNESIUM SERPL-MCNC: 2.3 MG/DL — SIGNIFICANT CHANGE UP (ref 1.6–2.6)
MAGNESIUM SERPL-MCNC: 2.4 MG/DL — SIGNIFICANT CHANGE UP (ref 1.6–2.6)
MAGNESIUM SERPL-MCNC: 2.4 MG/DL — SIGNIFICANT CHANGE UP (ref 1.6–2.6)
MCHC RBC-ENTMCNC: 28.5 PG — SIGNIFICANT CHANGE UP (ref 27–34)
MCHC RBC-ENTMCNC: 32 GM/DL — SIGNIFICANT CHANGE UP (ref 32–36)
MCV RBC AUTO: 89.1 FL — SIGNIFICANT CHANGE UP (ref 80–100)
MONOCYTES # BLD AUTO: 1.18 K/UL — HIGH (ref 0–0.9)
MONOCYTES NFR BLD AUTO: 6.5 % — SIGNIFICANT CHANGE UP (ref 2–14)
NEUTROPHILS # BLD AUTO: 15.68 K/UL — HIGH (ref 1.8–7.4)
NEUTROPHILS NFR BLD AUTO: 86.1 % — HIGH (ref 43–77)
NITRITE UR-MCNC: NEGATIVE — SIGNIFICANT CHANGE UP
NRBC # BLD: 0 /100 WBCS — SIGNIFICANT CHANGE UP
NRBC # FLD: 0 K/UL — SIGNIFICANT CHANGE UP
PCO2 BLDV: 46 MMHG — HIGH (ref 39–42)
PH BLDV: 7.34 — SIGNIFICANT CHANGE UP (ref 7.32–7.43)
PH UR: 6 — SIGNIFICANT CHANGE UP (ref 5–8)
PHOSPHATE SERPL-MCNC: 2.3 MG/DL — LOW (ref 2.5–4.5)
PHOSPHATE SERPL-MCNC: 2.5 MG/DL — SIGNIFICANT CHANGE UP (ref 2.5–4.5)
PHOSPHATE SERPL-MCNC: 2.7 MG/DL — SIGNIFICANT CHANGE UP (ref 2.5–4.5)
PLATELET # BLD AUTO: 197 K/UL — SIGNIFICANT CHANGE UP (ref 150–400)
PO2 BLDV: 57 MMHG — SIGNIFICANT CHANGE UP
POTASSIUM BLDV-SCNC: 4.3 MMOL/L — SIGNIFICANT CHANGE UP (ref 3.5–5.1)
POTASSIUM SERPL-MCNC: 4.1 MMOL/L — SIGNIFICANT CHANGE UP (ref 3.5–5.3)
POTASSIUM SERPL-MCNC: 4.4 MMOL/L — SIGNIFICANT CHANGE UP (ref 3.5–5.3)
POTASSIUM SERPL-MCNC: 5.2 MMOL/L — SIGNIFICANT CHANGE UP (ref 3.5–5.3)
POTASSIUM SERPL-SCNC: 4.1 MMOL/L — SIGNIFICANT CHANGE UP (ref 3.5–5.3)
POTASSIUM SERPL-SCNC: 4.4 MMOL/L — SIGNIFICANT CHANGE UP (ref 3.5–5.3)
POTASSIUM SERPL-SCNC: 5.2 MMOL/L — SIGNIFICANT CHANGE UP (ref 3.5–5.3)
PROT UR-MCNC: ABNORMAL
RBC # BLD: 4.42 M/UL — SIGNIFICANT CHANGE UP (ref 3.8–5.2)
RBC # FLD: 17.2 % — HIGH (ref 10.3–14.5)
RBC CASTS # UR COMP ASSIST: 24 /HPF — HIGH (ref 0–4)
SAO2 % BLDV: 86.7 % — SIGNIFICANT CHANGE UP
SODIUM SERPL-SCNC: 140 MMOL/L — SIGNIFICANT CHANGE UP (ref 135–145)
SODIUM SERPL-SCNC: 141 MMOL/L — SIGNIFICANT CHANGE UP (ref 135–145)
SODIUM SERPL-SCNC: 142 MMOL/L — SIGNIFICANT CHANGE UP (ref 135–145)
SP GR SPEC: 1.02 — SIGNIFICANT CHANGE UP (ref 1–1.05)
SPECIMEN SOURCE: SIGNIFICANT CHANGE UP
UROBILINOGEN FLD QL: SIGNIFICANT CHANGE UP
WBC # BLD: 18.21 K/UL — HIGH (ref 3.8–10.5)
WBC # FLD AUTO: 18.21 K/UL — HIGH (ref 3.8–10.5)
WBC UR QL: 82 /HPF — HIGH (ref 0–5)

## 2021-12-22 PROCEDURE — 99233 SBSQ HOSP IP/OBS HIGH 50: CPT | Mod: GC

## 2021-12-22 RX ORDER — INSULIN LISPRO 100/ML
7 VIAL (ML) SUBCUTANEOUS
Refills: 0 | Status: DISCONTINUED | OUTPATIENT
Start: 2021-12-23 | End: 2021-12-23

## 2021-12-22 RX ORDER — INSULIN LISPRO 100/ML
5 VIAL (ML) SUBCUTANEOUS
Refills: 0 | Status: DISCONTINUED | OUTPATIENT
Start: 2021-12-22 | End: 2021-12-22

## 2021-12-22 RX ORDER — DEXTROSE 50 % IN WATER 50 %
15 SYRINGE (ML) INTRAVENOUS ONCE
Refills: 0 | Status: DISCONTINUED | OUTPATIENT
Start: 2021-12-22 | End: 2021-12-31

## 2021-12-22 RX ORDER — DEXTROSE 50 % IN WATER 50 %
25 SYRINGE (ML) INTRAVENOUS ONCE
Refills: 0 | Status: DISCONTINUED | OUTPATIENT
Start: 2021-12-22 | End: 2021-12-31

## 2021-12-22 RX ORDER — INSULIN GLARGINE 100 [IU]/ML
18 INJECTION, SOLUTION SUBCUTANEOUS EVERY MORNING
Refills: 0 | Status: DISCONTINUED | OUTPATIENT
Start: 2021-12-22 | End: 2021-12-23

## 2021-12-22 RX ORDER — POTASSIUM PHOSPHATE, MONOBASIC POTASSIUM PHOSPHATE, DIBASIC 236; 224 MG/ML; MG/ML
15 INJECTION, SOLUTION INTRAVENOUS ONCE
Refills: 0 | Status: COMPLETED | OUTPATIENT
Start: 2021-12-22 | End: 2021-12-22

## 2021-12-22 RX ORDER — SODIUM CHLORIDE 9 MG/ML
1000 INJECTION, SOLUTION INTRAVENOUS
Refills: 0 | Status: DISCONTINUED | OUTPATIENT
Start: 2021-12-22 | End: 2021-12-31

## 2021-12-22 RX ORDER — INSULIN LISPRO 100/ML
VIAL (ML) SUBCUTANEOUS
Refills: 0 | Status: DISCONTINUED | OUTPATIENT
Start: 2021-12-22 | End: 2021-12-23

## 2021-12-22 RX ORDER — INSULIN LISPRO 100/ML
7 VIAL (ML) SUBCUTANEOUS
Refills: 0 | Status: DISCONTINUED | OUTPATIENT
Start: 2021-12-22 | End: 2021-12-23

## 2021-12-22 RX ORDER — DEXTROSE MONOHYDRATE, SODIUM CHLORIDE, AND POTASSIUM CHLORIDE 50; .745; 4.5 G/1000ML; G/1000ML; G/1000ML
1000 INJECTION, SOLUTION INTRAVENOUS
Refills: 0 | Status: DISCONTINUED | OUTPATIENT
Start: 2021-12-22 | End: 2021-12-23

## 2021-12-22 RX ORDER — DEXTROSE 50 % IN WATER 50 %
12.5 SYRINGE (ML) INTRAVENOUS ONCE
Refills: 0 | Status: DISCONTINUED | OUTPATIENT
Start: 2021-12-22 | End: 2021-12-31

## 2021-12-22 RX ORDER — DEXTROSE 50 % IN WATER 50 %
12.5 SYRINGE (ML) INTRAVENOUS ONCE
Refills: 0 | Status: COMPLETED | OUTPATIENT
Start: 2021-12-22 | End: 2021-12-22

## 2021-12-22 RX ORDER — GLUCAGON INJECTION, SOLUTION 0.5 MG/.1ML
1 INJECTION, SOLUTION SUBCUTANEOUS ONCE
Refills: 0 | Status: DISCONTINUED | OUTPATIENT
Start: 2021-12-22 | End: 2021-12-31

## 2021-12-22 RX ADMIN — RASAGILINE 1 MILLIGRAM(S): 0.5 TABLET ORAL at 11:20

## 2021-12-22 RX ADMIN — POTASSIUM PHOSPHATE, MONOBASIC POTASSIUM PHOSPHATE, DIBASIC 62.5 MILLIMOLE(S): 236; 224 INJECTION, SOLUTION INTRAVENOUS at 12:01

## 2021-12-22 RX ADMIN — Medication 0.5 MILLIGRAM(S): at 22:08

## 2021-12-22 RX ADMIN — LOSARTAN POTASSIUM 100 MILLIGRAM(S): 100 TABLET, FILM COATED ORAL at 06:19

## 2021-12-22 RX ADMIN — ESCITALOPRAM OXALATE 10 MILLIGRAM(S): 10 TABLET, FILM COATED ORAL at 11:20

## 2021-12-22 RX ADMIN — Medication 100 MICROGRAM(S): at 22:08

## 2021-12-22 RX ADMIN — CARBIDOPA AND LEVODOPA 1 TABLET(S): 25; 100 TABLET ORAL at 06:19

## 2021-12-22 RX ADMIN — INSULIN GLARGINE 18 UNIT(S): 100 INJECTION, SOLUTION SUBCUTANEOUS at 10:07

## 2021-12-22 RX ADMIN — HEPARIN SODIUM 7500 UNIT(S): 5000 INJECTION INTRAVENOUS; SUBCUTANEOUS at 14:11

## 2021-12-22 RX ADMIN — HEPARIN SODIUM 7500 UNIT(S): 5000 INJECTION INTRAVENOUS; SUBCUTANEOUS at 22:08

## 2021-12-22 RX ADMIN — DEXTROSE MONOHYDRATE, SODIUM CHLORIDE, AND POTASSIUM CHLORIDE 50 MILLILITER(S): 50; .745; 4.5 INJECTION, SOLUTION INTRAVENOUS at 12:01

## 2021-12-22 RX ADMIN — CHLORHEXIDINE GLUCONATE 1 APPLICATION(S): 213 SOLUTION TOPICAL at 06:20

## 2021-12-22 RX ADMIN — LORATADINE 10 MILLIGRAM(S): 10 TABLET ORAL at 11:21

## 2021-12-22 RX ADMIN — Medication 7 UNIT(S): at 16:18

## 2021-12-22 RX ADMIN — CARBIDOPA AND LEVODOPA 1 TABLET(S): 25; 100 TABLET ORAL at 14:19

## 2021-12-22 RX ADMIN — Medication 5 UNIT(S): at 14:52

## 2021-12-22 RX ADMIN — HEPARIN SODIUM 7500 UNIT(S): 5000 INJECTION INTRAVENOUS; SUBCUTANEOUS at 06:19

## 2021-12-22 RX ADMIN — Medication 8: at 16:49

## 2021-12-22 RX ADMIN — Medication 12.5 GRAM(S): at 12:37

## 2021-12-22 RX ADMIN — CARBIDOPA AND LEVODOPA 1 TABLET(S): 25; 100 TABLET ORAL at 22:08

## 2021-12-22 NOTE — DIETITIAN INITIAL EVALUATION ADULT. - SIGNS/SYMPTOMS
HbA1c 9.4%, elevated serum & POCT glucose values. 20lb reported weight increase w current BMI=35.2 which is consistent w class II/moderate obesity.

## 2021-12-22 NOTE — DIETITIAN INITIAL EVALUATION ADULT. - OTHER INFO
72yo F w Hx of DM1, Parkinson's, HTN, HLD.      Pt. denies nausea/vomiting/diarrhea/constipation, or issues with chewing/swallowing.  Allergy to shellfish and tuna.    Per H&P, it is documented that son reports Pt. is not adherent with checking blood glucose.  Pt. educated by outpatient endocrinology earlier this month and in regards to nutrition in Oct 2021.  Pt. lethargic at time of visit, however does answer nutrition related questions.  States she was wearing a CGM recently but stopped using it.  Claims she checks her blood glucose "4x/d" with frequent episodes of hypoglycemia (4-5x/week).  Cannot specify time of day when she experiences hypoglycemia.  Corrects with juice.  Discussed prevention/management of low blood glucose.  Denies missing any insulin doses PTA.    Endorses weight gain since the COVID pandemic of 20lbs.    RDN discussed therapeutic diet modifications.    Of note, Pt. was taking Byjyo50P (2 capsules PO before meals), which is prescribed by her outpatient GI 2/2 to frequent loose BMs.  Has resolved since initiating enzymes.  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

## 2021-12-22 NOTE — DIETITIAN INITIAL EVALUATION ADULT. - PERTINENT MEDS FT
MEDICATIONS  (STANDING):  carbidopa/levodopa  25/100 1 Tablet(s) Oral three times a day  chlorhexidine 4% Liquid 1 Application(s) Topical <User Schedule>  dextrose 40% Gel 15 Gram(s) Oral once  dextrose 5% + sodium chloride 0.45% with potassium chloride 20 mEq/L 1000 milliLiter(s) (50 mL/Hr) IV Continuous <Continuous>  dextrose 5%. 1000 milliLiter(s) (50 mL/Hr) IV Continuous <Continuous>  dextrose 5%. 1000 milliLiter(s) (100 mL/Hr) IV Continuous <Continuous>  dextrose 50% Injectable 25 Gram(s) IV Push once  dextrose 50% Injectable 12.5 Gram(s) IV Push once  dextrose 50% Injectable 25 Gram(s) IV Push once  escitalopram 10 milliGRAM(s) Oral daily  glucagon  Injectable 1 milliGRAM(s) IntraMuscular once  heparin   Injectable 7500 Unit(s) SubCutaneous every 8 hours  insulin glargine Injectable (LANTUS) 18 Unit(s) SubCutaneous every morning  insulin lispro (ADMELOG) corrective regimen sliding scale   SubCutaneous three times a day before meals  insulin lispro Injectable (ADMELOG) 5 Unit(s) SubCutaneous before lunch  insulin lispro Injectable (ADMELOG) 5 Unit(s) SubCutaneous before dinner  levothyroxine Injectable 100 MICROGram(s) IV Push at bedtime  loratadine 10 milliGRAM(s) Oral daily  LORazepam     Tablet 0.5 milliGRAM(s) Oral at bedtime  losartan 100 milliGRAM(s) Oral daily  rasagiline Tablet 1 milliGRAM(s) Oral daily    MEDICATIONS  (PRN):  haloperidol    Injectable 2 milliGRAM(s) IV Push every 6 hours PRN Agitation

## 2021-12-22 NOTE — DIETITIAN INITIAL EVALUATION ADULT. - PERTINENT LABORATORY DATA
12-22    140  |  108<H>  |  42<H>  ----------------------------<  188<H>  4.1   |  22  |  1.30    Ca    9.1      22 Dec 2021 06:26  Phos  2.3     12-22  Mg     2.40     12-22    TPro  6.2  /  Alb  3.7  /  TBili  0.7  /  DBili  x   /  AST  38<H>  /  ALT  6   /  AlkPhos  115  12-20    HbA1c 9.4%    CAPILLARY BLOOD GLUCOSE      POCT Blood Glucose.: 99 mg/dL (22 Dec 2021 12:09)

## 2021-12-22 NOTE — PROGRESS NOTE ADULT - ASSESSMENT
71F hx of T1DM (since age 38), Parkinsons (diagnosed 2017), bladder incontinence, anxiety/depression, HTN, HLD, thyroid cancer s/p resection in 2000 now with hypothyroidism presents to the ED d/t SOB, nausea, vomiting and AMS for one day. Patient found to be in DKA and admitted to the MICU for further management.     ==============NEURO=============  #Acute metabolic encephalopathy   - patient agitated and confused on admission (normally A+Ox3, on admission A+Ox0) - currently AOx3  - likely secondary to DKA   - infectious workup as below, will replete electrolytes as needed   - currently on haldol IV q6 for agitation     #Parkinsons  - patient diagnosed with Parkinson's in 2017, follows with Dr. Zelaya outpatient   - Creon 58548 units, 2 capsules 3x/day  - receiving home meds    =========CARDIOVASCULAR=========  #HTN   - blood pressure currently well controlled   - On losartan 100 mg at home    ============RESPIRATORY==========  #Shortness of breath   - Patient's son reports she was experiencing SOB in the day prior to admission   - CXR clear, RVP and covid negative   - blood cultures pending  - patient currently saturating well on room air     ============RENAL==============  #AZALEA   - patient with BUN 63, Cr 2.14 on admission (baseline creatinine 0.8), 1.3 now  - likely pre-renal in the setting of DKA   - will treat DKA and continue to trend BUN and creatinine    #Anion gap metabolic acidosis   - on admission pH 7.13, bicarb 8, AG 37   - likely multifactorial in origin with DKA, uremia and lactate contributing   - will continue to trend lactate and treat DKA and pre-renal AZALEA  - gap is 13 --> 10 now     ============GI===============  #Nausea and vomiting   - patient with n/v on admission, likely secondary to DKA   - will treat nausea as needed and continue to monitor     - Diet: will feed soft diet    ============ENDO============  #Diabetes with DKA  - Patient with hx of DM1, non-complaint with basal bolus at home, A1C 9.4% on admission  - on admission glucose 1029, pH 7.13, bicarb 8, AG 37, BHB 9, K 6.0   - patient currently on insulin drip at 9units per hour, will adjust per DKA protocol   - s/p 2L of fluids, now on D5 until patient eats  - BMP q4, VBG q4 and finger sticks q1   - will feed now that patient's gap is closed  - Lantus 18U as per endo recs  - Off drip: lispro 5U TID + mod dose SS (day) + low dose SS (night), carb consistent diet     #Hypothyroidism   - patient with thyroid cancer s/p resection in 2000 now with hypothyroidism   - follows with Dr. Ennis outpatient, currently on tirosint levothyroxine sodium) 125mcg daily   - 100 mg IV levothyroxine equivalent   - TSH 10/2021 1.00,  TSH here WNL    ===========HEME/ONC===========  - Trend H/H  - A/C: subcutaneous heparin     =============ID=============  #SIRS   - patient with leukocytosis and tachycardia on admission   - UA negative for infection, CXR clear lungs   - follow up RVP , COVID (neg), and blood cultures   - currently monitoring off antibiotics, higher suspicion for viral illness     ===========Lines/Tubes/Salas===========  - peripherals in tact  - GOC: full code                71F hx of T1DM (since age 38), Parkinsons (diagnosed 2017), bladder incontinence, anxiety/depression, HTN, HLD, thyroid cancer s/p resection in 2000 now with hypothyroidism presents to the ED d/t SOB, nausea, vomiting and AMS for one day. Patient found to be in DKA and admitted to the MICU for further management.     ==============NEURO=============  #Acute metabolic encephalopathy   - patient agitated and confused on admission (normally A+Ox3, on admission A+Ox0) - currently AOx3  - likely secondary to DKA   - infectious workup as below, will replete electrolytes as needed   - currently on haldol IV q6 for agitation     #Parkinsons  - patient diagnosed with Parkinson's in 2017, follows with Dr. Zelaya outpatient   - Creon 88673 units, 2 capsules 3x/day  - receiving home meds    =========CARDIOVASCULAR=========  #HTN   - blood pressure currently well controlled   - On losartan 100 mg at home    ============RESPIRATORY==========  #Shortness of breath   - Patient's son reports she was experiencing SOB in the day prior to admission   - CXR clear, RVP and covid negative   - blood cultures pending  - patient currently saturating well on room air     ============RENAL==============  #AZALEA   - patient with BUN 63, Cr 2.14 on admission (baseline creatinine 0.8), 1.3 now  - likely pre-renal in the setting of DKA   - will treat DKA and continue to trend BUN and creatinine    #Anion gap metabolic acidosis   - on admission pH 7.13, bicarb 8, AG 37   - likely multifactorial in origin with DKA, uremia and lactate contributing   - will continue to trend lactate and treat DKA and pre-renal AZALEA  - gap is 13 --> 10 now       #hypophosphatemia  - replete 12/22  ============GI===============  #Nausea and vomiting   - patient with n/v on admission, likely secondary to DKA   - will treat nausea as needed and continue to monitor     - Diet: will feed soft diet    ============ENDO============  #Diabetes with DKA  - Patient with hx of DM1, non-complaint with basal bolus at home, A1C 9.4% on admission  - on admission glucose 1029, pH 7.13, bicarb 8, AG 37, BHB 9, K 6.0   - patient currently on insulin drip at 9units per hour, will adjust per DKA protocol   - s/p 2L of fluids, now on D5 until patient eats  - BMP q4, VBG q4 and finger sticks q1   - will feed now that patient's gap is closed  - Lantus 18U as per endo recs  - Off drip: lispro 5U TID + mod dose SS (day) + low dose SS (night), carb consistent diet     #Hypothyroidism   - patient with thyroid cancer s/p resection in 2000 now with hypothyroidism   - follows with Dr. Ennis outpatient, currently on tirosint levothyroxine sodium) 125mcg daily   - 100 mg IV levothyroxine equivalent   - TSH 10/2021 1.00,  TSH here WNL    ===========HEME/ONC===========  - Trend H/H  - A/C: subcutaneous heparin     =============ID=============  #SIRS   - patient with leukocytosis and tachycardia on admission   - UA negative for infection, CXR clear lungs   - follow up RVP , COVID (neg), and blood cultures   - currently monitoring off antibiotics, higher suspicion for viral illness     ===========Lines/Tubes/Salas===========  - peripherals in tact  - GOC: full code

## 2021-12-22 NOTE — DIETITIAN INITIAL EVALUATION ADULT. - ADD RECOMMEND
-Advance diet consistency, once medically appropriate with inclusion of consistent carbohydrate w evening snack, DASH/TLC (cholesterol & Na restricted) diet modifications.

## 2021-12-22 NOTE — DIETITIAN INITIAL EVALUATION ADULT. - ORAL INTAKE PTA/DIET HISTORY
Per Pt., poor PO intake PTA. Pt. could not specify how long her appetite has been decreased for.  Drinks Mark and flavored water.

## 2021-12-22 NOTE — DIETITIAN INITIAL EVALUATION ADULT. - CHIEF COMPLAINT
The patient is a 71y Female presenting to ED for SOB, nausea, vomiting and AMS x 1d. Found to be in DKA & admitted to MICU.

## 2021-12-22 NOTE — CHART NOTE - NSCHARTNOTEFT_GEN_A_CORE
MICU Transfer Note    Transfer from: MICU  Transfer to:  (X  ) Medicine    (  ) Telemetry    (  ) RCU    (  ) Palliative    (  ) Stroke Unit    (  ) _______________    Accepting physician:    HPI:        MICU COURSE:          ASSESSMENT & PLAN:         For Follow-Up:          Vital Signs Last 24 Hrs  T(C): 36.7 (22 Dec 2021 20:00), Max: 36.9 (22 Dec 2021 16:00)  T(F): 98.1 (22 Dec 2021 20:00), Max: 98.5 (22 Dec 2021 16:00)  HR: 84 (22 Dec 2021 20:00) (83 - 97)  BP: 143/68 (22 Dec 2021 20:00) (118/64 - 159/72)  BP(mean): 87 (22 Dec 2021 20:00) (68 - 94)  RR: 20 (22 Dec 2021 20:00) (18 - 25)  SpO2: 94% (22 Dec 2021 20:00) (89% - 100%)  I&O's Summary    21 Dec 2021 07:01  -  22 Dec 2021 07:00  --------------------------------------------------------  IN: 2680.3 mL / OUT: 2380 mL / NET: 300.3 mL    22 Dec 2021 07:01  -  22 Dec 2021 21:30  --------------------------------------------------------  IN: 502.1 mL / OUT: 495 mL / NET: 7.1 mL          MEDICATIONS  (STANDING):  carbidopa/levodopa  25/100 1 Tablet(s) Oral three times a day  chlorhexidine 4% Liquid 1 Application(s) Topical <User Schedule>  dextrose 40% Gel 15 Gram(s) Oral once  dextrose 5% + sodium chloride 0.45% with potassium chloride 20 mEq/L 1000 milliLiter(s) (50 mL/Hr) IV Continuous <Continuous>  dextrose 5%. 1000 milliLiter(s) (100 mL/Hr) IV Continuous <Continuous>  dextrose 5%. 1000 milliLiter(s) (50 mL/Hr) IV Continuous <Continuous>  dextrose 50% Injectable 25 Gram(s) IV Push once  dextrose 50% Injectable 12.5 Gram(s) IV Push once  dextrose 50% Injectable 25 Gram(s) IV Push once  escitalopram 10 milliGRAM(s) Oral daily  glucagon  Injectable 1 milliGRAM(s) IntraMuscular once  heparin   Injectable 7500 Unit(s) SubCutaneous every 8 hours  insulin glargine Injectable (LANTUS) 18 Unit(s) SubCutaneous every morning  insulin lispro (ADMELOG) corrective regimen sliding scale   SubCutaneous three times a day before meals  insulin lispro Injectable (ADMELOG) 7 Unit(s) SubCutaneous before dinner  levothyroxine Injectable 100 MICROGram(s) IV Push at bedtime  loratadine 10 milliGRAM(s) Oral daily  LORazepam     Tablet 0.5 milliGRAM(s) Oral at bedtime  losartan 100 milliGRAM(s) Oral daily  rasagiline Tablet 1 milliGRAM(s) Oral daily    MEDICATIONS  (PRN):  haloperidol    Injectable 2 milliGRAM(s) IV Push every 6 hours PRN Agitation        LABS                                            12.6                  Neurophils% (auto):   86.1   (12-22 @ 02:35):    18.21)-----------(197          Lymphocytes% (auto):  6.7                                           39.4                   Eosinphils% (auto):   0.1      Manual%: Neutrophils x    ; Lymphocytes x    ; Eosinophils x    ; Bands%: x    ; Blasts x                                    141    |  108    |  34                  Calcium: 8.9   / iCa: x      (12-22 @ 17:01)    ----------------------------<  319       Magnesium: 2.30                             5.2     |  20     |  1.02             Phosphorous: 2.5            So Perkins MD  PGY2 Medicine MICU Transfer Note    Transfer from: MICU  Transfer to:  (X  ) Medicine    (  ) Telemetry    (  ) RCU    (  ) Palliative    (  ) Stroke Unit    (  ) _______________    Accepting physician:    HPI:  71F hx of T1DM (since age 38), Parkinsons (diagnosed 2017), bladder incontinence, anxiety/depression, HTN, HLD, thyroid cancer s/p resection in 2000 now with hypothyroidism presents to the ED d/t SOB, nausea, vomiting and AMS that started Monday night. Patient is currently agitated and AOx0 so was unable to obtain information from her. Information was obtained from patient's son at bedside. Per son, patient was at her usual state of health until Monday morning, independent of all ADLs. Patient did not have fevers, chills, cough, chest pain, abdominal pain. Unclear exactly how many episodes of vomiting or the color of the vomitus. Pt did have a UTI 3 weeks ago and was started on abx - son unsure of which one. Pt was previously on a insulin pump but is now on basal-bolus. Per son, patient is not adherent with checking glucose checks and insulin dosing. Pt has never had DKA in the past. Of note patient has been taking Excedrin frequently for many years. Patient COVID vaccinated with Moderna (January initial, August booster). Cardiac cath two years ago within normal limits.     ED course: afebrile, , /65, RR 22, SpO2 98% RA. WBC 24.48, A1C 9.4, K 6.0, bicarb 8, AG 37. BUN 63, Cr 2.14 (baseline creatinine 0.8), glucose 1029, BHB >9.0. VBG pH 7.13, pCO2 27, lactate 6.0. Given haldol 2 for agitation, insulin drip at 9units/hour, and 3L fluids. UA with ketones, CXR with clear lungs         MICU COURSE:  Patient admitted to MICU with concern for DKA possibly in setting of infection versus incorrect medication compliance. Patient started on insulin drip and aggressive fluid repletion. Patient infectious work up returned negative. Endocrine following and patient eventual gap closed and transitioned to basal bolus with resumption of diet. Patient ready to be transferred to floor.      ASSESSMENT & PLAN:   71F hx of T1DM (since age 38), Parkinsons (diagnosed 2017), bladder incontinence, anxiety/depression, HTN, HLD, thyroid cancer s/p resection in 2000 now with hypothyroidism presents to the ED d/t SOB, nausea, vomiting and AMS for one day. Patient found to be in DKA and admitted to the MICU for further management.     ==============NEURO=============  #Acute metabolic encephalopathy  - patient agitated and confused on admission (normally A+Ox3, on admission A+Ox0) - currently AOx3  - likely secondary to DKA   - infectious workup as below, will replete electrolytes as needed   - currently on haldol IV q6 for agitation     #Parkinsons  - patient diagnosed with Parkinson's in 2017, follows with Dr. Zelaya outpatient   - Creon 81427 units, 2 capsules 3x/day  - receiving home meds    =========CARDIOVASCULAR=========  #HTN   - blood pressure currently well controlled   - On losartan 100 mg at home    ============RESPIRATORY==========  #Shortness of breath   - Patient's son reports she was experiencing SOB in the day prior to admission   - CXR clear, RVP and covid negative   - 12/21 Blood cultures NGTD  - patient currently saturating well on room air     ============RENAL==============  #AZALEA   - patient with BUN 63, Cr 2.14 on admission (baseline creatinine 0.8), 1.3 now  - likely pre-renal in the setting of DKA   - will treat DKA and continue to trend BUN and creatinine    #Anion gap metabolic acidosis (RESOLVED)  - on admission pH 7.13, bicarb 8, AG 37   - likely multifactorial in origin with DKA, uremia and lactate contributing   - will continue to trend lactate and treat DKA and pre-renal AZALEA  - gap is 13 --> 10 now     ============GI===============  #Nausea and vomiting   - patient with n/v on admission, likely secondary to DKA   - will treat nausea as needed and continue to monitor     - Diet: will feed soft diet    ============ENDO============  #Diabetes with DKA  - Patient with hx of DM1, non-complaint with basal bolus at home, A1C 9.4% on admission  - on admission glucose 1029, pH 7.13, bicarb 8, AG 37, BHB 9, K 6.0   - patient currently on insulin drip at 9units per hour, will adjust per DKA protocol   - s/p 2L of fluids, now on D5 until patient eats  - Lantus 18U as per endo recs  - Off drip: lispro 5U TID + mod dose SS (day) + low dose SS (night), carb consistent diet     #Hypothyroidism   - patient with thyroid cancer s/p resection in 2000 now with hypothyroidism   - follows with Dr. Ennis outpatient, currently on tirosint levothyroxine sodium 125mcg daily   - 100 mg IV levothyroxine equivalent   - TSH 10/2021 1.00,  TSH here WNL    ===========HEME/ONC===========  - Trend H/H  - A/C: subcutaneous heparin     =============ID=============  #SIRS   - patient with leukocytosis and tachycardia on admission   - UA negative for infection, CXR clear lungs   - follow up RVP , COVID (neg), and blood cultures - Negative  - currently monitoring off antibiotics    ===========Lines/Tubes/Salas===========  - peripherals in tact  - [facility]: full code         For Follow-Up:  [ ] Monitor BG  [ ] Follow up Endocrine recs          Vital Signs Last 24 Hrs  T(C): 36.7 (22 Dec 2021 20:00), Max: 36.9 (22 Dec 2021 16:00)  T(F): 98.1 (22 Dec 2021 20:00), Max: 98.5 (22 Dec 2021 16:00)  HR: 84 (22 Dec 2021 20:00) (83 - 97)  BP: 143/68 (22 Dec 2021 20:00) (118/64 - 159/72)  BP(mean): 87 (22 Dec 2021 20:00) (68 - 94)  RR: 20 (22 Dec 2021 20:00) (18 - 25)  SpO2: 94% (22 Dec 2021 20:00) (89% - 100%)  I&O's Summary    21 Dec 2021 07:01  -  22 Dec 2021 07:00  --------------------------------------------------------  IN: 2680.3 mL / OUT: 2380 mL / NET: 300.3 mL    22 Dec 2021 07:01  -  22 Dec 2021 21:30  --------------------------------------------------------  IN: 502.1 mL / OUT: 495 mL / NET: 7.1 mL        MEDICATIONS  (STANDING):  carbidopa/levodopa  25/100 1 Tablet(s) Oral three times a day  chlorhexidine 4% Liquid 1 Application(s) Topical <User Schedule>  dextrose 40% Gel 15 Gram(s) Oral once  dextrose 5% + sodium chloride 0.45% with potassium chloride 20 mEq/L 1000 milliLiter(s) (50 mL/Hr) IV Continuous <Continuous>  dextrose 5%. 1000 milliLiter(s) (100 mL/Hr) IV Continuous <Continuous>  dextrose 5%. 1000 milliLiter(s) (50 mL/Hr) IV Continuous <Continuous>  dextrose 50% Injectable 25 Gram(s) IV Push once  dextrose 50% Injectable 12.5 Gram(s) IV Push once  dextrose 50% Injectable 25 Gram(s) IV Push once  escitalopram 10 milliGRAM(s) Oral daily  glucagon  Injectable 1 milliGRAM(s) IntraMuscular once  heparin   Injectable 7500 Unit(s) SubCutaneous every 8 hours  insulin glargine Injectable (LANTUS) 18 Unit(s) SubCutaneous every morning  insulin lispro (ADMELOG) corrective regimen sliding scale   SubCutaneous three times a day before meals  insulin lispro Injectable (ADMELOG) 7 Unit(s) SubCutaneous before dinner  levothyroxine Injectable 100 MICROGram(s) IV Push at bedtime  loratadine 10 milliGRAM(s) Oral daily  LORazepam     Tablet 0.5 milliGRAM(s) Oral at bedtime  losartan 100 milliGRAM(s) Oral daily  rasagiline Tablet 1 milliGRAM(s) Oral daily    MEDICATIONS  (PRN):  haloperidol    Injectable 2 milliGRAM(s) IV Push every 6 hours PRN Agitation        LABS                                            12.6                  Neurophils% (auto):   86.1   (12-22 @ 02:35):    18.21)-----------(197          Lymphocytes% (auto):  6.7                                           39.4                   Eosinphils% (auto):   0.1      Manual%: Neutrophils x    ; Lymphocytes x    ; Eosinophils x    ; Bands%: x    ; Blasts x                                    141    |  108    |  34                  Calcium: 8.9   / iCa: x      (12-22 @ 17:01)    ----------------------------<  319       Magnesium: 2.30                             5.2     |  20     |  1.02             Phosphorous: 2.5            So Perkins MD  PGY2 Medicine MICU Transfer Note    Transfer from: MICU  Transfer to:  (X  ) Medicine 969B    Accepting physician:     HPI:  71F hx of T1DM (since age 38), Parkinsons (diagnosed 2017), bladder incontinence, anxiety/depression, HTN, HLD, thyroid cancer s/p resection in 2000 now with hypothyroidism presents to the ED d/t SOB, nausea, vomiting and AMS that started Monday night. Patient is currently agitated and AOx0 so was unable to obtain information from her. Information was obtained from patient's son at bedside. Per son, patient was at her usual state of health until Monday morning, independent of all ADLs. Patient did not have fevers, chills, cough, chest pain, abdominal pain. Unclear exactly how many episodes of vomiting or the color of the vomitus. Pt did have a UTI 3 weeks ago and was started on abx - son unsure of which one. Pt was previously on a insulin pump but is now on basal-bolus. Per son, patient is not adherent with checking glucose checks and insulin dosing. Pt has never had DKA in the past. Of note patient has been taking Excedrin frequently for many years. Patient COVID vaccinated with Moderna (January initial, August booster). Cardiac cath two years ago within normal limits.     ED course: afebrile, , /65, RR 22, SpO2 98% RA. WBC 24.48, A1C 9.4, K 6.0, bicarb 8, AG 37. BUN 63, Cr 2.14 (baseline creatinine 0.8), glucose 1029, BHB >9.0. VBG pH 7.13, pCO2 27, lactate 6.0. Given haldol 2 for agitation, insulin drip at 9units/hour, and 3L fluids. UA with ketones, CXR with clear lungs         MICU COURSE:  Patient admitted to MICU with concern for DKA possibly in setting of infection versus incorrect medication compliance. Patient started on insulin drip and aggressive fluid repletion. Patient infectious work up returned negative. Endocrine following and patient eventual gap closed and transitioned to basal bolus with resumption of diet. Patient ready to be transferred to floor.      ASSESSMENT & PLAN:   71F hx of T1DM (since age 38), Parkinsons (diagnosed 2017), bladder incontinence, anxiety/depression, HTN, HLD, thyroid cancer s/p resection in 2000 now with hypothyroidism presents to the ED d/t SOB, nausea, vomiting and AMS for one day. Patient found to be in DKA and admitted to the MICU for further management.     ==============NEURO=============  #Acute metabolic encephalopathy  - patient agitated and confused on admission (normally A+Ox3, on admission A+Ox0) - currently AOx3  - likely secondary to DKA   - infectious workup as below, will replete electrolytes as needed   - currently on haldol IV q6 for agitation     #Parkinsons  - patient diagnosed with Parkinson's in 2017, follows with Dr. Zelaya outpatient   - Creon 70047 units, 2 capsules 3x/day  - receiving home meds    =========CARDIOVASCULAR=========  #HTN   - blood pressure currently well controlled   - On losartan 100 mg at home    ============RESPIRATORY==========  #Shortness of breath   - Patient's son reports she was experiencing SOB in the day prior to admission   - CXR clear, RVP and covid negative   - 12/21 Blood cultures NGTD  - patient currently saturating well on room air     ============RENAL==============  #AZALEA   - patient with BUN 63, Cr 2.14 on admission (baseline creatinine 0.8), 1.3 now  - likely pre-renal in the setting of DKA   - will treat DKA and continue to trend BUN and creatinine    #Anion gap metabolic acidosis (RESOLVED)  - on admission pH 7.13, bicarb 8, AG 37   - likely multifactorial in origin with DKA, uremia and lactate contributing   - will continue to trend lactate and treat DKA and pre-renal AZALEA  - gap is 13 --> 10 now     ============GI===============  #Nausea and vomiting   - patient with n/v on admission, likely secondary to DKA   - will treat nausea as needed and continue to monitor     - Diet: will feed soft diet    ============ENDO============  #Diabetes with DKA  - Patient with hx of DM1, non-complaint with basal bolus at home, A1C 9.4% on admission  - on admission glucose 1029, pH 7.13, bicarb 8, AG 37, BHB 9, K 6.0   - patient currently on insulin drip at 9units per hour, will adjust per DKA protocol   - s/p 2L of fluids, now on D5 until patient eats  - Lantus 18U as per endo recs  - Off drip: lispro 5U TID + mod dose SS (day) + low dose SS (night), carb consistent diet     #Hypothyroidism   - patient with thyroid cancer s/p resection in 2000 now with hypothyroidism   - follows with Dr. Ennis outpatient, currently on tirosint levothyroxine sodium 125mcg daily   - 100 mg IV levothyroxine equivalent   - TSH 10/2021 1.00,  TSH here WNL    ===========HEME/ONC===========  - Trend H/H  - A/C: subcutaneous heparin     =============ID=============  #SIRS   - patient with leukocytosis and tachycardia on admission   - UA negative for infection, CXR clear lungs   - follow up RVP , COVID (neg), and blood cultures - Negative  - currently monitoring off antibiotics    ===========Lines/Tubes/Salas===========  - peripherals in tact  - [facility]: full code         For Follow-Up:  [ ] Monitor BG  [ ] Follow up Endocrine recs          Vital Signs Last 24 Hrs  T(C): 36.7 (22 Dec 2021 20:00), Max: 36.9 (22 Dec 2021 16:00)  T(F): 98.1 (22 Dec 2021 20:00), Max: 98.5 (22 Dec 2021 16:00)  HR: 84 (22 Dec 2021 20:00) (83 - 97)  BP: 143/68 (22 Dec 2021 20:00) (118/64 - 159/72)  BP(mean): 87 (22 Dec 2021 20:00) (68 - 94)  RR: 20 (22 Dec 2021 20:00) (18 - 25)  SpO2: 94% (22 Dec 2021 20:00) (89% - 100%)  I&O's Summary    21 Dec 2021 07:01  -  22 Dec 2021 07:00  --------------------------------------------------------  IN: 2680.3 mL / OUT: 2380 mL / NET: 300.3 mL    22 Dec 2021 07:01  -  22 Dec 2021 21:30  --------------------------------------------------------  IN: 502.1 mL / OUT: 495 mL / NET: 7.1 mL        MEDICATIONS  (STANDING):  carbidopa/levodopa  25/100 1 Tablet(s) Oral three times a day  chlorhexidine 4% Liquid 1 Application(s) Topical <User Schedule>  dextrose 40% Gel 15 Gram(s) Oral once  dextrose 5% + sodium chloride 0.45% with potassium chloride 20 mEq/L 1000 milliLiter(s) (50 mL/Hr) IV Continuous <Continuous>  dextrose 5%. 1000 milliLiter(s) (100 mL/Hr) IV Continuous <Continuous>  dextrose 5%. 1000 milliLiter(s) (50 mL/Hr) IV Continuous <Continuous>  dextrose 50% Injectable 25 Gram(s) IV Push once  dextrose 50% Injectable 12.5 Gram(s) IV Push once  dextrose 50% Injectable 25 Gram(s) IV Push once  escitalopram 10 milliGRAM(s) Oral daily  glucagon  Injectable 1 milliGRAM(s) IntraMuscular once  heparin   Injectable 7500 Unit(s) SubCutaneous every 8 hours  insulin glargine Injectable (LANTUS) 18 Unit(s) SubCutaneous every morning  insulin lispro (ADMELOG) corrective regimen sliding scale   SubCutaneous three times a day before meals  insulin lispro Injectable (ADMELOG) 7 Unit(s) SubCutaneous before dinner  levothyroxine Injectable 100 MICROGram(s) IV Push at bedtime  loratadine 10 milliGRAM(s) Oral daily  LORazepam     Tablet 0.5 milliGRAM(s) Oral at bedtime  losartan 100 milliGRAM(s) Oral daily  rasagiline Tablet 1 milliGRAM(s) Oral daily    MEDICATIONS  (PRN):  haloperidol    Injectable 2 milliGRAM(s) IV Push every 6 hours PRN Agitation        LABS                                            12.6                  Neurophils% (auto):   86.1   (12-22 @ 02:35):    18.21)-----------(197          Lymphocytes% (auto):  6.7                                           39.4                   Eosinphils% (auto):   0.1      Manual%: Neutrophils x    ; Lymphocytes x    ; Eosinophils x    ; Bands%: x    ; Blasts x                                    141    |  108    |  34                  Calcium: 8.9   / iCa: x      (12-22 @ 17:01)    ----------------------------<  319       Magnesium: 2.30                             5.2     |  20     |  1.02             Phosphorous: 2.5            So Perkins MD  PGY2 Medicine MICU Transfer Note    Transfer from: MICU  Transfer to:  (X  ) Medicine 969B    Accepting physician: Dr. Mona Patton    HPI:  71F hx of T1DM (since age 38), Parkinsons (diagnosed 2017), bladder incontinence, anxiety/depression, HTN, HLD, thyroid cancer s/p resection in 2000 now with hypothyroidism presents to the ED d/t SOB, nausea, vomiting and AMS that started Monday night. Patient is currently agitated and AOx0 so was unable to obtain information from her. Information was obtained from patient's son at bedside. Per son, patient was at her usual state of health until Monday morning, independent of all ADLs. Patient did not have fevers, chills, cough, chest pain, abdominal pain. Unclear exactly how many episodes of vomiting or the color of the vomitus. Pt did have a UTI 3 weeks ago and was started on abx - son unsure of which one. Pt was previously on a insulin pump but is now on basal-bolus. Per son, patient is not adherent with checking glucose checks and insulin dosing. Pt has never had DKA in the past. Of note patient has been taking Excedrin frequently for many years. Patient COVID vaccinated with Moderna (January initial, August booster). Cardiac cath two years ago within normal limits.     ED course: afebrile, , /65, RR 22, SpO2 98% RA. WBC 24.48, A1C 9.4, K 6.0, bicarb 8, AG 37. BUN 63, Cr 2.14 (baseline creatinine 0.8), glucose 1029, BHB >9.0. VBG pH 7.13, pCO2 27, lactate 6.0. Given haldol 2 for agitation, insulin drip at 9units/hour, and 3L fluids. UA with ketones, CXR with clear lungs         MICU COURSE:  Patient admitted to MICU with concern for DKA possibly in setting of infection versus incorrect medication compliance. Patient started on insulin drip and aggressive fluid repletion. Patient infectious work up returned negative. Endocrine following and patient eventual gap closed and transitioned to basal bolus with resumption of diet. Patient ready to be transferred to floor.      ASSESSMENT & PLAN:   71F hx of T1DM (since age 38), Parkinsons (diagnosed 2017), bladder incontinence, anxiety/depression, HTN, HLD, thyroid cancer s/p resection in 2000 now with hypothyroidism presents to the ED d/t SOB, nausea, vomiting and AMS for one day. Patient found to be in DKA and admitted to the MICU for further management.     ==============NEURO=============  #Acute metabolic encephalopathy  - patient agitated and confused on admission (normally A+Ox3, on admission A+Ox0) - currently AOx3  - likely secondary to DKA   - infectious workup as below, will replete electrolytes as needed   - currently on haldol IV q6 for agitation     #Parkinsons  - patient diagnosed with Parkinson's in 2017, follows with Dr. Zelaya outpatient   - Creon 31010 units, 2 capsules 3x/day  - receiving home meds    =========CARDIOVASCULAR=========  #HTN   - blood pressure currently well controlled   - On losartan 100 mg at home    ============RESPIRATORY==========  #Shortness of breath   - Patient's son reports she was experiencing SOB in the day prior to admission   - CXR clear, RVP and covid negative   - 12/21 Blood cultures NGTD  - patient currently saturating well on room air     ============RENAL==============  #AZALEA   - patient with BUN 63, Cr 2.14 on admission (baseline creatinine 0.8), 1.3 now  - likely pre-renal in the setting of DKA   - will treat DKA and continue to trend BUN and creatinine    #Anion gap metabolic acidosis (RESOLVED)  - on admission pH 7.13, bicarb 8, AG 37   - likely multifactorial in origin with DKA, uremia and lactate contributing   - will continue to trend lactate and treat DKA and pre-renal AZALEA  - gap is 13 --> 10 now     ============GI===============  #Nausea and vomiting   - patient with n/v on admission, likely secondary to DKA   - will treat nausea as needed and continue to monitor     - Diet: will feed soft diet    ============ENDO============  #Diabetes with DKA  - Patient with hx of DM1, non-complaint with basal bolus at home, A1C 9.4% on admission  - on admission glucose 1029, pH 7.13, bicarb 8, AG 37, BHB 9, K 6.0   - patient currently on insulin drip at 9units per hour, will adjust per DKA protocol   - s/p 2L of fluids, now on D5 until patient eats  - Lantus 18U as per endo recs  - Off drip: lispro 5U TID + mod dose SS (day) + low dose SS (night), carb consistent diet     #Hypothyroidism   - patient with thyroid cancer s/p resection in 2000 now with hypothyroidism   - follows with Dr. Ennis outpatient, currently on tirosint levothyroxine sodium 125mcg daily   - 100 mg IV levothyroxine equivalent   - TSH 10/2021 1.00,  TSH here WNL    ===========HEME/ONC===========  - Trend H/H  - A/C: subcutaneous heparin     =============ID=============  #SIRS   - patient with leukocytosis and tachycardia on admission   - UA negative for infection, CXR clear lungs   - follow up RVP , COVID (neg), and blood cultures - Negative  - currently monitoring off antibiotics    ===========Lines/Tubes/Salas===========  - peripherals in tact  - [facility]: full code         For Follow-Up:  [ ] Monitor BG  [ ] Follow up Endocrine recs          Vital Signs Last 24 Hrs  T(C): 36.7 (22 Dec 2021 20:00), Max: 36.9 (22 Dec 2021 16:00)  T(F): 98.1 (22 Dec 2021 20:00), Max: 98.5 (22 Dec 2021 16:00)  HR: 84 (22 Dec 2021 20:00) (83 - 97)  BP: 143/68 (22 Dec 2021 20:00) (118/64 - 159/72)  BP(mean): 87 (22 Dec 2021 20:00) (68 - 94)  RR: 20 (22 Dec 2021 20:00) (18 - 25)  SpO2: 94% (22 Dec 2021 20:00) (89% - 100%)  I&O's Summary    21 Dec 2021 07:01  -  22 Dec 2021 07:00  --------------------------------------------------------  IN: 2680.3 mL / OUT: 2380 mL / NET: 300.3 mL    22 Dec 2021 07:01  -  22 Dec 2021 21:30  --------------------------------------------------------  IN: 502.1 mL / OUT: 495 mL / NET: 7.1 mL        MEDICATIONS  (STANDING):  carbidopa/levodopa  25/100 1 Tablet(s) Oral three times a day  chlorhexidine 4% Liquid 1 Application(s) Topical <User Schedule>  dextrose 40% Gel 15 Gram(s) Oral once  dextrose 5% + sodium chloride 0.45% with potassium chloride 20 mEq/L 1000 milliLiter(s) (50 mL/Hr) IV Continuous <Continuous>  dextrose 5%. 1000 milliLiter(s) (100 mL/Hr) IV Continuous <Continuous>  dextrose 5%. 1000 milliLiter(s) (50 mL/Hr) IV Continuous <Continuous>  dextrose 50% Injectable 25 Gram(s) IV Push once  dextrose 50% Injectable 12.5 Gram(s) IV Push once  dextrose 50% Injectable 25 Gram(s) IV Push once  escitalopram 10 milliGRAM(s) Oral daily  glucagon  Injectable 1 milliGRAM(s) IntraMuscular once  heparin   Injectable 7500 Unit(s) SubCutaneous every 8 hours  insulin glargine Injectable (LANTUS) 18 Unit(s) SubCutaneous every morning  insulin lispro (ADMELOG) corrective regimen sliding scale   SubCutaneous three times a day before meals  insulin lispro Injectable (ADMELOG) 7 Unit(s) SubCutaneous before dinner  levothyroxine Injectable 100 MICROGram(s) IV Push at bedtime  loratadine 10 milliGRAM(s) Oral daily  LORazepam     Tablet 0.5 milliGRAM(s) Oral at bedtime  losartan 100 milliGRAM(s) Oral daily  rasagiline Tablet 1 milliGRAM(s) Oral daily    MEDICATIONS  (PRN):  haloperidol    Injectable 2 milliGRAM(s) IV Push every 6 hours PRN Agitation        LABS                                            12.6                  Neurophils% (auto):   86.1   (12-22 @ 02:35):    18.21)-----------(197          Lymphocytes% (auto):  6.7                                           39.4                   Eosinphils% (auto):   0.1      Manual%: Neutrophils x    ; Lymphocytes x    ; Eosinophils x    ; Bands%: x    ; Blasts x                                    141    |  108    |  34                  Calcium: 8.9   / iCa: x      (12-22 @ 17:01)    ----------------------------<  319       Magnesium: 2.30                             5.2     |  20     |  1.02             Phosphorous: 2.5            So Perkins MD  PGY2 Medicine MICU Transfer Note    Transfer from: MICU  Transfer to:  (X  ) Medicine 969B    Accepting physician: Dr. Mona Patton    HPI:  71F hx of T1DM (since age 38), Parkinsons (diagnosed 2017), bladder incontinence, anxiety/depression, HTN, HLD, thyroid cancer s/p resection in 2000 now with hypothyroidism presents to the ED d/t SOB, nausea, vomiting and AMS that started Monday night. Patient is currently agitated and AOx0 so was unable to obtain information from her. Information was obtained from patient's son at bedside. Per son, patient was at her usual state of health until Monday morning, independent of all ADLs. Patient did not have fevers, chills, cough, chest pain, abdominal pain. Unclear exactly how many episodes of vomiting or the color of the vomitus. Pt did have a UTI 3 weeks ago and was started on abx - son unsure of which one. Pt was previously on a insulin pump but is now on basal-bolus. Per son, patient is not adherent with checking glucose checks and insulin dosing. Pt has never had DKA in the past. Of note patient has been taking Excedrin frequently for many years. Patient COVID vaccinated with Moderna (January initial, August booster). Cardiac cath two years ago within normal limits.     ED course: afebrile, , /65, RR 22, SpO2 98% RA. WBC 24.48, A1C 9.4, K 6.0, bicarb 8, AG 37. BUN 63, Cr 2.14 (baseline creatinine 0.8), glucose 1029, BHB >9.0. VBG pH 7.13, pCO2 27, lactate 6.0. Given haldol 2 for agitation, insulin drip at 9units/hour, and 3L fluids. UA with ketones, CXR with clear lungs         MICU COURSE:  Patient admitted to MICU with concern for DKA possibly in setting of infection versus incorrect medication compliance. Patient started on insulin drip and aggressive fluid repletion. Patient infectious work up returned negative. Endocrine following and patient eventual gap closed and transitioned to basal bolus with resumption of diet. Patient ready to be transferred to floor.      ASSESSMENT & PLAN:   71F hx of T1DM (since age 38), Parkinsons (diagnosed 2017), bladder incontinence, anxiety/depression, HTN, HLD, thyroid cancer s/p resection in 2000 now with hypothyroidism presents to the ED d/t SOB, nausea, vomiting and AMS for one day. Patient found to be in DKA and admitted to the MICU for further management.     ==============NEURO=============  #Acute metabolic encephalopathy  - patient agitated and confused on admission (normally A+Ox3, on admission A+Ox0) - currently AOx3  - likely secondary to DKA   - infectious workup as below, will replete electrolytes as needed   - currently on haldol IV q6 for agitation     #Parkinsons  - patient diagnosed with Parkinson's in 2017, follows with Dr. Zelaya outpatient   - Creon 69273 units, 2 capsules 3x/day  - receiving home meds    =========CARDIOVASCULAR=========  #HTN   - blood pressure currently well controlled   - On losartan 100 mg at home    ============RESPIRATORY==========  #Shortness of breath   - Patient's son reports she was experiencing SOB in the day prior to admission   - CXR clear, RVP and covid negative   - 12/21 Blood cultures NGTD    #CRYSTAL not compliant with CPAP  - Monitor SpO2  - Consider starting on CPAP at night    ============RENAL==============  #AZALEA   - patient with BUN 63, Cr 2.14 on admission (baseline creatinine 0.8), 1.3 now  - likely pre-renal in the setting of DKA   - will treat DKA and continue to trend BUN and creatinine    #Anion gap metabolic acidosis (RESOLVED)  - on admission pH 7.13, bicarb 8, AG 37   - likely multifactorial in origin with DKA, uremia and lactate contributing   - will continue to trend lactate and treat DKA and pre-renal AZALEA  - gap is 13 --> 10 now    #UTI  Increased polyuria and new positive UA (12/22)  - No other systemic signs and austin removed prior. Start macrobid.  - Follow up cultures. Note- patient with cephalosporin allergy    ============GI===============  #Nausea and vomiting   - patient with n/v on admission, likely secondary to DKA   - will treat nausea as needed and continue to monitor     - Diet: will feed soft diet    ============ENDO============  #Diabetes with DKA  - Patient with hx of DM1, non-complaint with basal bolus at home, A1C 9.4% on admission  - on admission glucose 1029, pH 7.13, bicarb 8, AG 37, BHB 9, K 6.0   - patient currently on insulin drip at 9units per hour, will adjust per DKA protocol   - s/p 2L of fluids, now on D5 until patient eats  - Lantus 18U as per endo recs  - Off drip: lispro 5U TID + mod dose SS (day) + low dose SS (night), carb consistent diet     #Hypothyroidism   - patient with thyroid cancer s/p resection in 2000 now with hypothyroidism   - follows with Dr. Ennis outpatient, currently on tirosint levothyroxine sodium 125mcg daily   - 100 mg IV levothyroxine equivalent   - TSH 10/2021 1.00,  TSH here WNL    ===========HEME/ONC===========  - Trend H/H  - A/C: subcutaneous heparin     =============ID=============  #SIRS   - patient with leukocytosis and tachycardia on admission   - UA negative for infection, CXR clear lungs   - follow up RVP , COVID (neg), and blood cultures - Negative  - currently monitoring off antibiotics    ===========Lines/Tubes/[place]===========  - peripherals in tact  - [facility]: full code         For Follow-Up:  [ ] Monitor BG  [ ] Follow up Endocrine recs  [ ] Increased urinary frequency after austin removal, no systemic signs. Will treat with macrobid and follow up cultures. Allergic to cephalosporins.  [ ] Monitor SpO2, patient with CRYSTAL prescribed but not used CPAP for several years. Consider CPAP initiation at night.        Vital Signs Last 24 Hrs  T(C): 36.7 (22 Dec 2021 20:00), Max: 36.9 (22 Dec 2021 16:00)  T(F): 98.1 (22 Dec 2021 20:00), Max: 98.5 (22 Dec 2021 16:00)  HR: 84 (22 Dec 2021 20:00) (83 - 97)  BP: 143/68 (22 Dec 2021 20:00) (118/64 - 159/72)  BP(mean): 87 (22 Dec 2021 20:00) (68 - 94)  RR: 20 (22 Dec 2021 20:00) (18 - 25)  SpO2: 94% (22 Dec 2021 20:00) (89% - 100%)  I&O's Summary    21 Dec 2021 07:01  -  22 Dec 2021 07:00  --------------------------------------------------------  IN: 2680.3 mL / OUT: 2380 mL / NET: 300.3 mL    22 Dec 2021 07:01  -  22 Dec 2021 21:30  --------------------------------------------------------  IN: 502.1 mL / OUT: 495 mL / NET: 7.1 mL        MEDICATIONS  (STANDING):  carbidopa/levodopa  25/100 1 Tablet(s) Oral three times a day  chlorhexidine 4% Liquid 1 Application(s) Topical <User Schedule>  dextrose 40% Gel 15 Gram(s) Oral once  dextrose 5% + sodium chloride 0.45% with potassium chloride 20 mEq/L 1000 milliLiter(s) (50 mL/Hr) IV Continuous <Continuous>  dextrose 5%. 1000 milliLiter(s) (100 mL/Hr) IV Continuous <Continuous>  dextrose 5%. 1000 milliLiter(s) (50 mL/Hr) IV Continuous <Continuous>  dextrose 50% Injectable 25 Gram(s) IV Push once  dextrose 50% Injectable 12.5 Gram(s) IV Push once  dextrose 50% Injectable 25 Gram(s) IV Push once  escitalopram 10 milliGRAM(s) Oral daily  glucagon  Injectable 1 milliGRAM(s) IntraMuscular once  heparin   Injectable 7500 Unit(s) SubCutaneous every 8 hours  insulin glargine Injectable (LANTUS) 18 Unit(s) SubCutaneous every morning  insulin lispro (ADMELOG) corrective regimen sliding scale   SubCutaneous three times a day before meals  insulin lispro Injectable (ADMELOG) 7 Unit(s) SubCutaneous before dinner  levothyroxine Injectable 100 MICROGram(s) IV Push at bedtime  loratadine 10 milliGRAM(s) Oral daily  LORazepam     Tablet 0.5 milliGRAM(s) Oral at bedtime  losartan 100 milliGRAM(s) Oral daily  rasagiline Tablet 1 milliGRAM(s) Oral daily    MEDICATIONS  (PRN):  haloperidol    Injectable 2 milliGRAM(s) IV Push every 6 hours PRN Agitation        LABS                                            12.6                  Neurophils% (auto):   86.1   (12-22 @ 02:35):    18.21)-----------(197          Lymphocytes% (auto):  6.7                                           39.4                   Eosinphils% (auto):   0.1      Manual%: Neutrophils x    ; Lymphocytes x    ; Eosinophils x    ; Bands%: x    ; Blasts x                                    141    |  108    |  34                  Calcium: 8.9   / iCa: x      (12-22 @ 17:01)    ----------------------------<  319       Magnesium: 2.30                             5.2     |  20     |  1.02             Phosphorous: 2.5            So Perkins MD  PGY2 Medicine

## 2021-12-22 NOTE — DIETITIAN INITIAL EVALUATION ADULT. - ETIOLOGY
Endocrine dysfunction in setting of Hx of DM1.  Admitted with Dx DKA. Excessive energy intake & physical inactivity.

## 2021-12-22 NOTE — PROGRESS NOTE ADULT - SUBJECTIVE AND OBJECTIVE BOX
INTERVAL HPI/OVERNIGHT EVENTS:    SUBJECTIVE: Patient seen and examined at bedside. Gap closed on 2 sets of labs.     CONSTITUTIONAL: No weakness, fevers or chills  EYES/ENT: No visual changes;  No vertigo or throat pain   NECK: No pain or stiffness  RESPIRATORY: No cough, wheezing, hemoptysis; No shortness of breath  CARDIOVASCULAR: No chest pain or palpitations  GASTROINTESTINAL: No abdominal or epigastric pain. No nausea, vomiting, or hematemesis; No diarrhea or constipation. No melena or hematochezia.  GENITOURINARY: No dysuria, frequency or hematuria  NEUROLOGICAL: No numbness or weakness  SKIN: No itching, rashes    OBJECTIVE:    VITAL SIGNS:  ICU Vital Signs Last 24 Hrs  T(C): 36.7 (22 Dec 2021 08:00), Max: 37.1 (21 Dec 2021 12:00)  T(F): 98 (22 Dec 2021 08:00), Max: 98.7 (21 Dec 2021 12:00)  HR: 83 (22 Dec 2021 08:00) (83 - 97)  BP: 118/64 (22 Dec 2021 08:00) (118/64 - 159/72)  BP(mean): 77 (22 Dec 2021 08:00) (68 - 94)  ABP: --  ABP(mean): --  RR: 19 (22 Dec 2021 08:00) (18 - 25)  SpO2: 98% (22 Dec 2021 08:00) (92% - 100%)         @ 07:  -   @ 07:00  --------------------------------------------------------  IN: 2680.3 mL / OUT: 2380 mL / NET: 300.3 mL     @ 07:01  -   @ 09:06  --------------------------------------------------------  IN: 52.1 mL / OUT: 50 mL / NET: 2.1 mL      CAPILLARY BLOOD GLUCOSE      POCT Blood Glucose.: 148 mg/dL (22 Dec 2021 08:15)        PHYSICAL EXAM:  GENERAL: +lethargic, answers questions then falls asleep, arousable, NAD, well-developed  HEAD:  Atraumatic, Normocephalic  EYES: EOMI, PERRLA, conjunctiva and sclera clear  NECK: Supple, No JVD  CHEST/LUNG: Clear to auscultation bilaterally; No wheeze  HEART: Regular rate and rhythm; No murmurs, rubs, or gallops  ABDOMEN: Soft, Nontender, Nondistended; Bowel sounds present  EXTREMITIES:  2+ Peripheral Pulses, No clubbing, cyanosis, or edema  PSYCH: AAOx3  NEUROLOGY: non-focal  SKIN: No rashes or lesions        MEDICATIONS:  MEDICATIONS  (STANDING):  carbidopa/levodopa  25/100 1 Tablet(s) Oral three times a day  chlorhexidine 4% Liquid 1 Application(s) Topical <User Schedule>  dextrose 40% Gel 15 Gram(s) Oral once  dextrose 5% + sodium chloride 0.45% with potassium chloride 20 mEq/L 1000 milliLiter(s) (50 mL/Hr) IV Continuous <Continuous>  dextrose 5%. 1000 milliLiter(s) (50 mL/Hr) IV Continuous <Continuous>  dextrose 5%. 1000 milliLiter(s) (100 mL/Hr) IV Continuous <Continuous>  dextrose 50% Injectable 25 Gram(s) IV Push once  dextrose 50% Injectable 12.5 Gram(s) IV Push once  dextrose 50% Injectable 25 Gram(s) IV Push once  escitalopram 10 milliGRAM(s) Oral daily  glucagon  Injectable 1 milliGRAM(s) IntraMuscular once  heparin   Injectable 7500 Unit(s) SubCutaneous every 8 hours  insulin glargine Injectable (LANTUS) 18 Unit(s) SubCutaneous every morning  insulin lispro (ADMELOG) corrective regimen sliding scale   SubCutaneous three times a day before meals  insulin lispro Injectable (ADMELOG) 5 Unit(s) SubCutaneous before lunch  insulin lispro Injectable (ADMELOG) 5 Unit(s) SubCutaneous before dinner  levothyroxine Injectable 100 MICROGram(s) IV Push at bedtime  loratadine 10 milliGRAM(s) Oral daily  LORazepam     Tablet 0.5 milliGRAM(s) Oral at bedtime  losartan 100 milliGRAM(s) Oral daily  rasagiline Tablet 1 milliGRAM(s) Oral daily    MEDICATIONS  (PRN):  haloperidol    Injectable 2 milliGRAM(s) IV Push every 6 hours PRN Agitation      ALLERGIES:  Allergies    Ceclor (Unknown)  iodine containing compounds (Unknown)  shellfish (Unknown)    Intolerances        LABS:                        12.6   18.21 )-----------( 197      ( 22 Dec 2021 02:35 )             39.4     12    140  |  108<H>  |  42<H>  ----------------------------<  188<H>  4.1   |  22  |  1.30    Ca    9.1      22 Dec 2021 06:26  Phos  2.3       Mg     2.40         TPro  6.2  /  Alb  3.7  /  TBili  0.7  /  DBili  x   /  AST  38<H>  /  ALT  6   /  AlkPhos  115        Urinalysis Basic - ( 20 Dec 2021 21:13 )    Color: Light Yellow / Appearance: Clear / S.022 / pH: x  Gluc: x / Ketone: Small  / Bili: Negative / Urobili: <2 mg/dL   Blood: x / Protein: Trace / Nitrite: Negative   Leuk Esterase: Negative / RBC: 2 /HPF / WBC 2 /HPF   Sq Epi: x / Non Sq Epi: 4 /HPF / Bacteria: Negative        RADIOLOGY & ADDITIONAL TESTS: Reviewed.

## 2021-12-23 DIAGNOSIS — E10.10 TYPE 1 DIABETES MELLITUS WITH KETOACIDOSIS WITHOUT COMA: ICD-10-CM

## 2021-12-23 DIAGNOSIS — E78.5 HYPERLIPIDEMIA, UNSPECIFIED: ICD-10-CM

## 2021-12-23 DIAGNOSIS — I10 ESSENTIAL (PRIMARY) HYPERTENSION: ICD-10-CM

## 2021-12-23 DIAGNOSIS — Z29.9 ENCOUNTER FOR PROPHYLACTIC MEASURES, UNSPECIFIED: ICD-10-CM

## 2021-12-23 DIAGNOSIS — E03.9 HYPOTHYROIDISM, UNSPECIFIED: ICD-10-CM

## 2021-12-23 DIAGNOSIS — R65.10 SYSTEMIC INFLAMMATORY RESPONSE SYNDROME (SIRS) OF NON-INFECTIOUS ORIGIN WITHOUT ACUTE ORGAN DYSFUNCTION: ICD-10-CM

## 2021-12-23 DIAGNOSIS — K86.89 OTHER SPECIFIED DISEASES OF PANCREAS: ICD-10-CM

## 2021-12-23 DIAGNOSIS — G20 PARKINSON'S DISEASE: ICD-10-CM

## 2021-12-23 DIAGNOSIS — N17.9 ACUTE KIDNEY FAILURE, UNSPECIFIED: ICD-10-CM

## 2021-12-23 DIAGNOSIS — R06.02 SHORTNESS OF BREATH: ICD-10-CM

## 2021-12-23 DIAGNOSIS — R41.82 ALTERED MENTAL STATUS, UNSPECIFIED: ICD-10-CM

## 2021-12-23 LAB
ALBUMIN SERPL ELPH-MCNC: 3.2 G/DL — LOW (ref 3.3–5)
ALP SERPL-CCNC: 118 U/L — SIGNIFICANT CHANGE UP (ref 40–120)
ALT FLD-CCNC: 27 U/L — SIGNIFICANT CHANGE UP (ref 4–33)
ANION GAP SERPL CALC-SCNC: 9 MMOL/L — SIGNIFICANT CHANGE UP (ref 7–14)
AST SERPL-CCNC: 42 U/L — HIGH (ref 4–32)
BILIRUB SERPL-MCNC: 0.9 MG/DL — SIGNIFICANT CHANGE UP (ref 0.2–1.2)
BUN SERPL-MCNC: 27 MG/DL — HIGH (ref 7–23)
CALCIUM SERPL-MCNC: 9.1 MG/DL — SIGNIFICANT CHANGE UP (ref 8.4–10.5)
CHLORIDE SERPL-SCNC: 106 MMOL/L — SIGNIFICANT CHANGE UP (ref 98–107)
CO2 SERPL-SCNC: 26 MMOL/L — SIGNIFICANT CHANGE UP (ref 22–31)
CREAT SERPL-MCNC: 0.88 MG/DL — SIGNIFICANT CHANGE UP (ref 0.5–1.3)
GLUCOSE BLDC GLUCOMTR-MCNC: 110 MG/DL — HIGH (ref 70–99)
GLUCOSE BLDC GLUCOMTR-MCNC: 122 MG/DL — HIGH (ref 70–99)
GLUCOSE BLDC GLUCOMTR-MCNC: 125 MG/DL — HIGH (ref 70–99)
GLUCOSE BLDC GLUCOMTR-MCNC: 140 MG/DL — HIGH (ref 70–99)
GLUCOSE BLDC GLUCOMTR-MCNC: 215 MG/DL — HIGH (ref 70–99)
GLUCOSE BLDC GLUCOMTR-MCNC: 248 MG/DL — HIGH (ref 70–99)
GLUCOSE BLDC GLUCOMTR-MCNC: 298 MG/DL — HIGH (ref 70–99)
GLUCOSE BLDC GLUCOMTR-MCNC: 309 MG/DL — HIGH (ref 70–99)
GLUCOSE BLDC GLUCOMTR-MCNC: 50 MG/DL — CRITICAL LOW (ref 70–99)
GLUCOSE BLDC GLUCOMTR-MCNC: 51 MG/DL — CRITICAL LOW (ref 70–99)
GLUCOSE BLDC GLUCOMTR-MCNC: 93 MG/DL — SIGNIFICANT CHANGE UP (ref 70–99)
GLUCOSE BLDC GLUCOMTR-MCNC: 96 MG/DL — SIGNIFICANT CHANGE UP (ref 70–99)
GLUCOSE SERPL-MCNC: 356 MG/DL — HIGH (ref 70–99)
HCT VFR BLD CALC: 38.6 % — SIGNIFICANT CHANGE UP (ref 34.5–45)
HGB BLD-MCNC: 12.5 G/DL — SIGNIFICANT CHANGE UP (ref 11.5–15.5)
MAGNESIUM SERPL-MCNC: 2.2 MG/DL — SIGNIFICANT CHANGE UP (ref 1.6–2.6)
MCHC RBC-ENTMCNC: 28.7 PG — SIGNIFICANT CHANGE UP (ref 27–34)
MCHC RBC-ENTMCNC: 32.4 GM/DL — SIGNIFICANT CHANGE UP (ref 32–36)
MCV RBC AUTO: 88.5 FL — SIGNIFICANT CHANGE UP (ref 80–100)
NRBC # BLD: 0 /100 WBCS — SIGNIFICANT CHANGE UP
NRBC # FLD: 0 K/UL — SIGNIFICANT CHANGE UP
PLATELET # BLD AUTO: 165 K/UL — SIGNIFICANT CHANGE UP (ref 150–400)
POTASSIUM SERPL-MCNC: 4.1 MMOL/L — SIGNIFICANT CHANGE UP (ref 3.5–5.3)
POTASSIUM SERPL-SCNC: 4.1 MMOL/L — SIGNIFICANT CHANGE UP (ref 3.5–5.3)
PROT SERPL-MCNC: 5.3 G/DL — LOW (ref 6–8.3)
RBC # BLD: 4.36 M/UL — SIGNIFICANT CHANGE UP (ref 3.8–5.2)
RBC # FLD: 17.2 % — HIGH (ref 10.3–14.5)
SODIUM SERPL-SCNC: 141 MMOL/L — SIGNIFICANT CHANGE UP (ref 135–145)
WBC # BLD: 12.62 K/UL — HIGH (ref 3.8–10.5)
WBC # FLD AUTO: 12.62 K/UL — HIGH (ref 3.8–10.5)

## 2021-12-23 PROCEDURE — 99233 SBSQ HOSP IP/OBS HIGH 50: CPT | Mod: GC

## 2021-12-23 PROCEDURE — 99233 SBSQ HOSP IP/OBS HIGH 50: CPT

## 2021-12-23 RX ORDER — PANTOPRAZOLE SODIUM 20 MG/1
40 TABLET, DELAYED RELEASE ORAL
Refills: 0 | Status: DISCONTINUED | OUTPATIENT
Start: 2021-12-23 | End: 2021-12-31

## 2021-12-23 RX ORDER — LIPASE/PROTEASE/AMYLASE 16-48-48K
2 CAPSULE,DELAYED RELEASE (ENTERIC COATED) ORAL
Refills: 0 | Status: DISCONTINUED | OUTPATIENT
Start: 2021-12-23 | End: 2021-12-27

## 2021-12-23 RX ORDER — INSULIN GLARGINE 100 [IU]/ML
22 INJECTION, SOLUTION SUBCUTANEOUS EVERY MORNING
Refills: 0 | Status: DISCONTINUED | OUTPATIENT
Start: 2021-12-24 | End: 2021-12-25

## 2021-12-23 RX ORDER — ELUXADOLINE 100 MG/1
1 TABLET, FILM COATED ORAL
Qty: 0 | Refills: 0 | DISCHARGE

## 2021-12-23 RX ORDER — NITROFURANTOIN MACROCRYSTAL 50 MG
100 CAPSULE ORAL
Refills: 0 | Status: DISCONTINUED | OUTPATIENT
Start: 2021-12-23 | End: 2021-12-23

## 2021-12-23 RX ORDER — INSULIN GLARGINE 100 [IU]/ML
24 INJECTION, SOLUTION SUBCUTANEOUS
Qty: 0 | Refills: 0 | DISCHARGE

## 2021-12-23 RX ORDER — INSULIN GLULISINE 100 [IU]/ML
0 INJECTION, SOLUTION SUBCUTANEOUS
Qty: 0 | Refills: 0 | DISCHARGE

## 2021-12-23 RX ORDER — INSULIN LISPRO 100/ML
8 VIAL (ML) SUBCUTANEOUS
Refills: 0 | Status: DISCONTINUED | OUTPATIENT
Start: 2021-12-23 | End: 2021-12-25

## 2021-12-23 RX ORDER — INSULIN LISPRO 100/ML
VIAL (ML) SUBCUTANEOUS
Refills: 0 | Status: DISCONTINUED | OUTPATIENT
Start: 2021-12-23 | End: 2021-12-31

## 2021-12-23 RX ORDER — INSULIN LISPRO 100/ML
10 VIAL (ML) SUBCUTANEOUS
Refills: 0 | Status: DISCONTINUED | OUTPATIENT
Start: 2021-12-23 | End: 2021-12-23

## 2021-12-23 RX ORDER — LEVOCETIRIZINE DIHYDROCHLORIDE 0.5 MG/ML
1 SOLUTION ORAL
Qty: 0 | Refills: 0 | DISCHARGE

## 2021-12-23 RX ORDER — ATORVASTATIN CALCIUM 80 MG/1
40 TABLET, FILM COATED ORAL AT BEDTIME
Refills: 0 | Status: DISCONTINUED | OUTPATIENT
Start: 2021-12-23 | End: 2021-12-31

## 2021-12-23 RX ORDER — DEXTROSE 50 % IN WATER 50 %
15 SYRINGE (ML) INTRAVENOUS ONCE
Refills: 0 | Status: COMPLETED | OUTPATIENT
Start: 2021-12-23 | End: 2021-12-23

## 2021-12-23 RX ORDER — INSULIN LISPRO 100/ML
VIAL (ML) SUBCUTANEOUS AT BEDTIME
Refills: 0 | Status: DISCONTINUED | OUTPATIENT
Start: 2021-12-23 | End: 2021-12-31

## 2021-12-23 RX ADMIN — CARBIDOPA AND LEVODOPA 1 TABLET(S): 25; 100 TABLET ORAL at 05:39

## 2021-12-23 RX ADMIN — Medication 15 GRAM(S): at 18:39

## 2021-12-23 RX ADMIN — CHLORHEXIDINE GLUCONATE 1 APPLICATION(S): 213 SOLUTION TOPICAL at 06:59

## 2021-12-23 RX ADMIN — RASAGILINE 1 MILLIGRAM(S): 0.5 TABLET ORAL at 13:05

## 2021-12-23 RX ADMIN — LOSARTAN POTASSIUM 100 MILLIGRAM(S): 100 TABLET, FILM COATED ORAL at 05:40

## 2021-12-23 RX ADMIN — Medication 4: at 13:03

## 2021-12-23 RX ADMIN — HEPARIN SODIUM 7500 UNIT(S): 5000 INJECTION INTRAVENOUS; SUBCUTANEOUS at 05:38

## 2021-12-23 RX ADMIN — Medication 100 MILLIGRAM(S): at 07:30

## 2021-12-23 RX ADMIN — Medication 8: at 09:53

## 2021-12-23 RX ADMIN — Medication 100 MICROGRAM(S): at 22:58

## 2021-12-23 RX ADMIN — LORATADINE 10 MILLIGRAM(S): 10 TABLET ORAL at 13:22

## 2021-12-23 RX ADMIN — Medication 10 UNIT(S): at 13:03

## 2021-12-23 RX ADMIN — Medication 7 UNIT(S): at 09:54

## 2021-12-23 RX ADMIN — Medication 2 CAPSULE(S): at 18:57

## 2021-12-23 RX ADMIN — Medication 0.5 MILLIGRAM(S): at 22:57

## 2021-12-23 RX ADMIN — ATORVASTATIN CALCIUM 40 MILLIGRAM(S): 80 TABLET, FILM COATED ORAL at 22:58

## 2021-12-23 RX ADMIN — HEPARIN SODIUM 7500 UNIT(S): 5000 INJECTION INTRAVENOUS; SUBCUTANEOUS at 22:58

## 2021-12-23 RX ADMIN — INSULIN GLARGINE 18 UNIT(S): 100 INJECTION, SOLUTION SUBCUTANEOUS at 07:46

## 2021-12-23 RX ADMIN — CARBIDOPA AND LEVODOPA 1 TABLET(S): 25; 100 TABLET ORAL at 22:58

## 2021-12-23 RX ADMIN — ESCITALOPRAM OXALATE 10 MILLIGRAM(S): 10 TABLET, FILM COATED ORAL at 13:23

## 2021-12-23 RX ADMIN — Medication 2 CAPSULE(S): at 13:53

## 2021-12-23 RX ADMIN — HEPARIN SODIUM 7500 UNIT(S): 5000 INJECTION INTRAVENOUS; SUBCUTANEOUS at 13:06

## 2021-12-23 RX ADMIN — CARBIDOPA AND LEVODOPA 1 TABLET(S): 25; 100 TABLET ORAL at 13:06

## 2021-12-23 NOTE — PROGRESS NOTE ADULT - PROBLEM SELECTOR PLAN 10
#SIRS   - patient with leukocytosis and tachycardia on admission   - UA negative for infection, CXR clear lungs   - follow up RVP , COVID (neg), and blood cultures   - currently monitoring off antibiotics, higher suspicion for viral illness     PLAN  - urine culture is pending

## 2021-12-23 NOTE — PROGRESS NOTE ADULT - SUBJECTIVE AND OBJECTIVE BOX
Subjective: no hypoglycemia in last 24 hours   tolerating diet     MEDICATIONS  (STANDING):  atorvastatin 40 milliGRAM(s) Oral at bedtime  carbidopa/levodopa  25/100 1 Tablet(s) Oral three times a day  chlorhexidine 4% Liquid 1 Application(s) Topical <User Schedule>  dextrose 40% Gel 15 Gram(s) Oral once  dextrose 5%. 1000 milliLiter(s) (50 mL/Hr) IV Continuous <Continuous>  dextrose 5%. 1000 milliLiter(s) (100 mL/Hr) IV Continuous <Continuous>  dextrose 50% Injectable 25 Gram(s) IV Push once  dextrose 50% Injectable 12.5 Gram(s) IV Push once  dextrose 50% Injectable 25 Gram(s) IV Push once  escitalopram 10 milliGRAM(s) Oral daily  glucagon  Injectable 1 milliGRAM(s) IntraMuscular once  heparin   Injectable 7500 Unit(s) SubCutaneous every 8 hours  insulin glargine Injectable (LANTUS) 22 Unit(s) SubCutaneous every morning  insulin lispro (ADMELOG) corrective regimen sliding scale   SubCutaneous three times a day before meals  insulin lispro Injectable (ADMELOG) 10 Unit(s) SubCutaneous three times a day before meals  levothyroxine Injectable 100 MICROGram(s) IV Push at bedtime  loratadine 10 milliGRAM(s) Oral daily  LORazepam     Tablet 0.5 milliGRAM(s) Oral at bedtime  losartan 100 milliGRAM(s) Oral daily  pancrelipase  (CREON 36,000 Lipase Units) 2 Capsule(s) Oral three times a day with meals  pantoprazole    Tablet 40 milliGRAM(s) Oral before breakfast  rasagiline Tablet 1 milliGRAM(s) Oral daily    MEDICATIONS  (PRN):  haloperidol    Injectable 2 milliGRAM(s) IV Push every 6 hours PRN Agitation      PHYSICAL EXAM:  VITALS: T(C): 36.9 (12-23-21 @ 10:00)  T(F): 98.4 (12-23-21 @ 10:00), Max: 98.5 (12-22-21 @ 16:00)  HR: 94 (12-23-21 @ 10:00) (78 - 94)  BP: 154/86 (12-23-21 @ 10:00) (105/71 - 154/86)  RR:  (17 - 20)  SpO2:  (92% - 99%)  Wt(kg): --  GENERAL: NAD, well-groomed, well-developed  EYES: No proptosis, no injection  HEENT:  Atraumatic, Normocephalic, moist mucous membranes  THYROID: Normal size, no palpable nodules  RESPIRATORY: Clear to auscultation bilaterally; No rales, rhonchi, wheezing, or rubs  CARDIOVASCULAR: Regular rate and rhythm; No murmurs; no peripheral edema  GI: Soft, nontender, non distended, normal bowel sounds  CUSHING'S SIGNS: no striae    POCT Blood Glucose.: 215 mg/dL (12-23-21 @ 12:31)  POCT Blood Glucose.: 309 mg/dL (12-23-21 @ 09:45)  POCT Blood Glucose.: 298 mg/dL (12-23-21 @ 07:28)  POCT Blood Glucose.: 140 mg/dL (12-23-21 @ 02:44)  POCT Blood Glucose.: 99 mg/dL (12-22-21 @ 23:03)  POCT Blood Glucose.: 100 mg/dL (12-22-21 @ 22:10)  POCT Blood Glucose.: 92 mg/dL (12-22-21 @ 20:59)  POCT Blood Glucose.: 127 mg/dL (12-22-21 @ 20:05)  POCT Blood Glucose.: 199 mg/dL (12-22-21 @ 18:49)  POCT Blood Glucose.: 307 mg/dL (12-22-21 @ 16:45)  POCT Blood Glucose.: 336 mg/dL (12-22-21 @ 15:53)  POCT Blood Glucose.: 290 mg/dL (12-22-21 @ 14:45)  POCT Blood Glucose.: 135 mg/dL (12-22-21 @ 12:58)  POCT Blood Glucose.: 90 mg/dL (12-22-21 @ 12:31)  POCT Blood Glucose.: 99 mg/dL (12-22-21 @ 12:09)  POCT Blood Glucose.: 135 mg/dL (12-22-21 @ 10:06)  POCT Blood Glucose.: 138 mg/dL (12-22-21 @ 09:08)  POCT Blood Glucose.: 148 mg/dL (12-22-21 @ 08:15)  POCT Blood Glucose.: 178 mg/dL (12-22-21 @ 07:03)  POCT Blood Glucose.: 193 mg/dL (12-22-21 @ 06:10)  POCT Blood Glucose.: 192 mg/dL (12-22-21 @ 05:10)  POCT Blood Glucose.: 176 mg/dL (12-22-21 @ 04:18)  POCT Blood Glucose.: 184 mg/dL (12-22-21 @ 03:03)  POCT Blood Glucose.: 184 mg/dL (12-22-21 @ 02:04)  POCT Blood Glucose.: 204 mg/dL (12-22-21 @ 01:02)  POCT Blood Glucose.: 186 mg/dL (12-22-21 @ 00:13)  POCT Blood Glucose.: 227 mg/dL (12-21-21 @ 23:21)  POCT Blood Glucose.: 190 mg/dL (12-21-21 @ 22:04)  POCT Blood Glucose.: 162 mg/dL (12-21-21 @ 21:01)  POCT Blood Glucose.: 148 mg/dL (12-21-21 @ 20:03)  POCT Blood Glucose.: 139 mg/dL (12-21-21 @ 19:09)  POCT Blood Glucose.: 122 mg/dL (12-21-21 @ 18:20)  POCT Blood Glucose.: 136 mg/dL (12-21-21 @ 17:10)  POCT Blood Glucose.: 171 mg/dL (12-21-21 @ 16:12)  POCT Blood Glucose.: 227 mg/dL (12-21-21 @ 15:09)  POCT Blood Glucose.: 239 mg/dL (12-21-21 @ 14:19)  POCT Blood Glucose.: 297 mg/dL (12-21-21 @ 13:18)  POCT Blood Glucose.: 244 mg/dL (12-21-21 @ 12:07)  POCT Blood Glucose.: 237 mg/dL (12-21-21 @ 11:06)  POCT Blood Glucose.: 210 mg/dL (12-21-21 @ 10:13)  POCT Blood Glucose.: 198 mg/dL (12-21-21 @ 09:00)  POCT Blood Glucose.: 156 mg/dL (12-21-21 @ 08:02)  POCT Blood Glucose.: 133 mg/dL (12-21-21 @ 07:01)  POCT Blood Glucose.: 155 mg/dL (12-21-21 @ 06:04)  POCT Blood Glucose.: 184 mg/dL (12-21-21 @ 05:06)  POCT Blood Glucose.: 269 mg/dL (12-21-21 @ 04:02)  POCT Blood Glucose.: 354 mg/dL (12-21-21 @ 03:01)  POCT Blood Glucose.: 404 mg/dL (12-21-21 @ 02:15)  POCT Blood Glucose.: 476 mg/dL (12-21-21 @ 01:02)  POCT Blood Glucose.: 549 mg/dL (12-21-21 @ 00:11)  POCT Blood Glucose.: >600 mg/dL (12-20-21 @ 23:04)  POCT Blood Glucose.: >600 mg/dL (12-20-21 @ 22:05)  POCT Blood Glucose.: >600 mg/dL (12-20-21 @ 21:09)  POCT Blood Glucose.: >600 mg/dL (12-20-21 @ 19:29)  POCT Blood Glucose.: >600 mg/dL (12-20-21 @ 17:50)    12-23    141  |  106  |  27<H>  ----------------------------<  356<H>  4.1   |  26  |  0.88    EGFR if : 76  EGFR if non : 65    Ca    9.1      12-23  Mg     2.20     12-23  Phos  2.5     12-22    TPro  5.3<L>  /  Alb  3.2<L>  /  TBili  0.9  /  DBili  x   /  AST  42<H>  /  ALT  27  /  AlkPhos  118  12-23      Thyroid Function Tests:  12-20 @ 23:52 TSH 0.49 FreeT4 -- T3 -- Anti TPO -- Anti Thyroglobulin Ab -- TSI --

## 2021-12-23 NOTE — PROGRESS NOTE ADULT - PROBLEM SELECTOR PLAN 4
#Acute metabolic encephalopathy   - patient agitated and confused on admission (normally A+Ox3, on admission A+Ox0) - currently AOx3  - likely secondary to DKA   - infectious workup thus far has been negative, urine cultures are pending; will replete electrolytes as needed   - was receiving IV Haldol PRN in the ICU    PLAN  - AAOx3  - d/c Haldol

## 2021-12-23 NOTE — PROGRESS NOTE ADULT - PROBLEM SELECTOR PLAN 1
#Diabetes with DKA  - Patient with hx of DM1, non-complaint with basal bolus at home, A1C 9.4% on admission  - on admission glucose 1029, pH 7.13, bicarb 8, AG 37, BHB 9, K 6.0   - patient received insulin drip at 9units per hour while in MICU, adjusted per DKA protocol  - s/p 2L of fluids, now on D5 until patient eats  - PO diet was started, patient received 18 of Lantus in the AM  - sugars elevated in the AM, but improving throughout the day    PLAN  - ctm sugars with meals and bedtime - goal 140 - 180  - endocrinology recommendations appreciated  - Lantus 22 sq for bedtime basal insulin  - Admelog 10 TID before meals   - moderate dose sliding scale for meals, LDCS for basal  - DISCHARGE: will not d/c on a pump.  pt is willing to be discharged on insulin pens.  She knows how to inject - dose TBD. #Diabetes with DKA  - Patient with hx of DM1, non-complaint with basal bolus at home, A1C 9.4% on admission  - on admission glucose 1029, pH 7.13, bicarb 8, AG 37, BHB 9, K 6.0   - patient received insulin drip at 9units per hour while in MICU, adjusted per DKA protocol  - s/p 2L of fluids, now on D5 until patient eats  - PO diet was started, patient received 18 of Lantus in the AM  - sugars elevated in the AM, but improving throughout the day    PLAN  - ctm sugars with meals and bedtime - goal 140 - 180  - endocrinology recommendations appreciated  - Lantus 22 sq for bedtime basal insulin  - Admelog 10 TID before meals - update: patient hypoglycaemic prior to dinner, reduced to 8TID   - moderate dose sliding scale for meals, LDCS for basal  - DISCHARGE: will not d/c on a pump.  pt is willing to be discharged on insulin pens.  She knows how to inject - dose TBD.

## 2021-12-23 NOTE — PROGRESS NOTE ADULT - PROBLEM SELECTOR PLAN 2
#HTN   - blood pressure currently well controlled   - On losartan 100 mg at home    PLAN  - c/w Losartan 100mg qdaily

## 2021-12-23 NOTE — PROGRESS NOTE ADULT - PROBLEM SELECTOR PLAN 8
#AZALEA   - patient with BUN 63, Cr 2.14 on admission (baseline creatinine 0.8),   - likely pre-renal in the setting of DKA   - will treat DKA and continue to trend BUN and creatinine  #Anion gap metabolic acidosis   - on admission pH 7.13, bicarb 8, AG 37   - likely multifactorial in origin with DKA, uremia and lactate contributing   - will continue to trend lactate and treat DKA and pre-renal AZALEA  - gap is 13 --> 10 now   #hypophosphatemia  - replete 12/22    PLAN  - Cr at 0.88 today, back to baseline  - gap has closed to 9  - electrolytes wnl  - trend BMP and replete as required

## 2021-12-23 NOTE — PROGRESS NOTE ADULT - SUBJECTIVE AND OBJECTIVE BOX
Dale Valencia MD  PGY-1 Internal Medicine  Pager:  [phone] (ns) [phone] ([facility])  Available on [employer] Teams  21 @ 07:19  _________________________________________________________________________________________________________________________________________    CC:      Patient is a 71y old  Female who presents with a chief complaint of Hyperglycemia (22 Dec 2021 12:26)        SUBJECTIVE:    NAEO.      _________________________________________________________________________________________________________________________________________    OBJECTIVE:    Vital Signs Last 24 Hrs  T(C): 36.7 (23 Dec 2021 05:53), Max: 36.9 (22 Dec 2021 16:00)  T(F): 98 (23 Dec 2021 05:53), Max: 98.5 (22 Dec 2021 16:00)  HR: 85 (23 Dec 2021 05:53) (78 - 94)  BP: 139/82 (23 Dec 2021 05:53) (105/71 - 153/66)  BP(mean): 67 (23 Dec 2021 02:00) (67 - 92)  RR: 18 (23 Dec 2021 05:53) (18 - 20)  SpO2: 96% (23 Dec 2021 05:53) (89% - 99%)    I&O's Summary    22 Dec 2021 07:01  -  23 Dec 2021 07:00  --------------------------------------------------------  IN: 502.1 mL / OUT: 670 mL / NET: -167.9 mL        MEDICATIONS  (STANDING):  carbidopa/levodopa  25/100 1 Tablet(s) Oral three times a day  chlorhexidine 4% Liquid 1 Application(s) Topical <User Schedule>  dextrose 40% Gel 15 Gram(s) Oral once  dextrose 5%. 1000 milliLiter(s) (100 mL/Hr) IV Continuous <Continuous>  dextrose 5%. 1000 milliLiter(s) (50 mL/Hr) IV Continuous <Continuous>  dextrose 50% Injectable 25 Gram(s) IV Push once  dextrose 50% Injectable 12.5 Gram(s) IV Push once  dextrose 50% Injectable 25 Gram(s) IV Push once  escitalopram 10 milliGRAM(s) Oral daily  glucagon  Injectable 1 milliGRAM(s) IntraMuscular once  heparin   Injectable 7500 Unit(s) SubCutaneous every 8 hours  insulin glargine Injectable (LANTUS) 18 Unit(s) SubCutaneous every morning  insulin lispro (ADMELOG) corrective regimen sliding scale   SubCutaneous three times a day before meals  insulin lispro Injectable (ADMELOG) 7 Unit(s) SubCutaneous before lunch  insulin lispro Injectable (ADMELOG) 7 Unit(s) SubCutaneous before dinner  insulin lispro Injectable (ADMELOG) 7 Unit(s) SubCutaneous before breakfast  levothyroxine Injectable 100 MICROGram(s) IV Push at bedtime  loratadine 10 milliGRAM(s) Oral daily  LORazepam     Tablet 0.5 milliGRAM(s) Oral at bedtime  losartan 100 milliGRAM(s) Oral daily  nitrofurantoin monohydrate/macrocrystals (MACROBID) 100 milliGRAM(s) Oral two times a day  rasagiline Tablet 1 milliGRAM(s) Oral daily    MEDICATIONS  (PRN):  haloperidol    Injectable 2 milliGRAM(s) IV Push every 6 hours PRN Agitation      REVIEW OF SYSTEMS:    CONSTITUTIONAL:  Denies weight loss, fever, chills, weakness or fatigue, headache.  EYES:  Denies vision loss, blurred vision, double vision or yellow sclerae.   ENT:  Denies hearing loss, sneezing, congestion, runny nose or sore throat.  CARDIOVASCULAR:  Denies chest pain, chest pressure or chest discomfort. Denies palpitations, or tachycardia.  RESPIRATORY:  Denies shortness of breath, cough or sputum.  GASTROINTESTINAL:  Denies anorexia, nausea, vomiting, constipation, or diarrhea. Denies abdominal pain or blood.  NEUROLOGICAL:  Denies headache, dizziness, syncope, numbness or tingling in the extremities.  PSYCHIATRIC:  Denies history of depression or anxiety.      PHYSICAL EXAM:    GENERAL: Laying comfortably, NAD  EYES: EOMI, PERRL, no scleral icterus  NECK: No JVD  LUNG: Clear to auscultation bilaterally; No wheeze, crackles or rhonci  HEART: Regular rate and rhythm; No murmurs, rubs, or gallops  ABDOMEN: Soft, Nontender, Nondistended  EXTREMITIES:  No LE edema, 2+ Peripheral Pulses, No clubbing, cyanosis, or edema  PSYCH: AAOx3  NEUROLOGY: non-focal, strength 5/5 in all extremities, sensation intact  SKIN: No rashes or lesions      LABS:                            12.6   18.21 )-----------( 197      ( 22 Dec 2021 02:35 )             39.4     Auto Eosinophil # 0.01  / Auto Eosinophil % 0.1   / Auto Neutrophil # 15.68 / Auto Neutrophil % 86.1  / BANDS % x            141  |  108<H>  |  34<H>  ----------------------------<  319<H>  5.2   |  20<L>  |  1.02      140  |  108<H>  |  42<H>  ----------------------------<  188<H>  4.1   |  22  |  1.30      142  |  108<H>  |  48<H>  ----------------------------<  216<H>  4.4   |  21<L>  |  1.51<H>    Ca    8.9      22 Dec 2021 17:01  Mg     2.30     -  Phos  2.5     -          Urinalysis Basic - ( 22 Dec 2021 23:29 )    Color: Yellow / Appearance: Slightly Turbid / S.021 / pH: x  Gluc: x / Ketone: Trace  / Bili: Negative / Urobili: <2 mg/dL   Blood: x / Protein: 30 mg/dL / Nitrite: Negative   Leuk Esterase: Large / RBC: 24 /HPF / WBC 82 /HPF   Sq Epi: x / Non Sq Epi: 2 /HPF / Bacteria: Negative        RADIOLOGY & ADDITIONAL TESTS:    Imaging Personally Reviewed:    Consultant(s) Notes Reviewed:      Care Discussed with Consultants/Other Providers:      _________________________________________________________________________________________________________________________________________  Dale Valencia MD  PGY-1 Internal Medicine  Pager: [phone] (NS) [phone] ([facility])  Available on Microsoft Teams  23 Dec 2021 07:19         Dale Valencia MD  PGY-1 Internal Medicine  Pager:  [phone] (ns) [phone] ([facility])  Available on [employer] Teams  21 @ 07:19  _________________________________________________________________________________________________________________________________________    CC:      Patient is a 71y old  Female who presents with a chief complaint of Hyperglycemia (22 Dec 2021 12:26)        SUBJECTIVE:    NAEO.    Patient feeling improved today compared to yesterday. Expresses that she is allergic to fish and wants this adjusted on her diet. States that she is feeling lethargic, but she had issues with administering her insulin at home, as she has Parkinsons and arthritis and cannot easily administer the pump at home. She had missed several days of insulin according to herself and her son, hence why she came in with DKA (based on her assumption). Patient also endorses more frequent urination.     Denies f/c, no n/v/d/c, no SOB, no chest pain, no abdominal pain, mild lethargy, no edema, increased urination, no burning on urination.   _________________________________________________________________________________________________________________________________________    OBJECTIVE:    Vital Signs Last 24 Hrs  T(C): 36.7 (23 Dec 2021 05:53), Max: 36.9 (22 Dec 2021 16:00)  T(F): 98 (23 Dec 2021 05:53), Max: 98.5 (22 Dec 2021 16:00)  HR: 85 (23 Dec 2021 05:53) (78 - 94)  BP: 139/82 (23 Dec 2021 05:53) (105/71 - 153/66)  BP(mean): 67 (23 Dec 2021 02:00) (67 - 92)  RR: 18 (23 Dec 2021 05:53) (18 - 20)  SpO2: 96% (23 Dec 2021 05:53) (89% - 99%)    I&O's Summary    22 Dec 2021 07:01  -  23 Dec 2021 07:00  --------------------------------------------------------  IN: 502.1 mL / OUT: 670 mL / NET: -167.9 mL        MEDICATIONS  (STANDING):  carbidopa/levodopa  25/100 1 Tablet(s) Oral three times a day  chlorhexidine 4% Liquid 1 Application(s) Topical <User Schedule>  dextrose 40% Gel 15 Gram(s) Oral once  dextrose 5%. 1000 milliLiter(s) (100 mL/Hr) IV Continuous <Continuous>  dextrose 5%. 1000 milliLiter(s) (50 mL/Hr) IV Continuous <Continuous>  dextrose 50% Injectable 25 Gram(s) IV Push once  dextrose 50% Injectable 12.5 Gram(s) IV Push once  dextrose 50% Injectable 25 Gram(s) IV Push once  escitalopram 10 milliGRAM(s) Oral daily  glucagon  Injectable 1 milliGRAM(s) IntraMuscular once  heparin   Injectable 7500 Unit(s) SubCutaneous every 8 hours  insulin glargine Injectable (LANTUS) 18 Unit(s) SubCutaneous every morning  insulin lispro (ADMELOG) corrective regimen sliding scale   SubCutaneous three times a day before meals  insulin lispro Injectable (ADMELOG) 7 Unit(s) SubCutaneous before lunch  insulin lispro Injectable (ADMELOG) 7 Unit(s) SubCutaneous before dinner  insulin lispro Injectable (ADMELOG) 7 Unit(s) SubCutaneous before breakfast  levothyroxine Injectable 100 MICROGram(s) IV Push at bedtime  loratadine 10 milliGRAM(s) Oral daily  LORazepam     Tablet 0.5 milliGRAM(s) Oral at bedtime  losartan 100 milliGRAM(s) Oral daily  nitrofurantoin monohydrate/macrocrystals (MACROBID) 100 milliGRAM(s) Oral two times a day  rasagiline Tablet 1 milliGRAM(s) Oral daily    MEDICATIONS  (PRN):  haloperidol    Injectable 2 milliGRAM(s) IV Push every 6 hours PRN Agitation      PHYSICAL EXAM:    GENERAL: Laying comfortably, NAD  EYES: EOMI, PERRL, no scleral icterus  NECK: No JVD  LUNG: Clear to auscultation bilaterally; No wheeze, crackles or rhonci  HEART: Regular rate and rhythm; No murmurs, rubs, or gallops  ABDOMEN: Soft, Nontender, Nondistended  EXTREMITIES:  No LE edema, 2+ Peripheral Pulses, No clubbing, cyanosis, or edema  PSYCH: AAOx3  NEUROLOGY: non-focal, strength 5/5 in all extremities, sensation intact  SKIN: No rashes or lesions      Labs:                        12.5   12.62 )-----------( 165      ( 23 Dec 2021 11:52 )             38.6     Auto Eosinophil # x     / Auto Eosinophil % x     / Auto Neutrophil # x     / Auto Neutrophil % x     / BANDS % x                            12.6   18.21 )-----------( 197      ( 22 Dec 2021 02:35 )             39.4     Auto Eosinophil # 0.01  / Auto Eosinophil % 0.1   / Auto Neutrophil # 15.68 / Auto Neutrophil % 86.1  / BANDS % x        Hgb Trend: 12.5<--, 12.6<--, 11.2<--, 11.7<--    12    141  |  106  |  27<H>  ----------------------------<  356<H>  4.1   |  26  |  0.88      141  |  108<H>  |  34<H>  ----------------------------<  319<H>  5.2   |  20<L>  |  1.02      140  |  108<H>  |  42<H>  ----------------------------<  188<H>  4.1   |  22  |  1.30    Ca    9.1      23 Dec 2021 11:40  Mg     2.20       Phos  2.5       TPro  5.3<L>  /  Alb  3.2<L>  /  TBili  0.9  /  DBili  x   /  AST  42<H>  /  ALT  27  /  AlkPhos  118      Creatinine Trend: 0.88<--, 1.02<--, 1.30<--, 1.51<--, 1.76<--, 2.12<--        Urinalysis Basic - ( 22 Dec 2021 23:29 )    Color: Yellow / Appearance: Slightly Turbid / S.021 / pH: x  Gluc: x / Ketone: Trace  / Bili: Negative / Urobili: <2 mg/dL   Blood: x / Protein: 30 mg/dL / Nitrite: Negative   Leuk Esterase: Large / RBC: 24 /HPF / WBC 82 /HPF   Sq Epi: x / Non Sq Epi: 2 /HPF / Bacteria: Negative    Labs result reviewed by me.  21 @ 16:32      RADIOLOGY & ADDITIONAL TESTS:    < from: Xray Chest 1 View- PORTABLE-Urgent (Xray Chest 1 View- PORTABLE-Urgent .) (21 @ 21:49) >  The lungs are clear. No pleural effusion.    Heart size is without change.    Partially imaged right shoulder arthroplasty.    Degenerative changes of the spine.      IMPRESSION:    Clear lungs.    < end of copied text >        _________________________________________________________________________________________________________________________________________  Dale Valencia MD  PGY-1 Internal Medicine  Pager: [phone] (NS) [phone] ([facility])  Available on [employer] Teams  23 Dec 2021 07:19

## 2021-12-23 NOTE — PHYSICAL THERAPY INITIAL EVALUATION ADULT - PERTINENT HX OF CURRENT PROBLEM, REHAB EVAL
Pt. admitted for SOB, nausea, vomiting, and AMS. Pt. was admitted to MICU for DKA, now transferred to floor.

## 2021-12-23 NOTE — PROGRESS NOTE ADULT - PROBLEM SELECTOR PLAN 9
#Hypothyroidism   - patient with thyroid cancer s/p resection in 2000 now with hypothyroidism   - follows with Dr. Ennis outpatient, currently on tirosint levothyroxine sodium) 125mcg daily   - 100 mg IV levothyroxine equivalent   - TSH 10/2021 1.00,  TSH here WNL    PLAN  - c/w IV levothyroxine 100

## 2021-12-23 NOTE — PHYSICAL THERAPY INITIAL EVALUATION ADULT - PATIENT PROFILE REVIEW, REHAB EVAL
Activity - Increase as tolerated. Spoke with Reid HOLLIS RN, pt. ok to be seen for PT Evaluation./yes

## 2021-12-23 NOTE — PROGRESS NOTE ADULT - ATTENDING COMMENTS
71F hx of T1DM (since age 38), Parkinsons (diagnosed 2017), bladder incontinence, anxiety/depression, HTN, HLD, thyroid cancer s/p resection in 2000 now with hypothyroidism presents to the ED d/t AMS likely 2/2 DKA and AZALEA. Pt reports poor compliance with insulin due to difficulty administering doses. Otherwise she reports some urinary frequency.   #AMS: resolved  #DKA: resolved. Likely in the setting of med noncompliance. No evidence of infection. FS not within goal range, basal/bolus up titrated. Appreciate endo recs  #Leukocytosis: UA with +LE and +WBC, neg for bacteria. White count has down-trended off abx. Lower suspicion for cystitis/pyelo and favor watching off abx for now pending urine cx.  #AZALEA: resolved   PT: rehab  Rest of care as above

## 2021-12-23 NOTE — PHYSICAL THERAPY INITIAL EVALUATION ADULT - ADDITIONAL COMMENTS
Pt. reports owning a straight cane.     Pt. was left in bed post PT Evaluation, no apparent distress, call perez within reach. Aaron DUMONT made aware of pt. participation in PT.

## 2021-12-23 NOTE — PHYSICAL THERAPY INITIAL EVALUATION ADULT - GAIT DEVIATIONS NOTED, PT EVAL
decreased lalita/increased time in double stance/decreased step length/decreased stride length/decreased weight-shifting ability

## 2021-12-23 NOTE — PROGRESS NOTE ADULT - PROBLEM SELECTOR PLAN 3
Patient has Creon on med rec with meals and snacks, likely in the setting of pancreatic insufficiency due to prolonged diabetes.    PLAN  - c/w Creon 80033 units 2 capsules w/ breakfast, lunch, dinner  - c/w Creon 61820 units 1 capsule w/ snacks

## 2021-12-23 NOTE — PROGRESS NOTE ADULT - ASSESSMENT
71F hx of T1DM (since age 38), Parkinsons (diagnosed 2017), bladder incontinence, anxiety/depression, HTN, HLD, thyroid cancer s/p resection in 2000 now with hypothyroidism presents to the ED d/t SOB, nausea, vomiting and AMS for one day. Patient found to be in DKA and admitted to the MICU for further management.     ==============NEURO=============  #Acute metabolic encephalopathy   - patient agitated and confused on admission (normally A+Ox3, on admission A+Ox0) - currently AOx3  - likely secondary to DKA   - infectious workup as below, will replete electrolytes as needed   - currently on haldol IV q6 for agitation     #Parkinsons  - patient diagnosed with Parkinson's in 2017, follows with Dr. Zelaya outpatient   - Creon 62869 units, 2 capsules 3x/day  - receiving home meds    =========CARDIOVASCULAR=========  #HTN   - blood pressure currently well controlled   - On losartan 100 mg at home    ============RESPIRATORY==========  #Shortness of breath   - Patient's son reports she was experiencing SOB in the day prior to admission   - CXR clear, RVP and covid negative   - blood cultures pending  - patient currently saturating well on room air     ============RENAL==============  #AZALEA   - patient with BUN 63, Cr 2.14 on admission (baseline creatinine 0.8), 1.3 now  - likely pre-renal in the setting of DKA   - will treat DKA and continue to trend BUN and creatinine    #Anion gap metabolic acidosis   - on admission pH 7.13, bicarb 8, AG 37   - likely multifactorial in origin with DKA, uremia and lactate contributing   - will continue to trend lactate and treat DKA and pre-renal AZALEA  - gap is 13 --> 10 now       #hypophosphatemia  - replete 12/22  ============GI===============  #Nausea and vomiting   - patient with n/v on admission, likely secondary to DKA   - will treat nausea as needed and continue to monitor     - Diet: will feed soft diet    ============ENDO============  #Diabetes with DKA  - Patient with hx of DM1, non-complaint with basal bolus at home, A1C 9.4% on admission  - on admission glucose 1029, pH 7.13, bicarb 8, AG 37, BHB 9, K 6.0   - patient currently on insulin drip at 9units per hour, will adjust per DKA protocol   - s/p 2L of fluids, now on D5 until patient eats  - BMP q4, VBG q4 and finger sticks q1   - will feed now that patient's gap is closed  - Lantus 18U as per endo recs  - Off drip: lispro 5U TID + mod dose SS (day) + low dose SS (night), carb consistent diet     #Hypothyroidism   - patient with thyroid cancer s/p resection in 2000 now with hypothyroidism   - follows with Dr. Ennis outpatient, currently on tirosint levothyroxine sodium) 125mcg daily   - 100 mg IV levothyroxine equivalent   - TSH 10/2021 1.00,  TSH here WNL    ===========HEME/ONC===========  - Trend H/H  - A/C: subcutaneous heparin     =============ID=============  #SIRS   - patient with leukocytosis and tachycardia on admission   - UA negative for infection, CXR clear lungs   - follow up RVP , COVID (neg), and blood cultures   - currently monitoring off antibiotics, higher suspicion for viral illness     ===========Lines/Tubes/Salas===========  - peripherals in tact  - GOC: full code                71F hx of T1DM (since age 38), Parkinsons (diagnosed 2017), bladder incontinence, anxiety/depression, HTN, HLD, thyroid cancer s/p resection in 2000 now with hypothyroidism presents to the ED d/t SOB, nausea, vomiting and AMS for one day. Patient found to be in DKA and admitted to the MICU for further management. Now improved and step down to the floor.

## 2021-12-23 NOTE — CHART NOTE - NSCHARTNOTEFT_GEN_A_CORE
MAR Accept Note  Transfer to:  Medicine  Accepting Attending Physician:  Mona Patton  Assigned Room:  969B    Labs and data reviewed.   No findings precluding transfer of service.       HPI/MICU COURSE:   Please refer to MICU transfer note for full details. Briefly, this is a 71F hx of T1DM (since age 38), Parkinsons (diagnosed 2017), bladder incontinence, anxiety/depression, HTN, HLD, thyroid cancer s/p resection in 2000 now with hypothyroidism presents to the ED d/t SOB, nausea, vomiting and AMS that started Monday night. Found to have DKA. Given haldol 2 for agitation, insulin drip at 9units/hour, and 3L fluids. Patient admitted to MICU for DKA possibly in setting of infection versus incorrect medication compliance. Patient started on insulin drip and aggressive fluid repletion. Patient infectious work up returned negative. Endocrine following and patient eventual gap closed and transitioned to basal bolus with resumption of diet. Patient ready to be transferred to floor. Repeat UA after resolution of DKA came back positive. Pending UCx.         FOR FOLLOW-UP:  [ ] obtain UCx  [ ] on empiric abx  [ ] endo recs  [ ] trend FSGs and BMPs     MAR [id number] MAR Accept Note  Transfer to:  Medicine  Accepting Attending Physician:  Mona Patton  Assigned Room:  969B    Labs and data reviewed.   No findings precluding transfer of service.       HPI/MICU COURSE:   Please refer to MICU transfer note for full details. Briefly, this is a 71F hx of T1DM (since age 38), Parkinsons (diagnosed 2017), bladder incontinence, anxiety/depression, HTN, HLD, thyroid cancer s/p resection in 2000 now with hypothyroidism presents to the ED d/t SOB, nausea, vomiting and AMS that started Monday night. Found to have DKA. Given haldol 2 for agitation, insulin drip at 9units/hour, and 3L fluids. Patient admitted to MICU for DKA possibly in setting of infection versus incorrect medication compliance. Patient started on insulin drip and aggressive fluid repletion. Patient infectious work up returned negative. Endocrine following and patient eventual gap closed and transitioned to basal bolus with resumption of diet. Patient ready to be transferred to floor. Repeat UA after resolution of DKA came back positive. Pending UCx.         FOR FOLLOW-UP:  [ ] obtain UCx and start macrobid  [ ] endo recs  [ ] trend FSGs and BMPs     MAR [id number]

## 2021-12-23 NOTE — PROGRESS NOTE ADULT - PROBLEM SELECTOR PROBLEM 8
OT ACUTE Treatment Session    Pt seen on 3CD nursing unit. Frequency Comments: M-F     Admitting complaint[de-identified] SBO                                                                                                ASSESSMENT:  Treatment today focused on grooming tasks, dressing, functional transfers and functional mobility with ww. Current overall ADL status is supervision/set up-total assist  Current functional mobility for ADL and Instrumental-ADL tasks is total assist with chair follow  Patient  displays  fair progress demonstrated by performing grooming tasks with supervision and functional transfers with min assist.    Limitations at this time include weakness, fatigue, cognitive deficits, balance and medical status. Patient will benefit from further skilled OT for continued training with ADLs to help the patient meet goal of maximum independence and safety. Discussed patient goal of returning home, discharge planning options and therapy progress made to date with Patient. RECOMMENDATIONS FOR DISCHARGE:  Recommendations for Discharge: OT: Sub-acute nursing home (03/06/19 1155)    OT Identified Barriers to Discharge: weakness, balance, medical acuity, decrease activity tolerance        PT/OT ADL Equipment for Discharge: Will continue to assess (03/06/19 1155)    Assistance needed when returning home:   Not discussed at this time due to discharge plan/needs not established. Will continue to address as hospital stay progresses. ICU Mobility Assesment (PERME):         PLAN: Continue skilled OT, including the following Treatment Interventions: ADL retraining;Functional transfer training;Patient/Family training;Equipment eval/education;Continued evaluation (03/05/19 1210)   Treatment Plan for Next Session: self cares/sponge bath seated at sink, dressing with AE prn          EDUCATION:   On this date, the patient was educated on role of OT and POC. "  The response to education was: Needs reinforcement                                                    SUBJECTIVE: Patient's Personal Goal: to go home. (03/06/19 1155)   Subjective: Pt. agreeable to session. Pt. states, ""I can't believe how weak I am.\"" (03/06/19 1155)  Subjective/Objective Comments: Olivia SLADE, aware of session. Pt. seated in recliner with call light at end of session. (03/06/19 1155)    OBJECTIVE:Basic Lines: NG;Power catheter;Telemetry;Continuous pulse oximetry;O2 (03/06/19 1155)  Safety Measures: Alarms (03/06/19 1155)    RN reported [facility] Sitter Fall Scale Score: 85       Last 24 hours of Functional Data     ADLs  Self Cares/ADL's  Grooming Assistance: Supervision;Set-up; Chair (03/06/19 1155)  Grooming/Oral Hygiene Deficit: Wash/dry face; Wash/dry hands;Brushing hair (03/06/19 1155)  Lower Body Clothing Assistance: Total Assist - Non-dependent (03/06/19 1155)  Lower Body Dressing Deficit: Don/doff R sock; Don/doff L sock(weakness, decrease trunk flexion) (03/06/19 1155)  Self Cares/ADL's Comments #1: Pt. defers further self cares. (03/06/19 1155)    Household mobility  Household Mobility  Supine to Sit: Minimal Assist (Min)(for trunk due to weakness, head of bed elevated, bed rails) (03/06/19 1155)  Sit to Stand: Minimal Assist (Min); Maximal Assist (Max) (03/06/19 1155)  Stand to Sit: Minimal Assist (Min)(eccentric control, weakness, cues for hand placement) (03/06/19 1155)  Sitting - Static: Supervision(for safety) (03/06/19 1155)  Sitting - Dynamic: Supervision(for safety) (03/06/19 1155)  Standing - Static: Minimal Assist (Min)(for balance, weakness with ww) (03/06/19 1155)  Standing - Dynamic: Minimal Assist (Min)(balance, weakness with ww) (03/06/19 1155)  Household Mobility Comments #1: Pt. initially able to perform sit to stand from edge of bed with min assist for anterior weight shift, weakness and cues for hand placement.  Pt. able to perform functional mobility 15 feet with total assist " secondary to chair follow and min assist x1 for balance, weakness with ww. Pt. attempting to perform second sit to stand from recliner with max assist for anterior weight shift, weakness, and fatigue; however pt. falling backwards into chair upon attempt due to fatigue. (03/06/19 1155)    Home Management       Other Interventions         Tolerance  OT Activity Tolerance  Activity Tolerance: 1:2 Activity to rest (03/06/19 1155)  Activity Tolerance Comments: limited/fair on 1L O2 (03/06/19 1155)    Cognition       Patient's Personal Goal: to go home. (03/06/19 1155)    Therapy Goals:    Goals  Short Term Goals to Be Reviewed On: 03/11/19 (03/04/19 1003)  Short Term Goals Are The Same as Discharge Goals: Yes (03/04/19 1003)  Goal Agreement: Patient agrees with goals and treatment plan (03/04/19 1003)  Grooming Discharge Goal 1: Mod I (03/04/19 1003)  Grooming Discharge Goal Progress 1: Outcome not met, continue to monitor (03/04/19 1003)  Bathing Discharge Goal 1: Mod I (03/04/19 1003)  Bathing Discharge Goal Progress 1: Outcome not met, continue to monitor (03/04/19 1003)  Dressing Discharge Goal 1: Mod I (03/04/19 1003)  Dressing Discharge Goal Progress 1: Outcome not met, continue to monitor (03/04/19 1003)  Toileting Discharge Goal 1: Mod I (03/04/19 1003)  Toileting Discharge Goal Progress 1: Outcome not met, continue to monitor (03/04/19 1003)  Home Setting Transfer Discharge Goal 1: Mod I (03/04/19 1003)  Home Setting Transfer Discharge Goal Progress 1: Outcome not met, continue to monitor (03/04/19 1003)  Home Management Discharge Goal 1: item retrieval/transport with Mod I (03/04/19 1003)  Home Management Discharge Goal Progress 1: Outcome not met, continue to monitor (03/04/19 1003)        Total Treatment Time:  OT Time Spent: 45 minutes (03/06/19 1155)    See OT flowsheet for full details regarding the OT therapy provided. Hypothyroidism AZALEA (acute kidney injury)

## 2021-12-23 NOTE — PROGRESS NOTE ADULT - PROBLEM SELECTOR PLAN 6
#Shortness of breath   - Patient's son reports she was experiencing SOB in the day prior to admission   - CXR clear, RVP and covid negative   - blood cultures pending  - patient currently saturating well on room air     PLAN   - ctm  - resolved

## 2021-12-23 NOTE — PROGRESS NOTE ADULT - ASSESSMENT
71 F hx of T1DM (since age 38), thyroid cancer s/p resection in 2000 now with hypothyroidism admitted for DKA.     Pt is much more alert today and provided some history.   Patient was on multiple daily injection until october when she was switched to an insulin pump (tandem IQ).   However, pt reports she struggled with the pump due to parkinson's and arthritis.  She wasn't wearing her pump for more than 1 day when she went into DKA.    Pump setting:   Pump Type: Tandem   Insulin Start Date: 10/28/2021   Pump Settings:   Basal Rate:   Start Time: 12:00 AM ----- 0.9 units/hour            Insulin to Carb Ration (I:C):   Start Time: 12:00 AM ----- 1:15 units/hour           Insulin Sensitivity Factor = 30   BG Target = 130   Insulin on Board (IOB) Duration = 3.00 hours   Auto off set = 12 hours   Maximum Basal Rate = 2.0 units/hour   Maximum Bolus = 15 units   Before pump, she was on Toujeo 22 U HS and Apidra (carb+coverage ~ 10 units)     # DKA - on admission glucose 1029, pH 7.13, bicarb 8, AG 37, BHB 9, K 6.0   # Type 1 DM   # Non-compliance   - A1c 9.4%, a1c goal <7%   - insulin drip was stopped around 00:45 on 12/23 but pt received Lantus 18 around 10 AM on 12/22.    - primary team increased Lantus to 22 units and Lispro to 10 U TID with meals.    - Would c/w the current dose but monitor her carefully for possible hypoglycemia   - do not give scheduled prandial insulin if patient is NPO  - please monitor fingerstick blood glucose at least 4 times a day to avoid insulin toxicity, hypoglycemia; Blood glucose target while hospitalized 140-180 mg/dL  - Carbohydrate controlled diet, no concentrated sweets  - Counseled on need for medication adherence to avoid new complications.   - DISCHARGE: will not d/c on a pump.  pt is willing to be discharged on insulin pens.  She knows how to inject - dose TBD.     #Hypothyroidism   - patient with thyroid cancer s/p resection in 2000 now with hypothyroidism   - follows with Dr. Ennis outpatient, currently on levothyroxine 125mcg daily   - IV dose should be 80% of PO dose, so dose 100 mcg IV daily     Jamila William MD  Attending Physician   Department of Endocrinology, Diabetes and Metabolism   Cell: [phone]    If before 9AM or after 5PM, or on weekends/holidays, please call the Endocrine answering service for assistance ([phone]).  For nonurgent matters, please email [email or web address] for [email or web address].   > 40 minutes spent

## 2021-12-24 DIAGNOSIS — E10.65 TYPE 1 DIABETES MELLITUS WITH HYPERGLYCEMIA: ICD-10-CM

## 2021-12-24 LAB
ALBUMIN SERPL ELPH-MCNC: 2.7 G/DL — LOW (ref 3.3–5)
ALP SERPL-CCNC: 145 U/L — HIGH (ref 40–120)
ALT FLD-CCNC: 14 U/L — SIGNIFICANT CHANGE UP (ref 4–33)
ANION GAP SERPL CALC-SCNC: 9 MMOL/L — SIGNIFICANT CHANGE UP (ref 7–14)
AST SERPL-CCNC: 33 U/L — HIGH (ref 4–32)
BILIRUB SERPL-MCNC: 0.9 MG/DL — SIGNIFICANT CHANGE UP (ref 0.2–1.2)
BUN SERPL-MCNC: 22 MG/DL — SIGNIFICANT CHANGE UP (ref 7–23)
CALCIUM SERPL-MCNC: 8.9 MG/DL — SIGNIFICANT CHANGE UP (ref 8.4–10.5)
CHLORIDE SERPL-SCNC: 104 MMOL/L — SIGNIFICANT CHANGE UP (ref 98–107)
CO2 SERPL-SCNC: 26 MMOL/L — SIGNIFICANT CHANGE UP (ref 22–31)
CREAT SERPL-MCNC: 0.78 MG/DL — SIGNIFICANT CHANGE UP (ref 0.5–1.3)
GLUCOSE BLDC GLUCOMTR-MCNC: 145 MG/DL — HIGH (ref 70–99)
GLUCOSE BLDC GLUCOMTR-MCNC: 237 MG/DL — HIGH (ref 70–99)
GLUCOSE BLDC GLUCOMTR-MCNC: 346 MG/DL — HIGH (ref 70–99)
GLUCOSE BLDC GLUCOMTR-MCNC: 44 MG/DL — CRITICAL LOW (ref 70–99)
GLUCOSE BLDC GLUCOMTR-MCNC: 53 MG/DL — CRITICAL LOW (ref 70–99)
GLUCOSE BLDC GLUCOMTR-MCNC: 83 MG/DL — SIGNIFICANT CHANGE UP (ref 70–99)
GLUCOSE BLDC GLUCOMTR-MCNC: 96 MG/DL — SIGNIFICANT CHANGE UP (ref 70–99)
GLUCOSE SERPL-MCNC: 276 MG/DL — HIGH (ref 70–99)
HCT VFR BLD CALC: 40.1 % — SIGNIFICANT CHANGE UP (ref 34.5–45)
HCV AB S/CO SERPL IA: 0.13 S/CO — SIGNIFICANT CHANGE UP (ref 0–0.99)
HCV AB SERPL-IMP: SIGNIFICANT CHANGE UP
HGB BLD-MCNC: 12.5 G/DL — SIGNIFICANT CHANGE UP (ref 11.5–15.5)
MAGNESIUM SERPL-MCNC: 2.2 MG/DL — SIGNIFICANT CHANGE UP (ref 1.6–2.6)
MCHC RBC-ENTMCNC: 28.7 PG — SIGNIFICANT CHANGE UP (ref 27–34)
MCHC RBC-ENTMCNC: 31.2 GM/DL — LOW (ref 32–36)
MCV RBC AUTO: 92 FL — SIGNIFICANT CHANGE UP (ref 80–100)
NRBC # BLD: 0 /100 WBCS — SIGNIFICANT CHANGE UP
NRBC # FLD: 0 K/UL — SIGNIFICANT CHANGE UP
PHOSPHATE SERPL-MCNC: 1.7 MG/DL — LOW (ref 2.5–4.5)
PLATELET # BLD AUTO: 154 K/UL — SIGNIFICANT CHANGE UP (ref 150–400)
POTASSIUM SERPL-MCNC: 4.6 MMOL/L — SIGNIFICANT CHANGE UP (ref 3.5–5.3)
POTASSIUM SERPL-SCNC: 4.6 MMOL/L — SIGNIFICANT CHANGE UP (ref 3.5–5.3)
PROT SERPL-MCNC: 5.5 G/DL — LOW (ref 6–8.3)
RBC # BLD: 4.36 M/UL — SIGNIFICANT CHANGE UP (ref 3.8–5.2)
RBC # FLD: 17.2 % — HIGH (ref 10.3–14.5)
SODIUM SERPL-SCNC: 139 MMOL/L — SIGNIFICANT CHANGE UP (ref 135–145)
WBC # BLD: 10.88 K/UL — HIGH (ref 3.8–10.5)
WBC # FLD AUTO: 10.88 K/UL — HIGH (ref 3.8–10.5)

## 2021-12-24 PROCEDURE — 99232 SBSQ HOSP IP/OBS MODERATE 35: CPT

## 2021-12-24 RX ORDER — POTASSIUM PHOSPHATE, MONOBASIC POTASSIUM PHOSPHATE, DIBASIC 236; 224 MG/ML; MG/ML
30 INJECTION, SOLUTION INTRAVENOUS ONCE
Refills: 0 | Status: COMPLETED | OUTPATIENT
Start: 2021-12-24 | End: 2021-12-24

## 2021-12-24 RX ORDER — ACETAMINOPHEN 500 MG
650 TABLET ORAL EVERY 6 HOURS
Refills: 0 | Status: DISCONTINUED | OUTPATIENT
Start: 2021-12-24 | End: 2021-12-31

## 2021-12-24 RX ORDER — CHLORHEXIDINE GLUCONATE 213 G/1000ML
1 SOLUTION TOPICAL DAILY
Refills: 0 | Status: DISCONTINUED | OUTPATIENT
Start: 2021-12-24 | End: 2021-12-31

## 2021-12-24 RX ORDER — SODIUM CHLORIDE 0.65 %
1 AEROSOL, SPRAY (ML) NASAL
Refills: 0 | Status: DISCONTINUED | OUTPATIENT
Start: 2021-12-24 | End: 2021-12-31

## 2021-12-24 RX ORDER — ACETAMINOPHEN 500 MG
650 TABLET ORAL ONCE
Refills: 0 | Status: COMPLETED | OUTPATIENT
Start: 2021-12-24 | End: 2021-12-24

## 2021-12-24 RX ORDER — SODIUM,POTASSIUM PHOSPHATES 278-250MG
1 POWDER IN PACKET (EA) ORAL ONCE
Refills: 0 | Status: COMPLETED | OUTPATIENT
Start: 2021-12-24 | End: 2021-12-24

## 2021-12-24 RX ADMIN — Medication 650 MILLIGRAM(S): at 10:00

## 2021-12-24 RX ADMIN — Medication 2: at 13:04

## 2021-12-24 RX ADMIN — HEPARIN SODIUM 7500 UNIT(S): 5000 INJECTION INTRAVENOUS; SUBCUTANEOUS at 14:08

## 2021-12-24 RX ADMIN — INSULIN GLARGINE 22 UNIT(S): 100 INJECTION, SOLUTION SUBCUTANEOUS at 09:30

## 2021-12-24 RX ADMIN — Medication 650 MILLIGRAM(S): at 09:25

## 2021-12-24 RX ADMIN — Medication 1 TABLET(S): at 10:52

## 2021-12-24 RX ADMIN — ESCITALOPRAM OXALATE 10 MILLIGRAM(S): 10 TABLET, FILM COATED ORAL at 13:03

## 2021-12-24 RX ADMIN — ATORVASTATIN CALCIUM 40 MILLIGRAM(S): 80 TABLET, FILM COATED ORAL at 23:03

## 2021-12-24 RX ADMIN — POTASSIUM PHOSPHATE, MONOBASIC POTASSIUM PHOSPHATE, DIBASIC 83.33 MILLIMOLE(S): 236; 224 INJECTION, SOLUTION INTRAVENOUS at 10:52

## 2021-12-24 RX ADMIN — LORATADINE 10 MILLIGRAM(S): 10 TABLET ORAL at 13:03

## 2021-12-24 RX ADMIN — Medication 8 UNIT(S): at 13:04

## 2021-12-24 RX ADMIN — RASAGILINE 1 MILLIGRAM(S): 0.5 TABLET ORAL at 13:03

## 2021-12-24 RX ADMIN — CARBIDOPA AND LEVODOPA 1 TABLET(S): 25; 100 TABLET ORAL at 06:27

## 2021-12-24 RX ADMIN — HEPARIN SODIUM 7500 UNIT(S): 5000 INJECTION INTRAVENOUS; SUBCUTANEOUS at 06:27

## 2021-12-24 RX ADMIN — Medication 4: at 09:17

## 2021-12-24 RX ADMIN — CARBIDOPA AND LEVODOPA 1 TABLET(S): 25; 100 TABLET ORAL at 23:03

## 2021-12-24 RX ADMIN — Medication 2 CAPSULE(S): at 18:32

## 2021-12-24 RX ADMIN — HEPARIN SODIUM 7500 UNIT(S): 5000 INJECTION INTRAVENOUS; SUBCUTANEOUS at 23:03

## 2021-12-24 RX ADMIN — Medication 2 CAPSULE(S): at 10:06

## 2021-12-24 RX ADMIN — CARBIDOPA AND LEVODOPA 1 TABLET(S): 25; 100 TABLET ORAL at 13:05

## 2021-12-24 RX ADMIN — Medication 0.5 MILLIGRAM(S): at 23:04

## 2021-12-24 RX ADMIN — LOSARTAN POTASSIUM 100 MILLIGRAM(S): 100 TABLET, FILM COATED ORAL at 06:27

## 2021-12-24 RX ADMIN — CHLORHEXIDINE GLUCONATE 1 APPLICATION(S): 213 SOLUTION TOPICAL at 13:05

## 2021-12-24 RX ADMIN — Medication 8 UNIT(S): at 18:32

## 2021-12-24 RX ADMIN — Medication 8 UNIT(S): at 09:16

## 2021-12-24 RX ADMIN — PANTOPRAZOLE SODIUM 40 MILLIGRAM(S): 20 TABLET, DELAYED RELEASE ORAL at 06:27

## 2021-12-24 RX ADMIN — Medication 2 CAPSULE(S): at 14:08

## 2021-12-24 NOTE — PROGRESS NOTE ADULT - PROBLEM SELECTOR PLAN 8
#AZALEA   - patient with BUN 63, Cr 2.14 on admission (baseline creatinine 0.8),   - likely pre-renal in the setting of DKA   - will treat DKA and continue to trend BUN and creatinine  #Anion gap metabolic acidosis   - on admission pH 7.13, bicarb 8, AG 37   - likely multifactorial in origin with DKA, uremia and lactate contributing   - will continue to trend lactate and treat DKA and pre-renal AZALEA  - gap is 13 --> 10 now   #hypophosphatemia  - replete 12/22    PLAN  - Cr at 0.88 today, back to baseline  - gap has closed to 9  - electrolytes wnl  - trend BMP and replete as required #AZALEA   - patient with BUN 63, Cr 2.14 on admission (baseline creatinine 0.8),   - likely pre-renal in the setting of DKA   - will treat DKA and continue to trend BUN and creatinine  #Anion gap metabolic acidosis   - on admission pH 7.13, bicarb 8, AG 37   - likely multifactorial in origin with DKA, uremia and lactate contributing   - will continue to trend lactate and treat DKA and pre-renal AZALEA  - gap is 13 --> 10 now   #hypophosphatemia  - replete 12/22    PLAN  - Cr at 0.78 today, back to baseline  - gap has closed to 9  - electrolytes wnl  - trend BMP and replete as required

## 2021-12-24 NOTE — PROGRESS NOTE ADULT - PROBLEM SELECTOR PLAN 2
#HTN   - blood pressure currently well controlled   - On losartan 100 mg at home    PLAN  - c/w Losartan 100mg qdaily #HTN   - blood pressure currently well controlled   - On losartan 100 mg at home    PLAN  - pressures have been stable  - c/w Losartan 100mg qdaily

## 2021-12-24 NOTE — PROGRESS NOTE ADULT - SUBJECTIVE AND OBJECTIVE BOX
Diabetes  Follow up note    Interval History: Pt reports good appetite, eating meals, no nausea or vomiting    MEDICATIONS  (STANDING):  atorvastatin 40 milliGRAM(s) Oral at bedtime  carbidopa/levodopa  25/100 1 Tablet(s) Oral three times a day  chlorhexidine 2% Cloths 1 Application(s) Topical daily  dextrose 40% Gel 15 Gram(s) Oral once  dextrose 5%. 1000 milliLiter(s) (50 mL/Hr) IV Continuous <Continuous>  dextrose 5%. 1000 milliLiter(s) (100 mL/Hr) IV Continuous <Continuous>  dextrose 50% Injectable 25 Gram(s) IV Push once  dextrose 50% Injectable 12.5 Gram(s) IV Push once  dextrose 50% Injectable 25 Gram(s) IV Push once  escitalopram 10 milliGRAM(s) Oral daily  glucagon  Injectable 1 milliGRAM(s) IntraMuscular once  heparin   Injectable 7500 Unit(s) SubCutaneous every 8 hours  insulin glargine Injectable (LANTUS) 22 Unit(s) SubCutaneous every morning  insulin lispro (ADMELOG) corrective regimen sliding scale   SubCutaneous three times a day before meals  insulin lispro (ADMELOG) corrective regimen sliding scale   SubCutaneous at bedtime  insulin lispro Injectable (ADMELOG) 8 Unit(s) SubCutaneous three times a day before meals  levothyroxine Injectable 100 MICROGram(s) IV Push at bedtime  loratadine 10 milliGRAM(s) Oral daily  LORazepam     Tablet 0.5 milliGRAM(s) Oral at bedtime  losartan 100 milliGRAM(s) Oral daily  pancrelipase  (CREON 36,000 Lipase Units) 2 Capsule(s) Oral three times a day with meals  pantoprazole    Tablet 40 milliGRAM(s) Oral before breakfast  rasagiline Tablet 1 milliGRAM(s) Oral daily    MEDICATIONS  (PRN):  haloperidol    Injectable 2 milliGRAM(s) IV Push every 6 hours PRN Agitation      Allergies    Ceclor (Unknown)  iodine containing compounds (Unknown)  shellfish (Unknown)    Intolerances          PHYSICAL EXAM:  VITALS: T(C): 36.9 (12-24-21 @ 06:24)  T(F): 98.5 (12-24-21 @ 06:24), Max: 98.6 (12-23-21 @ 22:31)  HR: 80 (12-24-21 @ 06:24) (80 - 96)  BP: 144/61 (12-24-21 @ 06:24) (103/62 - 154/86)  RR:  (16 - 18)  SpO2:  (95% - 98%)  Wt(kg): 105  GENERAL:Lying in bed in NAD,   EYES: No proptosis,  HEENT:  Atraumatic, Normocephalic,   RESPIRATORY: Clear to auscultation bilaterally  CARDIOVASCULAR: Regular rhythm;  GI: Soft, nontender, non distended, normal bowel sounds  MUSCULOSKELETAL: normal strength      POCT Blood Glucose.: 346 mg/dL (12-24-21 @ 08:57)  POCT Blood Glucose.: 248 mg/dL (12-23-21 @ 21:55)  POCT Blood Glucose.: 125 mg/dL (12-23-21 @ 19:12)  POCT Blood Glucose.: 110 mg/dL (12-23-21 @ 19:08)  POCT Blood Glucose.: 96 mg/dL (12-23-21 @ 19:03)  POCT Blood Glucose.: 122 mg/dL (12-23-21 @ 18:57)  POCT Blood Glucose.: 93 mg/dL (12-23-21 @ 18:39)  POCT Blood Glucose.: 50 mg/dL (12-23-21 @ 18:19)  POCT Blood Glucose.: 51 mg/dL (12-23-21 @ 18:00)  POCT Blood Glucose.: 215 mg/dL (12-23-21 @ 12:31)  POCT Blood Glucose.: 309 mg/dL (12-23-21 @ 09:45)  POCT Blood Glucose.: 298 mg/dL (12-23-21 @ 07:28)  POCT Blood Glucose.: 140 mg/dL (12-23-21 @ 02:44)  POCT Blood Glucose.: 99 mg/dL (12-22-21 @ 23:03)  POCT Blood Glucose.: 100 mg/dL (12-22-21 @ 22:10)  POCT Blood Glucose.: 92 mg/dL (12-22-21 @ 20:59)  POCT Blood Glucose.: 127 mg/dL (12-22-21 @ 20:05)  POCT Blood Glucose.: 199 mg/dL (12-22-21 @ 18:49)  POCT Blood Glucose.: 307 mg/dL (12-22-21 @ 16:45)  POCT Blood Glucose.: 336 mg/dL (12-22-21 @ 15:53)  POCT Blood Glucose.: 290 mg/dL (12-22-21 @ 14:45)  POCT Blood Glucose.: 135 mg/dL (12-22-21 @ 12:58)  POCT Blood Glucose.: 90 mg/dL (12-22-21 @ 12:31)  POCT Blood Glucose.: 99 mg/dL (12-22-21 @ 12:09)  POCT Blood Glucose.: 135 mg/dL (12-22-21 @ 10:06)  POCT Blood Glucose.: 138 mg/dL (12-22-21 @ 09:08)  POCT Blood Glucose.: 148 mg/dL (12-22-21 @ 08:15)  POCT Blood Glucose.: 178 mg/dL (12-22-21 @ 07:03)  POCT Blood Glucose.: 193 mg/dL (12-22-21 @ 06:10)  POCT Blood Glucose.: 192 mg/dL (12-22-21 @ 05:10)  POCT Blood Glucose.: 176 mg/dL (12-22-21 @ 04:18)  POCT Blood Glucose.: 184 mg/dL (12-22-21 @ 03:03)  POCT Blood Glucose.: 184 mg/dL (12-22-21 @ 02:04)  POCT Blood Glucose.: 204 mg/dL (12-22-21 @ 01:02)  POCT Blood Glucose.: 186 mg/dL (12-22-21 @ 00:13)  POCT Blood Glucose.: 227 mg/dL (12-21-21 @ 23:21)  POCT Blood Glucose.: 190 mg/dL (12-21-21 @ 22:04)  POCT Blood Glucose.: 162 mg/dL (12-21-21 @ 21:01)  POCT Blood Glucose.: 148 mg/dL (12-21-21 @ 20:03)  POCT Blood Glucose.: 139 mg/dL (12-21-21 @ 19:09)  POCT Blood Glucose.: 122 mg/dL (12-21-21 @ 18:20)  POCT Blood Glucose.: 136 mg/dL (12-21-21 @ 17:10)  POCT Blood Glucose.: 171 mg/dL (12-21-21 @ 16:12)  POCT Blood Glucose.: 227 mg/dL (12-21-21 @ 15:09)  POCT Blood Glucose.: 239 mg/dL (12-21-21 @ 14:19)  POCT Blood Glucose.: 297 mg/dL (12-21-21 @ 13:18)  POCT Blood Glucose.: 244 mg/dL (12-21-21 @ 12:07)  POCT Blood Glucose.: 237 mg/dL (12-21-21 @ 11:06)  POCT Blood Glucose.: 210 mg/dL (12-21-21 @ 10:13)        12-23    141  |  106  |  27<H>  ----------------------------<  356<H>  4.1   |  26  |  0.88    EGFR if : 76  EGFR if non : 65    Ca    9.1      12-23  Mg     2.20     12-23  Phos  2.5     12-22    TPro  5.3<L>  /  Alb  3.2<L>  /  TBili  0.9  /  DBili  x   /  AST  42<H>  /  ALT  27  /  AlkPhos  118  12-23        A1C with Estimated Average Glucose Result: 9.4 % (12-20-21 @ 19:29)

## 2021-12-24 NOTE — PROGRESS NOTE ADULT - ASSESSMENT
71F hx of T1DM (since age 38), Parkinsons (diagnosed 2017), bladder incontinence, anxiety/depression, HTN, HLD, thyroid cancer s/p resection in 2000 now with hypothyroidism presents to the ED d/t SOB, nausea, vomiting and AMS for one day. Patient found to be in DKA and admitted to the MICU for further management. Now improved and step down to the floor.

## 2021-12-24 NOTE — PROGRESS NOTE ADULT - PROBLEM SELECTOR PLAN 3
Patient has Creon on med rec with meals and snacks, likely in the setting of pancreatic insufficiency due to prolonged diabetes.    PLAN  - c/w Creon 46642 units 2 capsules w/ breakfast, lunch, dinner  - c/w Creon 74689 units 1 capsule w/ snacks

## 2021-12-24 NOTE — PROGRESS NOTE ADULT - ATTENDING COMMENTS
hypoglycemic event x 2 overnight - premeal down to 8 from 10  f/u endo recs  prior ucx neg - doubt p will have growth  dispo p endo

## 2021-12-24 NOTE — PROGRESS NOTE ADULT - SUBJECTIVE AND OBJECTIVE BOX
Dale Valencia MD  PGY-1 Internal Medicine  Pager:  [phone] (ns) [phone] ([facility])  Available on Microsoft Teams  21 @ 07:13  _________________________________________________________________________________________________________________________________________    CC:      Patient is a 71y old  Female who presents with a chief complaint of Hyperglycemia (23 Dec 2021 12:12)        SUBJECTIVE:    NAEO.      _________________________________________________________________________________________________________________________________________    OBJECTIVE:    Vital Signs Last 24 Hrs  T(C): 36.9 (24 Dec 2021 06:24), Max: 37 (23 Dec 2021 22:31)  T(F): 98.5 (24 Dec 2021 06:24), Max: 98.6 (23 Dec 2021 22:31)  HR: 80 (24 Dec 2021 06:24) (80 - 96)  BP: 144/61 (24 Dec 2021 06:24) (103/62 - 154/86)  BP(mean): --  RR: 16 (24 Dec 2021 06:24) (16 - 18)  SpO2: 98% (24 Dec 2021 06:24) (95% - 98%)    I&O's Summary    23 Dec 2021 07:01  -  24 Dec 2021 07:00  --------------------------------------------------------  IN: 0 mL / OUT: 1000 mL / NET: -1000 mL        MEDICATIONS  (STANDING):  atorvastatin 40 milliGRAM(s) Oral at bedtime  carbidopa/levodopa  25/100 1 Tablet(s) Oral three times a day  chlorhexidine 2% Cloths 1 Application(s) Topical daily  dextrose 40% Gel 15 Gram(s) Oral once  dextrose 5%. 1000 milliLiter(s) (100 mL/Hr) IV Continuous <Continuous>  dextrose 5%. 1000 milliLiter(s) (50 mL/Hr) IV Continuous <Continuous>  dextrose 50% Injectable 25 Gram(s) IV Push once  dextrose 50% Injectable 12.5 Gram(s) IV Push once  dextrose 50% Injectable 25 Gram(s) IV Push once  escitalopram 10 milliGRAM(s) Oral daily  glucagon  Injectable 1 milliGRAM(s) IntraMuscular once  heparin   Injectable 7500 Unit(s) SubCutaneous every 8 hours  insulin glargine Injectable (LANTUS) 22 Unit(s) SubCutaneous every morning  insulin lispro (ADMELOG) corrective regimen sliding scale   SubCutaneous three times a day before meals  insulin lispro (ADMELOG) corrective regimen sliding scale   SubCutaneous at bedtime  insulin lispro Injectable (ADMELOG) 8 Unit(s) SubCutaneous three times a day before meals  levothyroxine Injectable 100 MICROGram(s) IV Push at bedtime  loratadine 10 milliGRAM(s) Oral daily  LORazepam     Tablet 0.5 milliGRAM(s) Oral at bedtime  losartan 100 milliGRAM(s) Oral daily  pancrelipase  (CREON 36,000 Lipase Units) 2 Capsule(s) Oral three times a day with meals  pantoprazole    Tablet 40 milliGRAM(s) Oral before breakfast  rasagiline Tablet 1 milliGRAM(s) Oral daily    MEDICATIONS  (PRN):  haloperidol    Injectable 2 milliGRAM(s) IV Push every 6 hours PRN Agitation      REVIEW OF SYSTEMS:    CONSTITUTIONAL:  Denies weight loss, fever, chills, weakness or fatigue, headache.  EYES:  Denies vision loss, blurred vision, double vision or yellow sclerae.   ENT:  Denies hearing loss, sneezing, congestion, runny nose or sore throat.  CARDIOVASCULAR:  Denies chest pain, chest pressure or chest discomfort. Denies palpitations, or tachycardia.  RESPIRATORY:  Denies shortness of breath, cough or sputum.  GASTROINTESTINAL:  Denies anorexia, nausea, vomiting, constipation, or diarrhea. Denies abdominal pain or blood.  NEUROLOGICAL:  Denies headache, dizziness, syncope, numbness or tingling in the extremities.  PSYCHIATRIC:  Denies history of depression or anxiety.      PHYSICAL EXAM:    GENERAL: Laying comfortably, NAD  EYES: EOMI, PERRL, no scleral icterus  NECK: No JVD  LUNG: Clear to auscultation bilaterally; No wheeze, crackles or rhonci  HEART: Regular rate and rhythm; No murmurs, rubs, or gallops  ABDOMEN: Soft, Nontender, Nondistended  EXTREMITIES:  No LE edema, 2+ Peripheral Pulses, No clubbing, cyanosis, or edema  PSYCH: AAOx3  NEUROLOGY: non-focal, strength 5/5 in all extremities, sensation intact  SKIN: No rashes or lesions      LABS:                            12.5   12.62 )-----------( 165      ( 23 Dec 2021 11:52 )             38.6     Auto Eosinophil # x     / Auto Eosinophil % x     / Auto Neutrophil # x     / Auto Neutrophil % x     / BANDS % x            141  |  106  |  27<H>  ----------------------------<  356<H>  4.1   |  26  |  0.88      141  |  108<H>  |  34<H>  ----------------------------<  319<H>  5.2   |  20<L>  |  1.02    Ca    9.1      23 Dec 2021 11:40  Mg     2.20       Phos  2.5       TPro  5.3<L>  /  Alb  3.2<L>  /  TBili  0.9  /  DBili  x   /  AST  42<H>  /  ALT  27  /  AlkPhos  118            Urinalysis Basic - ( 22 Dec 2021 23:29 )    Color: Yellow / Appearance: Slightly Turbid / S.021 / pH: x  Gluc: x / Ketone: Trace  / Bili: Negative / Urobili: <2 mg/dL   Blood: x / Protein: 30 mg/dL / Nitrite: Negative   Leuk Esterase: Large / RBC: 24 /HPF / WBC 82 /HPF   Sq Epi: x / Non Sq Epi: 2 /HPF / Bacteria: Negative        RADIOLOGY & ADDITIONAL TESTS:    Imaging Personally Reviewed:    Consultant(s) Notes Reviewed:      Care Discussed with Consultants/Other Providers:      _________________________________________________________________________________________________________________________________________  Dale Valencia MD  PGY-1 Internal Medicine  Pager: [phone] (NS) [phone] ([facility])  Available on Microsoft Teams  24 Dec 2021 07:13         Dale Valencia MD  PGY-1 Internal Medicine  Pager:  [phone] (ns) [phone] ([facility])  Available on [employer] Teams  21 @ 07:13  _________________________________________________________________________________________________________________________________________    CC:      Patient is a 71y old  Female who presents with a chief complaint of Hyperglycemia (23 Dec 2021 12:12)        SUBJECTIVE:    NAEO.    Patient this morning states that the pain on the room of the mouth is improving, but that she is having some issues with swallowing, which has been chronic since her thyroid surgery. Additionally, she states that she is having some tenderness and congestion in her sinuses.    Denies any f/c, no n/v/d/c, no SOB, no chest pain, no abdominal pain, increased urinary frequency i/s/o increased blood glucose. Patient did not receive premeal insulin last night.  _________________________________________________________________________________________________________________________________________    OBJECTIVE:    Vital Signs Last 24 Hrs  T(C): 36.9 (24 Dec 2021 06:24), Max: 37 (23 Dec 2021 22:31)  T(F): 98.5 (24 Dec 2021 06:24), Max: 98.6 (23 Dec 2021 22:31)  HR: 80 (24 Dec 2021 06:24) (80 - 96)  BP: 144/61 (24 Dec 2021 06:24) (103/62 - 154/86)  BP(mean): --  RR: 16 (24 Dec 2021 06:24) (16 - 18)  SpO2: 98% (24 Dec 2021 06:24) (95% - 98%)    I&O's Summary    23 Dec 2021 07:01  -  24 Dec 2021 07:00  --------------------------------------------------------  IN: 0 mL / OUT: 1000 mL / NET: -1000 mL        MEDICATIONS  (STANDING):  atorvastatin 40 milliGRAM(s) Oral at bedtime  carbidopa/levodopa  25/100 1 Tablet(s) Oral three times a day  chlorhexidine 2% Cloths 1 Application(s) Topical daily  dextrose 40% Gel 15 Gram(s) Oral once  dextrose 5%. 1000 milliLiter(s) (100 mL/Hr) IV Continuous <Continuous>  dextrose 5%. 1000 milliLiter(s) (50 mL/Hr) IV Continuous <Continuous>  dextrose 50% Injectable 25 Gram(s) IV Push once  dextrose 50% Injectable 12.5 Gram(s) IV Push once  dextrose 50% Injectable 25 Gram(s) IV Push once  escitalopram 10 milliGRAM(s) Oral daily  glucagon  Injectable 1 milliGRAM(s) IntraMuscular once  heparin   Injectable 7500 Unit(s) SubCutaneous every 8 hours  insulin glargine Injectable (LANTUS) 22 Unit(s) SubCutaneous every morning  insulin lispro (ADMELOG) corrective regimen sliding scale   SubCutaneous three times a day before meals  insulin lispro (ADMELOG) corrective regimen sliding scale   SubCutaneous at bedtime  insulin lispro Injectable (ADMELOG) 8 Unit(s) SubCutaneous three times a day before meals  levothyroxine Injectable 100 MICROGram(s) IV Push at bedtime  loratadine 10 milliGRAM(s) Oral daily  LORazepam     Tablet 0.5 milliGRAM(s) Oral at bedtime  losartan 100 milliGRAM(s) Oral daily  pancrelipase  (CREON 36,000 Lipase Units) 2 Capsule(s) Oral three times a day with meals  pantoprazole    Tablet 40 milliGRAM(s) Oral before breakfast  rasagiline Tablet 1 milliGRAM(s) Oral daily    MEDICATIONS  (PRN):  haloperidol    Injectable 2 milliGRAM(s) IV Push every 6 hours PRN Agitation      PHYSICAL EXAM:    GENERAL: Laying comfortably, NAD  EYES: EOMI, PERRL, no scleral icterus  HEENT: tenderness to palpation of the sinuses, with pressure, some dryness to the nostrils, no dried blood, mouth has slightly less erythema than prior, no exudates noted, s/p tonsillectomy, soft palate is erythematous, but improved  NECK: No JVD  LUNG: Clear to auscultation bilaterally; No wheeze, crackles or rhonci  HEART: Regular rate and rhythm; No murmurs, rubs, or gallops  ABDOMEN: Soft, Nontender, Nondistended  EXTREMITIES:  No LE edema, 2+ Peripheral Pulses, No clubbing, cyanosis, or edema  PSYCH: AAOx3  NEUROLOGY: non-focal, strength 5/5 in all extremities, sensation intact  SKIN: No rashes or lesions      Labs:                        12.5   10.88 )-----------( 154      ( 24 Dec 2021 09:21 )             40.1     Auto Eosinophil # x     / Auto Eosinophil % x     / Auto Neutrophil # x     / Auto Neutrophil % x     / BANDS % x                            12.5   12.62 )-----------( 165      ( 23 Dec 2021 11:52 )             38.6     Auto Eosinophil # x     / Auto Eosinophil % x     / Auto Neutrophil # x     / Auto Neutrophil % x     / BANDS % x        Hgb Trend: 12.5<--, 12.5<--, 12.6<--, 11.2<--, 11.7<--    12-24    139  |  104  |  22  ----------------------------<  276<H>  4.6   |  26  |  0.78      141  |  106  |  27<H>  ----------------------------<  356<H>  4.1   |  26  |  0.88      141  |  108<H>  |  34<H>  ----------------------------<  319<H>  5.2   |  20<L>  |  1.02    Ca    8.9      24 Dec 2021 09:21  Mg     2.20       Phos  1.7       TPro  5.5<L>  /  Alb  2.7<L>  /  TBili  0.9  /  DBili  x   /  AST  33<H>  /  ALT  14  /  AlkPhos  145<H>    TPro  5.3<L>  /  Alb  3.2<L>  /  TBili  0.9  /  DBili  x   /  AST  42<H>  /  ALT  27  /  AlkPhos  118      Creatinine Trend: 0.78<--, 0.88<--, 1.02<--, 1.30<--, 1.51<--, 1.76<--        Urinalysis Basic - ( 22 Dec 2021 23:29 )    Color: Yellow / Appearance: Slightly Turbid / S.021 / pH: x  Gluc: x / Ketone: Trace  / Bili: Negative / Urobili: <2 mg/dL   Blood: x / Protein: 30 mg/dL / Nitrite: Negative   Leuk Esterase: Large / RBC: 24 /HPF / WBC 82 /HPF   Sq Epi: x / Non Sq Epi: 2 /HPF / Bacteria: Negative      Labs result reviewed by me.  21 @ 10:52        RADIOLOGY & ADDITIONAL TESTS:    < from: Xray Chest 1 View- PORTABLE-Urgent (Xray Chest 1 View- PORTABLE-Urgent .) (21 @ 21:49) >    The lungs are clear. No pleural effusion.    Heart size is without change.    Partially imaged right shoulder arthroplasty.    Degenerative changes of the spine.      IMPRESSION:    Clear lungs.    < end of copied text >        _________________________________________________________________________________________________________________________________________  Dale Valencia MD  PGY-1 Internal Medicine  Pager: [phone] (NS) [phone] ([facility])  Available on Microsoft Teams  24 Dec 2021 07:13

## 2021-12-24 NOTE — PROGRESS NOTE ADULT - PROBLEM SELECTOR PLAN 1
#Diabetes with DKA  - Patient with hx of DM1, non-complaint with basal bolus at home, A1C 9.4% on admission  - on admission glucose 1029, pH 7.13, bicarb 8, AG 37, BHB 9, K 6.0   - patient received insulin drip at 9units per hour while in MICU, adjusted per DKA protocol  - s/p 2L of fluids, now on D5 until patient eats  - PO diet was started, patient received 18 of Lantus in the AM  - sugars elevated in the AM, but improving throughout the day    PLAN  - ctm sugars with meals and bedtime - goal 140 - 180  - endocrinology recommendations appreciated  - Lantus 22 sq for bedtime basal insulin  - Admelog 10 TID before meals - update: patient hypoglycaemic prior to dinner, reduced to 8TID   - moderate dose sliding scale for meals, LDCS for basal  - DISCHARGE: will not d/c on a pump.  pt is willing to be discharged on insulin pens.  She knows how to inject - dose TBD. #Diabetes with DKA  - Patient with hx of DM1, non-complaint with basal bolus at home, A1C 9.4% on admission  - on admission glucose 1029, pH 7.13, bicarb 8, AG 37, BHB 9, K 6.0   - patient received insulin drip at 9units per hour while in MICU, adjusted per DKA protocol  - s/p 2L of fluids, now on D5 until patient eats  - PO diet was started, patient received 18 of Lantus in the AM  - sugars elevated in the AM, but improving throughout the day    PLAN  - ctm sugars with meals and bedtime - goal 140 - 180; elevated overnight, had overcorrected, did not receive PM dose of Admelog  - endocrinology recommendations appreciated  - Lantus 22 sq for bedtime basal insulin  - Admelog 8 TID before meals  - LDCS for both meals and bedtime  - DISCHARGE: will not d/c on a pump.  pt is willing to be discharged on insulin pens.  She knows how to inject - dose TBD.

## 2021-12-24 NOTE — PROGRESS NOTE ADULT - ASSESSMENT
71 year old woman with T1DM, uncontrolled, previously on insulin pump, admitted with DKA    1) T1DM, uncontrolled  DKA resolved  Pt with hypoglycemia yesterday, prandial insulin dose was decreased  Pt with fasting hyperglycemia this morning, basal insulin dose was increased  Continue to monitor on Lantus 22 and Admelog 8 before meals with admelog correction scale  Inpatient glucose goal 100 - 180 mg/dl  PT was having difficulty using the insulin pump, particularly filling the reservoir with insertion site changes, would like to continue on insulin injections    Plan to discharge on basal bolus insulin, doses to be determined prior to discharge.  She had previously been on Toujeo and Apidra  She will follow up with Dr Sophia Terrell post discharge    Hyperlipidemia   - continue atorvastatin    Thyroid cancer, hypothyroidism  Pt tolerating po.   Change levothyroxine to PO dosing.  125 micrograms po daily    Discussed with primary team    Pat Cano MD  Diabetes team: [phone] business hours  [phone] night/weekend  pager

## 2021-12-25 LAB
ALBUMIN SERPL ELPH-MCNC: 2.6 G/DL — LOW (ref 3.3–5)
ALP SERPL-CCNC: 112 U/L — SIGNIFICANT CHANGE UP (ref 40–120)
ALT FLD-CCNC: 5 U/L — SIGNIFICANT CHANGE UP (ref 4–33)
ANION GAP SERPL CALC-SCNC: 10 MMOL/L — SIGNIFICANT CHANGE UP (ref 7–14)
AST SERPL-CCNC: 22 U/L — SIGNIFICANT CHANGE UP (ref 4–32)
BILIRUB SERPL-MCNC: 0.8 MG/DL — SIGNIFICANT CHANGE UP (ref 0.2–1.2)
BUN SERPL-MCNC: 18 MG/DL — SIGNIFICANT CHANGE UP (ref 7–23)
CALCIUM SERPL-MCNC: 8.5 MG/DL — SIGNIFICANT CHANGE UP (ref 8.4–10.5)
CHLORIDE SERPL-SCNC: 101 MMOL/L — SIGNIFICANT CHANGE UP (ref 98–107)
CO2 SERPL-SCNC: 25 MMOL/L — SIGNIFICANT CHANGE UP (ref 22–31)
CREAT SERPL-MCNC: 0.72 MG/DL — SIGNIFICANT CHANGE UP (ref 0.5–1.3)
CULTURE RESULTS: SIGNIFICANT CHANGE UP
GLUCOSE BLDC GLUCOMTR-MCNC: 195 MG/DL — HIGH (ref 70–99)
GLUCOSE BLDC GLUCOMTR-MCNC: 205 MG/DL — HIGH (ref 70–99)
GLUCOSE BLDC GLUCOMTR-MCNC: 256 MG/DL — HIGH (ref 70–99)
GLUCOSE BLDC GLUCOMTR-MCNC: 331 MG/DL — HIGH (ref 70–99)
GLUCOSE BLDC GLUCOMTR-MCNC: 91 MG/DL — SIGNIFICANT CHANGE UP (ref 70–99)
GLUCOSE SERPL-MCNC: 341 MG/DL — HIGH (ref 70–99)
HCT VFR BLD CALC: 37.5 % — SIGNIFICANT CHANGE UP (ref 34.5–45)
HGB BLD-MCNC: 11.6 G/DL — SIGNIFICANT CHANGE UP (ref 11.5–15.5)
MAGNESIUM SERPL-MCNC: 2 MG/DL — SIGNIFICANT CHANGE UP (ref 1.6–2.6)
MCHC RBC-ENTMCNC: 28.9 PG — SIGNIFICANT CHANGE UP (ref 27–34)
MCHC RBC-ENTMCNC: 30.9 GM/DL — LOW (ref 32–36)
MCV RBC AUTO: 93.3 FL — SIGNIFICANT CHANGE UP (ref 80–100)
NRBC # BLD: 0 /100 WBCS — SIGNIFICANT CHANGE UP
NRBC # FLD: 0 K/UL — SIGNIFICANT CHANGE UP
PHOSPHATE SERPL-MCNC: 2.6 MG/DL — SIGNIFICANT CHANGE UP (ref 2.5–4.5)
PLATELET # BLD AUTO: 149 K/UL — LOW (ref 150–400)
POTASSIUM SERPL-MCNC: 4.7 MMOL/L — SIGNIFICANT CHANGE UP (ref 3.5–5.3)
POTASSIUM SERPL-SCNC: 4.7 MMOL/L — SIGNIFICANT CHANGE UP (ref 3.5–5.3)
PROT SERPL-MCNC: 5.1 G/DL — LOW (ref 6–8.3)
RBC # BLD: 4.02 M/UL — SIGNIFICANT CHANGE UP (ref 3.8–5.2)
RBC # FLD: 16.8 % — HIGH (ref 10.3–14.5)
SODIUM SERPL-SCNC: 136 MMOL/L — SIGNIFICANT CHANGE UP (ref 135–145)
SPECIMEN SOURCE: SIGNIFICANT CHANGE UP
WBC # BLD: 11.61 K/UL — HIGH (ref 3.8–10.5)
WBC # FLD AUTO: 11.61 K/UL — HIGH (ref 3.8–10.5)

## 2021-12-25 PROCEDURE — 99232 SBSQ HOSP IP/OBS MODERATE 35: CPT | Mod: GC

## 2021-12-25 PROCEDURE — 99232 SBSQ HOSP IP/OBS MODERATE 35: CPT

## 2021-12-25 RX ORDER — INSULIN LISPRO 100/ML
6 VIAL (ML) SUBCUTANEOUS
Refills: 0 | Status: DISCONTINUED | OUTPATIENT
Start: 2021-12-25 | End: 2021-12-26

## 2021-12-25 RX ORDER — LEVOTHYROXINE SODIUM 125 MCG
125 TABLET ORAL DAILY
Refills: 0 | Status: DISCONTINUED | OUTPATIENT
Start: 2021-12-25 | End: 2021-12-31

## 2021-12-25 RX ORDER — ACETAMINOPHEN 500 MG
650 TABLET ORAL ONCE
Refills: 0 | Status: COMPLETED | OUTPATIENT
Start: 2021-12-25 | End: 2021-12-25

## 2021-12-25 RX ORDER — INSULIN LISPRO 100/ML
5 VIAL (ML) SUBCUTANEOUS
Refills: 0 | Status: DISCONTINUED | OUTPATIENT
Start: 2021-12-25 | End: 2021-12-25

## 2021-12-25 RX ORDER — DIPHENHYDRAMINE HYDROCHLORIDE AND LIDOCAINE HYDROCHLORIDE AND ALUMINUM HYDROXIDE AND MAGNESIUM HYDRO
10 KIT EVERY 12 HOURS
Refills: 0 | Status: DISCONTINUED | OUTPATIENT
Start: 2021-12-25 | End: 2021-12-31

## 2021-12-25 RX ORDER — INSULIN GLARGINE 100 [IU]/ML
24 INJECTION, SOLUTION SUBCUTANEOUS
Refills: 0 | Status: DISCONTINUED | OUTPATIENT
Start: 2021-12-26 | End: 2021-12-26

## 2021-12-25 RX ADMIN — LOSARTAN POTASSIUM 100 MILLIGRAM(S): 100 TABLET, FILM COATED ORAL at 06:47

## 2021-12-25 RX ADMIN — Medication 8 UNIT(S): at 09:02

## 2021-12-25 RX ADMIN — ESCITALOPRAM OXALATE 10 MILLIGRAM(S): 10 TABLET, FILM COATED ORAL at 11:50

## 2021-12-25 RX ADMIN — LORATADINE 10 MILLIGRAM(S): 10 TABLET ORAL at 11:50

## 2021-12-25 RX ADMIN — CARBIDOPA AND LEVODOPA 1 TABLET(S): 25; 100 TABLET ORAL at 06:47

## 2021-12-25 RX ADMIN — CHLORHEXIDINE GLUCONATE 1 APPLICATION(S): 213 SOLUTION TOPICAL at 11:51

## 2021-12-25 RX ADMIN — Medication 100 MICROGRAM(S): at 00:19

## 2021-12-25 RX ADMIN — Medication 650 MILLIGRAM(S): at 21:13

## 2021-12-25 RX ADMIN — HEPARIN SODIUM 7500 UNIT(S): 5000 INJECTION INTRAVENOUS; SUBCUTANEOUS at 06:46

## 2021-12-25 RX ADMIN — PANTOPRAZOLE SODIUM 40 MILLIGRAM(S): 20 TABLET, DELAYED RELEASE ORAL at 06:47

## 2021-12-25 RX ADMIN — Medication 5 UNIT(S): at 13:06

## 2021-12-25 RX ADMIN — HEPARIN SODIUM 7500 UNIT(S): 5000 INJECTION INTRAVENOUS; SUBCUTANEOUS at 21:14

## 2021-12-25 RX ADMIN — Medication 3: at 13:05

## 2021-12-25 RX ADMIN — INSULIN GLARGINE 22 UNIT(S): 100 INJECTION, SOLUTION SUBCUTANEOUS at 09:05

## 2021-12-25 RX ADMIN — CARBIDOPA AND LEVODOPA 1 TABLET(S): 25; 100 TABLET ORAL at 21:12

## 2021-12-25 RX ADMIN — Medication 2 CAPSULE(S): at 11:50

## 2021-12-25 RX ADMIN — Medication 2 CAPSULE(S): at 18:11

## 2021-12-25 RX ADMIN — HEPARIN SODIUM 7500 UNIT(S): 5000 INJECTION INTRAVENOUS; SUBCUTANEOUS at 13:07

## 2021-12-25 RX ADMIN — ATORVASTATIN CALCIUM 40 MILLIGRAM(S): 80 TABLET, FILM COATED ORAL at 21:12

## 2021-12-25 RX ADMIN — Medication 650 MILLIGRAM(S): at 22:10

## 2021-12-25 RX ADMIN — CARBIDOPA AND LEVODOPA 1 TABLET(S): 25; 100 TABLET ORAL at 13:06

## 2021-12-25 RX ADMIN — RASAGILINE 1 MILLIGRAM(S): 0.5 TABLET ORAL at 11:49

## 2021-12-25 RX ADMIN — DIPHENHYDRAMINE HYDROCHLORIDE AND LIDOCAINE HYDROCHLORIDE AND ALUMINUM HYDROXIDE AND MAGNESIUM HYDRO 10 MILLILITER(S): KIT at 18:12

## 2021-12-25 RX ADMIN — Medication 4: at 09:02

## 2021-12-25 RX ADMIN — Medication 0.5 MILLIGRAM(S): at 21:14

## 2021-12-25 NOTE — PROGRESS NOTE ADULT - ASSESSMENT
71 year old woman with T1DM, uncontrolled, previously on insulin pump, admitted with DKA. Also history of thyroid cancer and hypothyroidism.    1) T1DM, uncontrolled  DKA resolved  Pt with hypoglycemia yesterday PM, Admelog premeal insulin dose was decreased from 8 to 5 per primary team.  Pt with fasting hyperglycemia this morning - recommend increase Lantus from 22 to 24 units every morning.  Would raise Admelog slightly to 6/6/6 units premeal TID.  Low scale premeal and low bedtime scale.  Inpatient glucose goal 100 - 180 mg/dl  PT was having difficulty using the insulin pump, particularly filling the reservoir with insertion site changes, would like to continue on insulin pen injections at discharge.    Plan to discharge on basal bolus insulin, doses to be determined prior to discharge.  She had previously been on Toujeo and Apidra  She will follow up with Dr Sophia Terrell post discharge    2) Hyperlipidemia   - continue atorvastatin    3) Thyroid cancer, hypothyroidism  Pt tolerating po.   continue levothyroxine 125 micrograms po daily    Sophia Pena MD  Division of Endocrinology  Pager: [phone]    If after 6PM or before 9AM, or on weekends/holidays, please call endocrine answering service for assistance ([phone]).  For nonurgent matters email [email or web address] for [email or web address].

## 2021-12-25 NOTE — PROGRESS NOTE ADULT - SUBJECTIVE AND OBJECTIVE BOX
Jose Luis Green MD   PGY3 Internal Medicine  Pager [phone] ([facility]) [phone] ([facility])  Available on [employer] Teams     SUBJECTIVE / OVERNIGHT EVENTS:    Hypoglycemic ON now resolved.   No acute events overnight. Patient seen and evaluated at bedside. No fever/chills.  Denies SOB at rest, chest pain, palpitations, abdominal pain, nausea/vomiting    MEDICATIONS  (STANDING):  atorvastatin 40 milliGRAM(s) Oral at bedtime  carbidopa/levodopa  25/100 1 Tablet(s) Oral three times a day  chlorhexidine 2% Cloths 1 Application(s) Topical daily  dextrose 40% Gel 15 Gram(s) Oral once  dextrose 5%. 1000 milliLiter(s) (50 mL/Hr) IV Continuous <Continuous>  dextrose 5%. 1000 milliLiter(s) (100 mL/Hr) IV Continuous <Continuous>  dextrose 50% Injectable 25 Gram(s) IV Push once  dextrose 50% Injectable 12.5 Gram(s) IV Push once  dextrose 50% Injectable 25 Gram(s) IV Push once  escitalopram 10 milliGRAM(s) Oral daily  glucagon  Injectable 1 milliGRAM(s) IntraMuscular once  heparin   Injectable 7500 Unit(s) SubCutaneous every 8 hours  insulin glargine Injectable (LANTUS) 22 Unit(s) SubCutaneous every morning  insulin lispro (ADMELOG) corrective regimen sliding scale   SubCutaneous three times a day before meals  insulin lispro (ADMELOG) corrective regimen sliding scale   SubCutaneous at bedtime  insulin lispro Injectable (ADMELOG) 5 Unit(s) SubCutaneous three times a day before meals  levothyroxine 125 MICROGram(s) Oral daily  loratadine 10 milliGRAM(s) Oral daily  LORazepam     Tablet 0.5 milliGRAM(s) Oral at bedtime  losartan 100 milliGRAM(s) Oral daily  pancrelipase  (CREON 36,000 Lipase Units) 2 Capsule(s) Oral three times a day with meals  pantoprazole    Tablet 40 milliGRAM(s) Oral before breakfast  rasagiline Tablet 1 milliGRAM(s) Oral daily    MEDICATIONS  (PRN):  acetaminophen     Tablet .. 650 milliGRAM(s) Oral every 6 hours PRN Temp greater or equal to 38C (100.4F), Mild Pain (1 - 3)  haloperidol    Injectable 2 milliGRAM(s) IV Push every 6 hours PRN Agitation  sodium chloride 0.65% Nasal 1 Spray(s) Both Nostrils two times a day PRN Nasal Congestion      CAPILLARY BLOOD GLUCOSE      POCT Blood Glucose.: 331 mg/dL (25 Dec 2021 08:47)  POCT Blood Glucose.: 195 mg/dL (25 Dec 2021 02:01)  POCT Blood Glucose.: 145 mg/dL (24 Dec 2021 22:08)  POCT Blood Glucose.: 96 mg/dL (24 Dec 2021 21:45)  POCT Blood Glucose.: 53 mg/dL (24 Dec 2021 21:19)  POCT Blood Glucose.: 44 mg/dL (24 Dec 2021 21:16)  POCT Blood Glucose.: 83 mg/dL (24 Dec 2021 18:22)  POCT Blood Glucose.: 237 mg/dL (24 Dec 2021 12:30)    I&O's Summary    24 Dec 2021 07:01  -  25 Dec 2021 07:00  --------------------------------------------------------  IN: 300 mL / OUT: 1150 mL / NET: -850 mL        PHYSICAL EXAM:  Vital Signs Last 24 Hrs  T(C): 37.2 (25 Dec 2021 06:30), Max: 37.2 (25 Dec 2021 06:30)  T(F): 98.9 (25 Dec 2021 06:30), Max: 98.9 (25 Dec 2021 06:30)  HR: 82 (25 Dec 2021 06:30) (82 - 95)  BP: 152/71 (25 Dec 2021 06:30) (130/61 - 152/71)  BP(mean): --  RR: 18 (25 Dec 2021 06:30) (18 - 18)  SpO2: 98% (25 Dec 2021 06:30) (97% - 98%)    ____________________  PHYSICAL EXAM:  · CONSTITUTIONAL:	Well-developed, well nourished  · NECK:	No bruits; no thyromegaly. No JVD  ·RESPIRATORY:   Clear to auscultation. Good air movement.  no rales,rhonchi or wheeze. No increased work of breathing.   · CARDIOVASCULAR	RRR  no m/r/g  . GASTROINTESTINAL:  Soft, non tender. No organomegaly. No guarding.  . GENITOURINARY:  No suprapubic tenderness. No costrovertrebral angle tenderness.   . EXTREMITIES: Warm. No cyanosis, clubbing or edema. DP/PT pulses intact.  · NEUROLOGICAL:   alert and oriented x 3; 5/5 strength in b/l extremities. No sensory deficits.   · SKIN:	No lesions; no rash  . LYMPH NODES:	No lymphadedenopathy  · MUSCULOSKELETAL:   No calf tenderness  no joint swelling    LABS:                        11.6   11.61 )-----------( 149      ( 25 Dec 2021 06:52 )             37.5     12-25    136  |  101  |  18  ----------------------------<  341<H>  4.7   |  25  |  0.72    Ca    8.5      25 Dec 2021 06:52  Phos  2.6     12-25  Mg     2.00     12-25    TPro  5.1<L>  /  Alb  2.6<L>  /  TBili  0.8  /  DBili  x   /  AST  22  /  ALT  5   /  AlkPhos  112  12-25

## 2021-12-25 NOTE — PROGRESS NOTE ADULT - ATTENDING COMMENTS
recurrent post prandial hypoglycemia   dec premeal to 5  change synthroid to po  Endo on insulin titration  dc when FS stable

## 2021-12-25 NOTE — PROGRESS NOTE ADULT - PROBLEM SELECTOR PLAN 3
Patient has Creon on med rec with meals and snacks, likely in the setting of pancreatic insufficiency due to prolonged diabetes.    PLAN  - c/w Creon 78215 units 2 capsules w/ breakfast, lunch, dinner  - c/w Creon 12624 units 1 capsule w/ snacks

## 2021-12-25 NOTE — PROGRESS NOTE ADULT - PROBLEM SELECTOR PLAN 1
#Diabetes with DKA  - Patient with hx of DM1, non-complaint with basal bolus at home, A1C 9.4% on admission  - on admission glucose 1029, pH 7.13, bicarb 8, AG 37, BHB 9, K 6.0   - patient received insulin drip at 9units per hour while in MICU, adjusted per DKA protocol  - s/p 2L of fluids, now on D5 until patient eats  - PO diet was started, patient received 18 of Lantus in the AM  - sugars elevated in the AM, but improving throughout the day    PLAN  - ctm sugars with meals and bedtime - goal 140 - 180; elevated overnight, had overcorrected, did not receive PM dose of Admelog  - endocrinology recommendations appreciated  - Lantus 22 sq for bedtime basal insulin  - Admelog 8 TID before meals  - LDCS for both meals and bedtime  - DISCHARGE: will not d/c on a pump.  pt is willing to be discharged on insulin pens.  She knows how to inject - dose TBD.

## 2021-12-25 NOTE — PROGRESS NOTE ADULT - PROBLEM SELECTOR PLAN 8
#AZALEA   - patient with BUN 63, Cr 2.14 on admission (baseline creatinine 0.8),   - likely pre-renal in the setting of DKA   - will treat DKA and continue to trend BUN and creatinine  #Anion gap metabolic acidosis   - on admission pH 7.13, bicarb 8, AG 37   - likely multifactorial in origin with DKA, uremia and lactate contributing   - will continue to trend lactate and treat DKA and pre-renal AZALEA  - gap is 13 --> 10 now   #hypophosphatemia  - replete 12/22    PLAN  - Cr at 0.78 today, back to baseline  - gap has closed to 9  - electrolytes wnl  - trend BMP and replete as required

## 2021-12-25 NOTE — PROGRESS NOTE ADULT - SUBJECTIVE AND OBJECTIVE BOX
Chief Complaint: Type 1 DM    History: had a hypoglycemia event yesterday, drank several juice afterwards  tolerating po    MEDICATIONS  (STANDING):  atorvastatin 40 milliGRAM(s) Oral at bedtime  carbidopa/levodopa  25/100 1 Tablet(s) Oral three times a day  chlorhexidine 2% Cloths 1 Application(s) Topical daily  dextrose 40% Gel 15 Gram(s) Oral once  dextrose 5%. 1000 milliLiter(s) (100 mL/Hr) IV Continuous <Continuous>  dextrose 5%. 1000 milliLiter(s) (50 mL/Hr) IV Continuous <Continuous>  dextrose 50% Injectable 25 Gram(s) IV Push once  dextrose 50% Injectable 12.5 Gram(s) IV Push once  dextrose 50% Injectable 25 Gram(s) IV Push once  escitalopram 10 milliGRAM(s) Oral daily  FIRST- Mouthwash  BLM 10 milliLiter(s) Swish and Spit every 12 hours  glucagon  Injectable 1 milliGRAM(s) IntraMuscular once  heparin   Injectable 7500 Unit(s) SubCutaneous every 8 hours  insulin glargine Injectable (LANTUS) 22 Unit(s) SubCutaneous every morning  insulin lispro (ADMELOG) corrective regimen sliding scale   SubCutaneous at bedtime  insulin lispro (ADMELOG) corrective regimen sliding scale   SubCutaneous three times a day before meals  insulin lispro Injectable (ADMELOG) 5 Unit(s) SubCutaneous three times a day before meals  levothyroxine 125 MICROGram(s) Oral daily  loratadine 10 milliGRAM(s) Oral daily  LORazepam     Tablet 0.5 milliGRAM(s) Oral at bedtime  losartan 100 milliGRAM(s) Oral daily  pancrelipase  (CREON 36,000 Lipase Units) 2 Capsule(s) Oral three times a day with meals  pantoprazole    Tablet 40 milliGRAM(s) Oral before breakfast  rasagiline Tablet 1 milliGRAM(s) Oral daily    MEDICATIONS  (PRN):  acetaminophen     Tablet .. 650 milliGRAM(s) Oral every 6 hours PRN Temp greater or equal to 38C (100.4F), Mild Pain (1 - 3)  haloperidol    Injectable 2 milliGRAM(s) IV Push every 6 hours PRN Agitation  sodium chloride 0.65% Nasal 1 Spray(s) Both Nostrils two times a day PRN Nasal Congestion      Allergies    Ceclor (Unknown)  iodine containing compounds (Unknown)  shellfish (Unknown)    Intolerances      Review of Systems:    ALL OTHER SYSTEMS REVIEWED AND NEGATIVE      PHYSICAL EXAM:  VITALS: T(C): 37 (12-25-21 @ 13:47)  T(F): 98.6 (12-25-21 @ 13:47), Max: 98.9 (12-25-21 @ 06:30)  HR: 78 (12-25-21 @ 13:47) (78 - 95)  BP: 135/73 (12-25-21 @ 13:47) (130/61 - 152/71)  RR:  (17 - 18)  SpO2:  (97% - 99%)  Wt(kg): --  GENERAL: NAD, well-groomed, well-developed  EYES: No proptosis, no lid lag, anicteric  HEENT:  Atraumatic, Normocephalic, moist mucous membranes  RESPIRATORY: nonlabored respirations, no wheezing  PSYCH: Alert and oriented x 3, normal affect, normal mood    CAPILLARY BLOOD GLUCOSE      POCT Blood Glucose.: 256 mg/dL (25 Dec 2021 12:22)  POCT Blood Glucose.: 331 mg/dL (25 Dec 2021 08:47)  POCT Blood Glucose.: 195 mg/dL (25 Dec 2021 02:01)  POCT Blood Glucose.: 145 mg/dL (24 Dec 2021 22:08)  POCT Blood Glucose.: 96 mg/dL (24 Dec 2021 21:45)  POCT Blood Glucose.: 53 mg/dL (24 Dec 2021 21:19)  POCT Blood Glucose.: 44 mg/dL (24 Dec 2021 21:16)  POCT Blood Glucose.: 83 mg/dL (24 Dec 2021 18:22)      12-25    136  |  101  |  18  ----------------------------<  341<H>  4.7   |  25  |  0.72    EGFR if : 96  EGFR if non : 83    Ca    8.5      12-25  Mg     2.00     12-25  Phos  2.6     12-25    TPro  5.1<L>  /  Alb  2.6<L>  /  TBili  0.8  /  DBili  x   /  AST  22  /  ALT  5   /  AlkPhos  112  12-25      A1C with Estimated Average Glucose Result: 9.4 % (12-20-21 @ 19:29)      Thyroid Function Tests:  12-20 @ 23:52 TSH 0.49 FreeT4 -- T3 -- Anti TPO -- Anti Thyroglobulin Ab -- TSI --

## 2021-12-25 NOTE — PROGRESS NOTE ADULT - PROBLEM SELECTOR PLAN 2
#HTN   - blood pressure currently well controlled   - On losartan 100 mg at home    PLAN  - pressures have been stable  - c/w Losartan 100mg qdaily

## 2021-12-26 ENCOUNTER — TRANSCRIPTION ENCOUNTER (OUTPATIENT)
Age: 71
End: 2021-12-26

## 2021-12-26 LAB
ALBUMIN SERPL ELPH-MCNC: 2.7 G/DL — LOW (ref 3.3–5)
ALP SERPL-CCNC: 128 U/L — HIGH (ref 40–120)
ALT FLD-CCNC: 7 U/L — SIGNIFICANT CHANGE UP (ref 4–33)
ANION GAP SERPL CALC-SCNC: 16 MMOL/L — HIGH (ref 7–14)
AST SERPL-CCNC: 25 U/L — SIGNIFICANT CHANGE UP (ref 4–32)
BILIRUB SERPL-MCNC: 0.8 MG/DL — SIGNIFICANT CHANGE UP (ref 0.2–1.2)
BUN SERPL-MCNC: 17 MG/DL — SIGNIFICANT CHANGE UP (ref 7–23)
CALCIUM SERPL-MCNC: 8.8 MG/DL — SIGNIFICANT CHANGE UP (ref 8.4–10.5)
CHLORIDE SERPL-SCNC: 97 MMOL/L — LOW (ref 98–107)
CO2 SERPL-SCNC: 19 MMOL/L — LOW (ref 22–31)
CREAT SERPL-MCNC: 0.72 MG/DL — SIGNIFICANT CHANGE UP (ref 0.5–1.3)
CULTURE RESULTS: SIGNIFICANT CHANGE UP
CULTURE RESULTS: SIGNIFICANT CHANGE UP
GLUCOSE BLDC GLUCOMTR-MCNC: 104 MG/DL — HIGH (ref 70–99)
GLUCOSE BLDC GLUCOMTR-MCNC: 207 MG/DL — HIGH (ref 70–99)
GLUCOSE BLDC GLUCOMTR-MCNC: 227 MG/DL — HIGH (ref 70–99)
GLUCOSE BLDC GLUCOMTR-MCNC: 392 MG/DL — HIGH (ref 70–99)
GLUCOSE SERPL-MCNC: 352 MG/DL — HIGH (ref 70–99)
HCT VFR BLD CALC: 39.2 % — SIGNIFICANT CHANGE UP (ref 34.5–45)
HGB BLD-MCNC: 12.4 G/DL — SIGNIFICANT CHANGE UP (ref 11.5–15.5)
MAGNESIUM SERPL-MCNC: 2 MG/DL — SIGNIFICANT CHANGE UP (ref 1.6–2.6)
MCHC RBC-ENTMCNC: 29.1 PG — SIGNIFICANT CHANGE UP (ref 27–34)
MCHC RBC-ENTMCNC: 31.6 GM/DL — LOW (ref 32–36)
MCV RBC AUTO: 92 FL — SIGNIFICANT CHANGE UP (ref 80–100)
NRBC # BLD: 0 /100 WBCS — SIGNIFICANT CHANGE UP
NRBC # FLD: 0 K/UL — SIGNIFICANT CHANGE UP
PHOSPHATE SERPL-MCNC: 2.1 MG/DL — LOW (ref 2.5–4.5)
PLATELET # BLD AUTO: 152 K/UL — SIGNIFICANT CHANGE UP (ref 150–400)
POTASSIUM SERPL-MCNC: 4.8 MMOL/L — SIGNIFICANT CHANGE UP (ref 3.5–5.3)
POTASSIUM SERPL-SCNC: 4.8 MMOL/L — SIGNIFICANT CHANGE UP (ref 3.5–5.3)
PROT SERPL-MCNC: 5.3 G/DL — LOW (ref 6–8.3)
RBC # BLD: 4.26 M/UL — SIGNIFICANT CHANGE UP (ref 3.8–5.2)
RBC # FLD: 16.8 % — HIGH (ref 10.3–14.5)
SODIUM SERPL-SCNC: 132 MMOL/L — LOW (ref 135–145)
SPECIMEN SOURCE: SIGNIFICANT CHANGE UP
SPECIMEN SOURCE: SIGNIFICANT CHANGE UP
WBC # BLD: 12.55 K/UL — HIGH (ref 3.8–10.5)
WBC # FLD AUTO: 12.55 K/UL — HIGH (ref 3.8–10.5)

## 2021-12-26 PROCEDURE — 99232 SBSQ HOSP IP/OBS MODERATE 35: CPT | Mod: GC

## 2021-12-26 PROCEDURE — 99232 SBSQ HOSP IP/OBS MODERATE 35: CPT

## 2021-12-26 RX ORDER — POTASSIUM PHOSPHATE, MONOBASIC POTASSIUM PHOSPHATE, DIBASIC 236; 224 MG/ML; MG/ML
30 INJECTION, SOLUTION INTRAVENOUS ONCE
Refills: 0 | Status: COMPLETED | OUTPATIENT
Start: 2021-12-26 | End: 2021-12-26

## 2021-12-26 RX ORDER — FLUTICASONE PROPIONATE 50 MCG
1 SPRAY, SUSPENSION NASAL
Refills: 0 | Status: DISCONTINUED | OUTPATIENT
Start: 2021-12-26 | End: 2021-12-31

## 2021-12-26 RX ORDER — INSULIN LISPRO 100/ML
8 VIAL (ML) SUBCUTANEOUS
Refills: 0 | Status: DISCONTINUED | OUTPATIENT
Start: 2021-12-26 | End: 2021-12-27

## 2021-12-26 RX ORDER — INSULIN LISPRO 100/ML
8 VIAL (ML) SUBCUTANEOUS
Refills: 0 | Status: DISCONTINUED | OUTPATIENT
Start: 2021-12-26 | End: 2021-12-30

## 2021-12-26 RX ORDER — INSULIN LISPRO 100/ML
5 VIAL (ML) SUBCUTANEOUS
Refills: 0 | Status: DISCONTINUED | OUTPATIENT
Start: 2021-12-26 | End: 2021-12-27

## 2021-12-26 RX ORDER — INSULIN GLARGINE 100 [IU]/ML
30 INJECTION, SOLUTION SUBCUTANEOUS
Refills: 0 | Status: DISCONTINUED | OUTPATIENT
Start: 2021-12-26 | End: 2021-12-29

## 2021-12-26 RX ADMIN — RASAGILINE 1 MILLIGRAM(S): 0.5 TABLET ORAL at 13:12

## 2021-12-26 RX ADMIN — LORATADINE 10 MILLIGRAM(S): 10 TABLET ORAL at 13:17

## 2021-12-26 RX ADMIN — DIPHENHYDRAMINE HYDROCHLORIDE AND LIDOCAINE HYDROCHLORIDE AND ALUMINUM HYDROXIDE AND MAGNESIUM HYDRO 10 MILLILITER(S): KIT at 06:45

## 2021-12-26 RX ADMIN — Medication 6 UNIT(S): at 09:42

## 2021-12-26 RX ADMIN — Medication 2: at 13:09

## 2021-12-26 RX ADMIN — CARBIDOPA AND LEVODOPA 1 TABLET(S): 25; 100 TABLET ORAL at 13:13

## 2021-12-26 RX ADMIN — CARBIDOPA AND LEVODOPA 1 TABLET(S): 25; 100 TABLET ORAL at 06:47

## 2021-12-26 RX ADMIN — PANTOPRAZOLE SODIUM 40 MILLIGRAM(S): 20 TABLET, DELAYED RELEASE ORAL at 06:47

## 2021-12-26 RX ADMIN — HEPARIN SODIUM 7500 UNIT(S): 5000 INJECTION INTRAVENOUS; SUBCUTANEOUS at 13:11

## 2021-12-26 RX ADMIN — LOSARTAN POTASSIUM 100 MILLIGRAM(S): 100 TABLET, FILM COATED ORAL at 06:46

## 2021-12-26 RX ADMIN — Medication 2 CAPSULE(S): at 09:37

## 2021-12-26 RX ADMIN — Medication 1 SPRAY(S): at 18:59

## 2021-12-26 RX ADMIN — HEPARIN SODIUM 7500 UNIT(S): 5000 INJECTION INTRAVENOUS; SUBCUTANEOUS at 06:46

## 2021-12-26 RX ADMIN — Medication 0.5 MILLIGRAM(S): at 22:47

## 2021-12-26 RX ADMIN — Medication 125 MICROGRAM(S): at 06:47

## 2021-12-26 RX ADMIN — Medication 2 CAPSULE(S): at 13:12

## 2021-12-26 RX ADMIN — Medication 5 UNIT(S): at 13:09

## 2021-12-26 RX ADMIN — Medication 2: at 18:12

## 2021-12-26 RX ADMIN — Medication 8 UNIT(S): at 18:12

## 2021-12-26 RX ADMIN — ESCITALOPRAM OXALATE 10 MILLIGRAM(S): 10 TABLET, FILM COATED ORAL at 13:12

## 2021-12-26 RX ADMIN — INSULIN GLARGINE 24 UNIT(S): 100 INJECTION, SOLUTION SUBCUTANEOUS at 09:44

## 2021-12-26 RX ADMIN — ATORVASTATIN CALCIUM 40 MILLIGRAM(S): 80 TABLET, FILM COATED ORAL at 22:48

## 2021-12-26 RX ADMIN — CARBIDOPA AND LEVODOPA 1 TABLET(S): 25; 100 TABLET ORAL at 22:47

## 2021-12-26 RX ADMIN — DIPHENHYDRAMINE HYDROCHLORIDE AND LIDOCAINE HYDROCHLORIDE AND ALUMINUM HYDROXIDE AND MAGNESIUM HYDRO 10 MILLILITER(S): KIT at 18:58

## 2021-12-26 RX ADMIN — CHLORHEXIDINE GLUCONATE 1 APPLICATION(S): 213 SOLUTION TOPICAL at 13:13

## 2021-12-26 RX ADMIN — POTASSIUM PHOSPHATE, MONOBASIC POTASSIUM PHOSPHATE, DIBASIC 83.33 MILLIMOLE(S): 236; 224 INJECTION, SOLUTION INTRAVENOUS at 10:37

## 2021-12-26 RX ADMIN — Medication 2 CAPSULE(S): at 18:13

## 2021-12-26 RX ADMIN — Medication 5: at 09:41

## 2021-12-26 RX ADMIN — HEPARIN SODIUM 7500 UNIT(S): 5000 INJECTION INTRAVENOUS; SUBCUTANEOUS at 22:47

## 2021-12-26 NOTE — PROGRESS NOTE ADULT - PROBLEM SELECTOR PLAN 3
Patient has Creon on med rec with meals and snacks, likely in the setting of pancreatic insufficiency due to prolonged diabetes.    PLAN  - c/w Creon 79402 units 2 capsules w/ breakfast, lunch, dinner  - c/w Creon 56857 units 1 capsule w/ snacks

## 2021-12-26 NOTE — PROGRESS NOTE ADULT - PROBLEM SELECTOR PLAN 8
#AZALEA   - patient with BUN 63, Cr 2.14 on admission (baseline creatinine 0.8),   - likely pre-renal in the setting of DKA   - will treat DKA and continue to trend BUN and creatinine  #Anion gap metabolic acidosis   - on admission pH 7.13, bicarb 8, AG 37   - likely multifactorial in origin with DKA, uremia and lactate contributing   - will continue to trend lactate and treat DKA and pre-renal AZALEA  - gap is 13 --> 10 now   #hypophosphatemia  - replete 12/22    PLAN  - Cr at 0.78 today, back to baseline  - gap has closed to 9  - electrolytes wnl  - trend BMP and replete as required #AZALEA   - patient with BUN 63, Cr 2.14 on admission (baseline creatinine 0.8),   - likely pre-renal in the setting of DKA   - will treat DKA and continue to trend BUN and creatinine  #Anion gap metabolic acidosis   - on admission pH 7.13, bicarb 8, AG 37   - likely multifactorial in origin with DKA, uremia and lactate contributing   - will continue to trend lactate and treat DKA and pre-renal AZALEA  - gap is 13 --> 10 now   #hypophosphatemia  - replete 12/22    PLAN  - Cr at 0.72 today, back to baseline  - gap increased today to 16, i/s/o increase sugar, Cr still appropriate  - electrolytes wnl  - trend BMP and replete as required    -- repleted phosphorus today

## 2021-12-26 NOTE — DISCHARGE NOTE PROVIDER - CARE PROVIDERS DIRECT ADDRESSES
,DirectAddress_Unknown ,DirectAddress_Unknown,[email or web address]

## 2021-12-26 NOTE — DISCHARGE NOTE PROVIDER - NSDCCPTREATMENT_GEN_ALL_CORE_FT
PRINCIPAL PROCEDURE  Procedure: XR cine and video recording for swallow function of pharynx and esophagus  Findings and Treatment: PROCEDURE DATE:  12/28/2021    INTERPRETATION:  CLINICAL INFORMATION: Dysphagia.  TECHNIQUE: Various foods with different consistencies were mixed with   barium and patient was asked to consume them under direct fluoroscopic   evaluation.  The study was performed in cooperation with the speech   pathologist.  FLUOROSCOPY: Time: 1.8 minutes  COMPARISON:  None available.  IMPRESSION: Episode of trace laryngeal penetration for thin liquids via   consecutive cup sip drinking without complete retrieval to the level of   the vocal folds. No aspiration.  A full report will be issued by the   department of speech and hearing.  --- End of Report ---

## 2021-12-26 NOTE — PROGRESS NOTE ADULT - ASSESSMENT
71 year old woman with T1DM, uncontrolled, previously on insulin pump, admitted with DKA    1) T1DM, uncontrolled  DKA resolved  Pt with variable glucose values, has had continued fasting hyperglycemia, with hypoglycemia after lunch but hyperglycemia after breakfast and dinner.    Recommend increasing lantus to 30 units.   Decrease pre-lunch admelog to 5 units.  Increase pre-breakfast and dinner admelog to 8 units  Continue admelog correction scale  Inpatient glucose goal 100 - 180 mg/dl    Plan to discharge on basal bolus insulin, if leaving today, then use doses above, switch to Toujeo for basal insulin and Apidra for prandial as she had been using them in the past  She was having difficulty using the insulin pump, particularly filling the reservoir with insertion site changes, would like to continue on insulin injections  She will follow up with Dr Sophia Terrell post discharge    2) Hyperlipidemia   - continue atorvastatin    3) Thyroid cancer, hypothyroidism  Continue levothyroxine 125 micrograms po daily    Discussed with primary team    Pat Cano MD  pager  on 12/26  Diabetes team: [phone] business hours  [phone] night/weekend

## 2021-12-26 NOTE — PROGRESS NOTE ADULT - SUBJECTIVE AND OBJECTIVE BOX
Diabetes  Follow up note    Interval History:  Pt feeling better.  Reports good appetite, eating hospital meals    MEDICATIONS  (STANDING):  atorvastatin 40 milliGRAM(s) Oral at bedtime  carbidopa/levodopa  25/100 1 Tablet(s) Oral three times a day  chlorhexidine 2% Cloths 1 Application(s) Topical daily  dextrose 40% Gel 15 Gram(s) Oral once  dextrose 5%. 1000 milliLiter(s) (50 mL/Hr) IV Continuous <Continuous>  dextrose 5%. 1000 milliLiter(s) (100 mL/Hr) IV Continuous <Continuous>  dextrose 50% Injectable 25 Gram(s) IV Push once  dextrose 50% Injectable 12.5 Gram(s) IV Push once  dextrose 50% Injectable 25 Gram(s) IV Push once  escitalopram 10 milliGRAM(s) Oral daily  FIRST- Mouthwash  BLM 10 milliLiter(s) Swish and Spit every 12 hours  fluticasone propionate 50 MICROgram(s)/spray Nasal Spray 1 Spray(s) Both Nostrils two times a day  glucagon  Injectable 1 milliGRAM(s) IntraMuscular once  heparin   Injectable 7500 Unit(s) SubCutaneous every 8 hours  insulin glargine Injectable (LANTUS) 30 Unit(s) SubCutaneous <User Schedule>  insulin lispro (ADMELOG) corrective regimen sliding scale   SubCutaneous three times a day before meals  insulin lispro (ADMELOG) corrective regimen sliding scale   SubCutaneous at bedtime  insulin lispro Injectable (ADMELOG) 6 Unit(s) SubCutaneous before meals three times daily  levothyroxine 125 MICROGram(s) Oral daily  loratadine 10 milliGRAM(s) Oral daily  LORazepam     Tablet 0.5 milliGRAM(s) Oral at bedtime  losartan 100 milliGRAM(s) Oral daily  pancrelipase  (CREON 36,000 Lipase Units) 2 Capsule(s) Oral three times a day with meals  pantoprazole    Tablet 40 milliGRAM(s) Oral before breakfast  rasagiline Tablet 1 milliGRAM(s) Oral daily    MEDICATIONS  (PRN):  acetaminophen     Tablet .. 650 milliGRAM(s) Oral every 6 hours PRN Temp greater or equal to 38C (100.4F), Mild Pain (1 - 3)  haloperidol    Injectable 2 milliGRAM(s) IV Push every 6 hours PRN Agitation  sodium chloride 0.65% Nasal 1 Spray(s) Both Nostrils two times a day PRN Nasal Congestion      Allergies    Ceclor (Unknown)  iodine containing compounds (Unknown)  shellfish (Unknown)    Intolerances          PHYSICAL EXAM:  VITALS: T(C): 36.2 (12-26-21 @ 06:30)  T(F): 97.2 (12-26-21 @ 06:30), Max: 98.9 (12-25-21 @ 21:55)  HR: 91 (12-26-21 @ 06:30) (78 - 91)  BP: 146/86 (12-26-21 @ 06:30) (135/73 - 146/86)  RR:  (17 - 18)  SpO2:  (95% - 100%)  GENERAL: Sitting up in bed in NAD,   EYES: No proptosis,  HEENT:  Atraumatic, Normocephalic,   RESPIRATORY: Clear to auscultation bilaterally  CARDIOVASCULAR: Regular rhythm;   GI: Soft, nontender, non distended, normal bowel sounds        POCT Blood Glucose.: 227 mg/dL (12-26-21 @ 13:01)  POCT Blood Glucose.: 392 mg/dL (12-26-21 @ 09:34)  POCT Blood Glucose.: 205 mg/dL (12-25-21 @ 22:38)  POCT Blood Glucose.: 91 mg/dL (12-25-21 @ 17:55)  POCT Blood Glucose.: 256 mg/dL (12-25-21 @ 12:22)  POCT Blood Glucose.: 331 mg/dL (12-25-21 @ 08:47)  POCT Blood Glucose.: 195 mg/dL (12-25-21 @ 02:01)  POCT Blood Glucose.: 145 mg/dL (12-24-21 @ 22:08)  POCT Blood Glucose.: 96 mg/dL (12-24-21 @ 21:45)  POCT Blood Glucose.: 53 mg/dL (12-24-21 @ 21:19)  POCT Blood Glucose.: 44 mg/dL (12-24-21 @ 21:16)  POCT Blood Glucose.: 83 mg/dL (12-24-21 @ 18:22)  POCT Blood Glucose.: 237 mg/dL (12-24-21 @ 12:30)  POCT Blood Glucose.: 346 mg/dL (12-24-21 @ 08:57)  POCT Blood Glucose.: 248 mg/dL (12-23-21 @ 21:55)  POCT Blood Glucose.: 125 mg/dL (12-23-21 @ 19:12)  POCT Blood Glucose.: 110 mg/dL (12-23-21 @ 19:08)  POCT Blood Glucose.: 96 mg/dL (12-23-21 @ 19:03)  POCT Blood Glucose.: 122 mg/dL (12-23-21 @ 18:57)  POCT Blood Glucose.: 93 mg/dL (12-23-21 @ 18:39)  POCT Blood Glucose.: 50 mg/dL (12-23-21 @ 18:19)  POCT Blood Glucose.: 51 mg/dL (12-23-21 @ 18:00)        12-26    132<L>  |  97<L>  |  17  ----------------------------<  352<H>  4.8   |  19<L>  |  0.72    EGFR if : 96  EGFR if non : 83    Ca    8.8      12-26  Mg     2.00     12-26  Phos  2.1     12-26    TPro  5.3<L>  /  Alb  2.7<L>  /  TBili  0.8  /  DBili  x   /  AST  25  /  ALT  7   /  AlkPhos  128<H>  12-26        A1C with Estimated Average Glucose Result: 9.4 % (12-20-21 @ 19:29)

## 2021-12-26 NOTE — PROGRESS NOTE ADULT - PROBLEM SELECTOR PLAN 10
#SIRS   - patient with leukocytosis and tachycardia on admission   - UA negative for infection, CXR clear lungs   - follow up RVP , COVID (neg), and blood cultures   - currently monitoring off antibiotics, higher suspicion for viral illness     PLAN  - urine culture is pending #SIRS   - patient with leukocytosis and tachycardia on admission   - UA negative for infection, CXR clear lungs   - follow up RVP , COVID (neg), and blood cultures   - currently monitoring off antibiotics, higher suspicion for viral illness     PLAN  - urine culture is pending    -- greater than 3 organisms, possibly contaminated

## 2021-12-26 NOTE — DISCHARGE NOTE PROVIDER - NSFOLLOWUPCLINICS_GEN_ALL_ED_FT
[facility]  Endocrinology  [address] [place] [address]  Phone: [phone]  Fax:

## 2021-12-26 NOTE — PROGRESS NOTE ADULT - ATTENDING COMMENTS
no further hypoglycemic events - premeal dec to 6 units TID and basal up to 24, fs better controlled   repeat ucx contaminated - pt asymptomatic - no need to treat  stable for dc if no objection by endo  plan SANDRA no further hypoglycemic events - premeal dec to 6 units TID and basal up to 24, but missed Lantus hence why hyperglycemic this AM - suspect fs will be controlled otherwise   repeat ucx contaminated - pt asymptomatic - no need to treat  stable for dc if no objection by endo  plan SANDRA

## 2021-12-26 NOTE — DISCHARGE NOTE PROVIDER - NSDCMRMEDTOKEN_GEN_ALL_CORE_FT
atorvastatin 40 mg oral tablet: 1 tab(s) orally once a day  carbidopa-levodopa 25 mg-100 mg oral tablet: 1  orally 3 times a day  Creon 36,000 units oral delayed release capsule: 2 cap(s) orally with meals, 1 w/ snacks  HumaLOG 100 units/mL subcutaneous solution: 50 unit(s) subcutaneous via insulin pump  KlonoPIN 0.5 mg oral tablet: 1 tab(s) orally once a day  levocetirizine 5 mg oral tablet: 1 tab(s) orally once a day (in the evening)  Lexapro 10 mg oral tablet: 1 tab(s) orally once a day  losartan 100 mg oral tablet: 1 tab(s) orally once a day  pantoprazole 40 mg oral delayed release tablet: 1 tab(s) orally once a day  rasagiline 1 mg oral tablet: 1 tab(s) orally once a day  Tirosint 125 mcg (0.125 mg) oral capsule: 1 cap(s) orally once a day  Viberzi 75 mg oral tablet: 1 tab(s) orally 2 times a day  Vitamin D3 400 intl units (10 mcg) oral tablet: 1 tab(s) orally once a day   atorvastatin 40 mg oral tablet: 1 tab(s) orally once a day  carbidopa-levodopa 25 mg-100 mg oral tablet: 1  orally 3 times a day  Creon 36,000 units oral delayed release capsule: 2 cap(s) orally 3 times a day  KlonoPIN 0.5 mg oral tablet: 1 tab(s) orally once a day  levocetirizine 5 mg oral tablet: 1 tab(s) orally once a day (in the evening)  Lexapro 10 mg oral tablet: 1 tab(s) orally once a day  losartan 100 mg oral tablet: 1 tab(s) orally once a day  pantoprazole 40 mg oral delayed release tablet: 1 tab(s) orally once a day  rasagiline 1 mg oral tablet: 1 tab(s) orally once a day  Tirosint 125 mcg (0.125 mg) oral capsule: 1 cap(s) orally once a day  Viberzi 75 mg oral tablet: 1 tab(s) orally 2 times a day  Vitamin D3 400 intl units (10 mcg) oral tablet: 1 tab(s) orally once a day

## 2021-12-26 NOTE — DISCHARGE NOTE PROVIDER - PROVIDER TOKENS
PROVIDER:[TOKEN:[[id number]],ESTABLISHEDPATIENT:[T]] PROVIDER:[TOKEN:[[id number]],ESTABLISHEDPATIENT:[T]],PROVIDER:[TOKEN:[[id number]],FOLLOWUP:[2 weeks]]

## 2021-12-26 NOTE — DISCHARGE NOTE PROVIDER - CARE PROVIDER_API CALL
NELSON BETANCOURT  Internal Medicine  [address]  [place] [address]  Phone: [phone]  Fax: [phone]  Established Patient  Follow Up Time:    NELSON BETANCOURT  Internal Medicine  [address] [facility] [address]  [place] [address]  Phone: [phone]  Fax: [phone]  Established Patient  Follow Up Time:     Sophia Terrell)  EndocrinologyMetabDiabetes; Internal Medicine  [address] [facility] [facility] [address]  [place] [id number]  Phone: [phone]  Fax: [phone]  Follow Up Time: 2 weeks

## 2021-12-26 NOTE — PROGRESS NOTE ADULT - PROBLEM SELECTOR PLAN 1
#Diabetes with DKA  - Patient with hx of DM1, non-complaint with basal bolus at home, A1C 9.4% on admission  - on admission glucose 1029, pH 7.13, bicarb 8, AG 37, BHB 9, K 6.0   - patient received insulin drip at 9units per hour while in MICU, adjusted per DKA protocol  - s/p 2L of fluids, now on D5 until patient eats  - PO diet was started, patient received 18 of Lantus in the AM  - sugars elevated in the AM, but improving throughout the day    PLAN  - ctm sugars with meals and bedtime - goal 140 - 180; elevated overnight, had overcorrected, did not receive PM dose of Admelog  - endocrinology recommendations appreciated  - Lantus 22 sq for bedtime basal insulin  - Admelog 8 TID before meals  - LDCS for both meals and bedtime  - DISCHARGE: will not d/c on a pump.  pt is willing to be discharged on insulin pens.  She knows how to inject - dose TBD. #Diabetes with DKA  - Patient with hx of DM1, non-complaint with basal bolus at home, A1C 9.4% on admission  - on admission glucose 1029, pH 7.13, bicarb 8, AG 37, BHB 9, K 6.0   - patient received insulin drip at 9units per hour while in MICU, adjusted per DKA protocol  - s/p 2L of fluids, now on D5 until patient eats  - PO diet was started, patient received 18 of Lantus in the AM  - sugars elevated in the AM, but improving throughout the day    PLAN  - ctm sugars with meals and bedtime - goal 140 - 180; elevated overnight  - endocrinology recommendations appreciated  - Lantus 30 sq for bedtime basal insulin  - Admelog 8 before breakfast, 5 before lunch, and 8 before dinner    -- these are discharge recs as well and will follow with endocrine outpatient  - LDCS for both meals and bedtime  - DISCHARGE: will not d/c on a pump.  pt is willing to be discharged on insulin pens.  She knows how to inject - dose TBD.

## 2021-12-26 NOTE — DISCHARGE NOTE PROVIDER - NSDCCPCAREPLAN_GEN_ALL_CORE_FT
PRINCIPAL DISCHARGE DIAGNOSIS  Diagnosis: DKA (diabetic ketoacidosis)  Assessment and Plan of Treatment: You came into the hospital because you went into a metabolic state known as diabetic ketoacidosis. This was likely in the setting of missing several doses of your insulin, which led your body to have too much high sugar and caused you symptoms. You were initially admitted to the ICU where you were started on the DKA protocol and given an insulin drip in order to rapidly correct your condition. As you improved, you were transitioned to the floors, where your insulin regimen was adjusted. You will NO LONGER be using the insulin pump. You will be taking Lantus 30 units every morning, with 8 units of Admelog before BREAKFAST AND DINNER, and 5 units of Admelog BEFORE LUNCH. The instructions will also be written in your script. It was recommended for you to go to rehab. You will be discharged to _____. Please continue to take all your medications as prescribed. Please continue to follow up with your primary care and endocrinologist. Please return to the hospital should you have increased fever > 100.4, increased nausea/vomiting, increased urinary frequency, shortness of breath, chest pain, or changes in your mental status.       PRINCIPAL DISCHARGE DIAGNOSIS  Diagnosis: DKA (diabetic ketoacidosis)  Assessment and Plan of Treatment: You came into the hospital because you went into a metabolic state known as diabetic ketoacidosis. This was likely in the setting of missing several doses of your insulin, which led your body to have too much high sugar and caused you symptoms. You were initially admitted to the ICU where you were started on the DKA protocol and given an insulin drip in order to rapidly correct your condition. As you improved, you were transitioned to the floors, where your insulin regimen was adjusted. You will NO LONGER be using the insulin pump. You will be taking Lantus 32 units every morning, with 8 units of Admelog before BREAKFAST AND 4 units of Admelog before DINNER. The instructions will also be written in your script. It was recommended for you to go to rehab. You will be discharged to [facility]. Please continue to take all your medications as prescribed. Please continue to follow up with your primary care and endocrinologist. Please return to the hospital should you have increased fever > 100.4, increased nausea/vomiting, increased urinary frequency, shortness of breath, chest pain, or changes in your mental status.      SECONDARY DISCHARGE DIAGNOSES  Diagnosis: Choking episode  Assessment and Plan of Treatment: You were found to have a choking episode while in the hospital. We ordered a cinesophagram that was read by our radiologists and speech pathologists who recommended you stay on a soft, bite-sized diet with thin liquids. You did not have any further episodes of choking after the change in diet.

## 2021-12-26 NOTE — DISCHARGE NOTE PROVIDER - HOSPITAL COURSE
71F hx of T1DM (since age 38), Parkinsons (diagnosed 2017), bladder incontinence, anxiety/depression, HTN, HLD, thyroid cancer s/p resection in 2000 now with hypothyroidism presents to the ED d/t SOB, nausea, vomiting and AMS that started Monday night. Patient is currently agitated and AOx0 so was unable to obtain information from her. Information was obtained from patient's son at bedside. Per son, patient was at her usual state of health until Monday morning, independent of all ADLs. Patient did not have fevers, chills, cough, chest pain, abdominal pain. Unclear exactly how many episodes of vomiting or the color of the vomitus. Pt did have a UTI 3 weeks ago and was started on abx - son unsure of which one. Pt was previously on a insulin pump but is now on basal-bolus. Per son, patient is not adherent with checking glucose checks and insulin dosing. Pt has never had DKA in the past. Of note patient has been taking Excedrin frequently for many years. Patient COVID vaccinated with Moderna (January initial, August booster). Cardiac cath two years ago within normal limits.     ED course: afebrile, , /65, RR 22, SpO2 98% RA. WBC 24.48, A1C 9.4, K 6.0, bicarb 8, AG 37. BUN 63, Cr 2.14 (baseline creatinine 0.8), glucose 1029, BHB >9.0. VBG pH 7.13, pCO2 27, lactate 6.0. Given haldol 2 for agitation, insulin drip at 9units/hour, and 3L fluids. UA with ketones, CXR with clear lungs     MICU Course:  Patient admitted to MICU with concern for DKA possibly in setting of infection versus incorrect medication compliance. Patient started on insulin drip and aggressive fluid repletion. Patient infectious work up returned negative. Endocrine following and patient eventual gap closed and transitioned to basal bolus with resumption of diet. Patient transferred to floor.    Floors Course:    Patient was started on sub q insulin regimen and diet was restarted. Patient sugars began to normalize and final endocrinology recs for discharge were given as basal bolus insulin as patient had gone into DKA for likely medication non-compliance. Patient has Parkinson's and severe arthritis, so prior to admission, had been struggling to use her insulin pump properly and had gone several days without her insulin. Sugars continued to fluctuate for the first few days until a final regimen of basal Lantus of 30 and premeal Admelog of 8 prior to breakfast, 5 prior to lunch and 8 prior to dinner.    Patient was otherwise determined to be hemodynamically stable and pharmacologically optimized for discharge.       71F hx of T1DM (since age 38), Parkinsons (diagnosed 2017), bladder incontinence, anxiety/depression, HTN, HLD, thyroid cancer s/p resection in 2000 now with hypothyroidism presents to the ED d/t SOB, nausea, vomiting and AMS that started Monday night. Patient is currently agitated and AOx0 so was unable to obtain information from her. Information was obtained from patient's son at bedside. Per son, patient was at her usual state of health until Monday morning, independent of all ADLs. Patient did not have fevers, chills, cough, chest pain, abdominal pain. Unclear exactly how many episodes of vomiting or the color of the vomitus. Pt did have a UTI 3 weeks ago and was started on abx - son unsure of which one. Pt was previously on a insulin pump but is now on basal-bolus. Per son, patient is not adherent with checking glucose checks and insulin dosing. Pt has never had DKA in the past. Of note patient has been taking Excedrin frequently for many years. Patient COVID vaccinated with Moderna (January initial, August booster). Cardiac cath two years ago within normal limits.     ED course: afebrile, , /65, RR 22, SpO2 98% RA. WBC 24.48, A1C 9.4, K 6.0, bicarb 8, AG 37. BUN 63, Cr 2.14 (baseline creatinine 0.8), glucose 1029, BHB >9.0. VBG pH 7.13, pCO2 27, lactate 6.0. Given haldol 2 for agitation, insulin drip at 9units/hour, and 3L fluids. UA with ketones, CXR with clear lungs     MICU Course:  Patient admitted to MICU with concern for DKA possibly in setting of infection versus incorrect medication compliance. Patient started on insulin drip and aggressive fluid repletion. Patient infectious work up returned negative. Endocrine following and patient eventual gap closed and transitioned to basal bolus with resumption of diet. Patient transferred to floor.    Floors Course:    Patient was started on sub q insulin regimen and diet was restarted. Patient sugars began to normalize and final endocrinology recs for discharge were given as basal bolus insulin as patient had gone into DKA for likely medication non-compliance. Patient has Parkinson's and severe arthritis, so prior to admission, had been struggling to use her insulin pump properly and had gone several days without her insulin. Sugars continued to fluctuate for the first few days until a final regimen of basal Lantus of 32 and premeal Admelog of 10 prior to breakfast and 4 prior to dinner.    She also had one episode of choking while on floors. Cinesophagram was ordered and recommendation was made for pt. to change diet to soft and bite sized with thin liquids. She also reported having mouth pain due to likely apthous ulcers that improved after several days.    Patient was otherwise determined to be hemodynamically stable and pharmacologically optimized for discharge.

## 2021-12-26 NOTE — PROGRESS NOTE ADULT - SUBJECTIVE AND OBJECTIVE BOX
Dale Valencia MD  PGY-1 Internal Medicine  Pager:  [phone] (ns) [phone] ([facility])  Available on Microsoft Teams  12-26-21 @ 06:30  _________________________________________________________________________________________________________________________________________    CC:      Patient is a 71y old  Female who presents with a chief complaint of Hyperglycemia (25 Dec 2021 15:21)        SUBJECTIVE:    NAEO.      _________________________________________________________________________________________________________________________________________    OBJECTIVE:    Vital Signs Last 24 Hrs  T(C): 37.2 (25 Dec 2021 21:55), Max: 37.2 (25 Dec 2021 21:55)  T(F): 98.9 (25 Dec 2021 21:55), Max: 98.9 (25 Dec 2021 21:55)  HR: 85 (25 Dec 2021 21:55) (78 - 85)  BP: 142/60 (25 Dec 2021 21:55) (135/73 - 142/60)  BP(mean): --  RR: 18 (25 Dec 2021 21:55) (17 - 18)  SpO2: 95% (25 Dec 2021 21:55) (95% - 99%)    I&O's Summary    24 Dec 2021 07:01  -  25 Dec 2021 07:00  --------------------------------------------------------  IN: 300 mL / OUT: 1150 mL / NET: -850 mL        MEDICATIONS  (STANDING):  atorvastatin 40 milliGRAM(s) Oral at bedtime  carbidopa/levodopa  25/100 1 Tablet(s) Oral three times a day  chlorhexidine 2% Cloths 1 Application(s) Topical daily  dextrose 40% Gel 15 Gram(s) Oral once  dextrose 5%. 1000 milliLiter(s) (100 mL/Hr) IV Continuous <Continuous>  dextrose 5%. 1000 milliLiter(s) (50 mL/Hr) IV Continuous <Continuous>  dextrose 50% Injectable 25 Gram(s) IV Push once  dextrose 50% Injectable 12.5 Gram(s) IV Push once  dextrose 50% Injectable 25 Gram(s) IV Push once  escitalopram 10 milliGRAM(s) Oral daily  FIRST- Mouthwash  BLM 10 milliLiter(s) Swish and Spit every 12 hours  glucagon  Injectable 1 milliGRAM(s) IntraMuscular once  heparin   Injectable 7500 Unit(s) SubCutaneous every 8 hours  insulin glargine Injectable (LANTUS) 24 Unit(s) SubCutaneous <User Schedule>  insulin lispro (ADMELOG) corrective regimen sliding scale   SubCutaneous three times a day before meals  insulin lispro (ADMELOG) corrective regimen sliding scale   SubCutaneous at bedtime  insulin lispro Injectable (ADMELOG) 6 Unit(s) SubCutaneous three times a day before meals  levothyroxine 125 MICROGram(s) Oral daily  loratadine 10 milliGRAM(s) Oral daily  LORazepam     Tablet 0.5 milliGRAM(s) Oral at bedtime  losartan 100 milliGRAM(s) Oral daily  pancrelipase  (CREON 36,000 Lipase Units) 2 Capsule(s) Oral three times a day with meals  pantoprazole    Tablet 40 milliGRAM(s) Oral before breakfast  rasagiline Tablet 1 milliGRAM(s) Oral daily    MEDICATIONS  (PRN):  acetaminophen     Tablet .. 650 milliGRAM(s) Oral every 6 hours PRN Temp greater or equal to 38C (100.4F), Mild Pain (1 - 3)  haloperidol    Injectable 2 milliGRAM(s) IV Push every 6 hours PRN Agitation  sodium chloride 0.65% Nasal 1 Spray(s) Both Nostrils two times a day PRN Nasal Congestion            PHYSICAL EXAM:    GENERAL: Laying comfortably, NAD  EYES: EOMI, PERRL, no scleral icterus  NECK: No JVD  LUNG: Clear to auscultation bilaterally; No wheeze, crackles or rhonci  HEART: Regular rate and rhythm; No murmurs, rubs, or gallops  ABDOMEN: Soft, Nontender, Nondistended  EXTREMITIES:  No LE edema, 2+ Peripheral Pulses, No clubbing, cyanosis, or edema  PSYCH: AAOx3  NEUROLOGY: non-focal, strength 5/5 in all extremities, sensation intact  SKIN: No rashes or lesions      LABS:                            11.6   11.61 )-----------( 149      ( 25 Dec 2021 06:52 )             37.5     Auto Eosinophil # x     / Auto Eosinophil % x     / Auto Neutrophil # x     / Auto Neutrophil % x     / BANDS % x                            12.5   10.88 )-----------( 154      ( 24 Dec 2021 09:21 )             40.1     Auto Eosinophil # x     / Auto Eosinophil % x     / Auto Neutrophil # x     / Auto Neutrophil % x     / BANDS % x        12-25    136  |  101  |  18  ----------------------------<  341<H>  4.7   |  25  |  0.72  12-24    139  |  104  |  22  ----------------------------<  276<H>  4.6   |  26  |  0.78    Ca    8.5      25 Dec 2021 06:52  Mg     2.00     12-25  Phos  2.6     12-25  TPro  5.1<L>  /  Alb  2.6<L>  /  TBili  0.8  /  DBili  x   /  AST  22  /  ALT  5   /  AlkPhos  112  12-25  TPro  5.5<L>  /  Alb  2.7<L>  /  TBili  0.9  /  DBili  x   /  AST  33<H>  /  ALT  14  /  AlkPhos  145<H>  12-24              RADIOLOGY & ADDITIONAL TESTS:    Imaging Personally Reviewed:    Consultant(s) Notes Reviewed:      Care Discussed with Consultants/Other Providers:      _________________________________________________________________________________________________________________________________________  Dale Valencia MD  PGY-1 Internal Medicine  Pager: [phone] (NS) [phone] ([facility])  Available on Microsoft Teams  26 Dec 2021 06:30         Dale Valencia MD  PGY-1 Internal Medicine  Pager:  [phone] (ns) [phone] ([facility])  Available on [employer] Teams  12-26-21 @ 06:30  _________________________________________________________________________________________________________________________________________    CC:      Patient is a 71y old  Female who presents with a chief complaint of Hyperglycemia (25 Dec 2021 15:21)        SUBJECTIVE:    NAEO.    Patient continues to have some congestion and sinus tenderness with itchy eyes. Also complains of some right ear pain today, but has not noted any drainage from the ear. No other complaints, other than urinary frequency which has been present since admission.    Denies f/c, no n/v/d, +constipation (but would not like stool softeners), no SOB, no chest pain, no abdominal pain, no edema.  _________________________________________________________________________________________________________________________________________    OBJECTIVE:    Vital Signs Last 24 Hrs  T(C): 37.2 (25 Dec 2021 21:55), Max: 37.2 (25 Dec 2021 21:55)  T(F): 98.9 (25 Dec 2021 21:55), Max: 98.9 (25 Dec 2021 21:55)  HR: 85 (25 Dec 2021 21:55) (78 - 85)  BP: 142/60 (25 Dec 2021 21:55) (135/73 - 142/60)  BP(mean): --  RR: 18 (25 Dec 2021 21:55) (17 - 18)  SpO2: 95% (25 Dec 2021 21:55) (95% - 99%)    I&O's Summary    24 Dec 2021 07:01  -  25 Dec 2021 07:00  --------------------------------------------------------  IN: 300 mL / OUT: 1150 mL / NET: -850 mL        MEDICATIONS  (STANDING):  atorvastatin 40 milliGRAM(s) Oral at bedtime  carbidopa/levodopa  25/100 1 Tablet(s) Oral three times a day  chlorhexidine 2% Cloths 1 Application(s) Topical daily  dextrose 40% Gel 15 Gram(s) Oral once  dextrose 5%. 1000 milliLiter(s) (100 mL/Hr) IV Continuous <Continuous>  dextrose 5%. 1000 milliLiter(s) (50 mL/Hr) IV Continuous <Continuous>  dextrose 50% Injectable 25 Gram(s) IV Push once  dextrose 50% Injectable 12.5 Gram(s) IV Push once  dextrose 50% Injectable 25 Gram(s) IV Push once  escitalopram 10 milliGRAM(s) Oral daily  FIRST- Mouthwash  BLM 10 milliLiter(s) Swish and Spit every 12 hours  glucagon  Injectable 1 milliGRAM(s) IntraMuscular once  heparin   Injectable 7500 Unit(s) SubCutaneous every 8 hours  insulin glargine Injectable (LANTUS) 24 Unit(s) SubCutaneous <User Schedule>  insulin lispro (ADMELOG) corrective regimen sliding scale   SubCutaneous three times a day before meals  insulin lispro (ADMELOG) corrective regimen sliding scale   SubCutaneous at bedtime  insulin lispro Injectable (ADMELOG) 6 Unit(s) SubCutaneous three times a day before meals  levothyroxine 125 MICROGram(s) Oral daily  loratadine 10 milliGRAM(s) Oral daily  LORazepam     Tablet 0.5 milliGRAM(s) Oral at bedtime  losartan 100 milliGRAM(s) Oral daily  pancrelipase  (CREON 36,000 Lipase Units) 2 Capsule(s) Oral three times a day with meals  pantoprazole    Tablet 40 milliGRAM(s) Oral before breakfast  rasagiline Tablet 1 milliGRAM(s) Oral daily    MEDICATIONS  (PRN):  acetaminophen     Tablet .. 650 milliGRAM(s) Oral every 6 hours PRN Temp greater or equal to 38C (100.4F), Mild Pain (1 - 3)  haloperidol    Injectable 2 milliGRAM(s) IV Push every 6 hours PRN Agitation  sodium chloride 0.65% Nasal 1 Spray(s) Both Nostrils two times a day PRN Nasal Congestion            PHYSICAL EXAM:    GENERAL: Laying comfortably, NAD  EYES: EOMI, PERRL, no scleral icterus  HEENT: no ear drainage, no tenderness to mastoid palpation, no pain on posterior movement of the ear  NECK: No JVD, no LAD appreciated  LUNG: Clear to auscultation bilaterally; No wheeze, crackles or rhonci  HEART: Regular rate and rhythm; No murmurs, rubs, or gallops  ABDOMEN: Soft, Nontender, Nondistended  EXTREMITIES:  No LE edema, 2+ Peripheral Pulses, No clubbing, cyanosis, or edema  PSYCH: AAOx3  NEUROLOGY: non-focal, strength 5/5 in all extremities, sensation intact  SKIN: No rashes or lesions      LABS:                            11.6   11.61 )-----------( 149      ( 25 Dec 2021 06:52 )             37.5     Auto Eosinophil # x     / Auto Eosinophil % x     / Auto Neutrophil # x     / Auto Neutrophil % x     / BANDS % x                            12.5   10.88 )-----------( 154      ( 24 Dec 2021 09:21 )             40.1     Auto Eosinophil # x     / Auto Eosinophil % x     / Auto Neutrophil # x     / Auto Neutrophil % x     / BANDS % x        12-25    136  |  101  |  18  ----------------------------<  341<H>  4.7   |  25  |  0.72  12-24    139  |  104  |  22  ----------------------------<  276<H>  4.6   |  26  |  0.78    Ca    8.5      25 Dec 2021 06:52  Mg     2.00     12-25  Phos  2.6     12-25  TPro  5.1<L>  /  Alb  2.6<L>  /  TBili  0.8  /  DBili  x   /  AST  22  /  ALT  5   /  AlkPhos  112  12-25  TPro  5.5<L>  /  Alb  2.7<L>  /  TBili  0.9  /  DBili  x   /  AST  33<H>  /  ALT  14  /  AlkPhos  145<H>  12-24              RADIOLOGY & ADDITIONAL TESTS:    Imaging Personally Reviewed:    Consultant(s) Notes Reviewed:      Care Discussed with Consultants/Other Providers:      _________________________________________________________________________________________________________________________________________  Dale Valencia MD  PGY-1 Internal Medicine  Pager: [phone] (NS) [phone] ([facility])  Available on [employer] Teams  26 Dec 2021 06:30         Dale Valencia MD  PGY-1 Internal Medicine  Pager:  [phone] (ns) [phone] ([facility])  Available on [employer] Teams  12-26-21 @ 06:30  _________________________________________________________________________________________________________________________________________    CC:      Patient is a 71y old  Female who presents with a chief complaint of Hyperglycemia (25 Dec 2021 15:21)        SUBJECTIVE:    NAEO.    Patient continues to have some congestion and sinus tenderness with itchy eyes. Also complains of some right ear pain today, but has not noted any drainage from the ear. No other complaints, other than urinary frequency which has been present since admission.    Denies f/c, no n/v/d, +constipation (but would not like stool softeners), no SOB, no chest pain, no abdominal pain, no edema.  _________________________________________________________________________________________________________________________________________    OBJECTIVE:    Vital Signs Last 24 Hrs  T(C): 37.2 (25 Dec 2021 21:55), Max: 37.2 (25 Dec 2021 21:55)  T(F): 98.9 (25 Dec 2021 21:55), Max: 98.9 (25 Dec 2021 21:55)  HR: 85 (25 Dec 2021 21:55) (78 - 85)  BP: 142/60 (25 Dec 2021 21:55) (135/73 - 142/60)  BP(mean): --  RR: 18 (25 Dec 2021 21:55) (17 - 18)  SpO2: 95% (25 Dec 2021 21:55) (95% - 99%)    I&O's Summary    24 Dec 2021 07:01  -  25 Dec 2021 07:00  --------------------------------------------------------  IN: 300 mL / OUT: 1150 mL / NET: -850 mL        MEDICATIONS  (STANDING):  atorvastatin 40 milliGRAM(s) Oral at bedtime  carbidopa/levodopa  25/100 1 Tablet(s) Oral three times a day  chlorhexidine 2% Cloths 1 Application(s) Topical daily  dextrose 40% Gel 15 Gram(s) Oral once  dextrose 5%. 1000 milliLiter(s) (100 mL/Hr) IV Continuous <Continuous>  dextrose 5%. 1000 milliLiter(s) (50 mL/Hr) IV Continuous <Continuous>  dextrose 50% Injectable 25 Gram(s) IV Push once  dextrose 50% Injectable 12.5 Gram(s) IV Push once  dextrose 50% Injectable 25 Gram(s) IV Push once  escitalopram 10 milliGRAM(s) Oral daily  FIRST- Mouthwash  BLM 10 milliLiter(s) Swish and Spit every 12 hours  glucagon  Injectable 1 milliGRAM(s) IntraMuscular once  heparin   Injectable 7500 Unit(s) SubCutaneous every 8 hours  insulin glargine Injectable (LANTUS) 24 Unit(s) SubCutaneous <User Schedule>  insulin lispro (ADMELOG) corrective regimen sliding scale   SubCutaneous three times a day before meals  insulin lispro (ADMELOG) corrective regimen sliding scale   SubCutaneous at bedtime  insulin lispro Injectable (ADMELOG) 6 Unit(s) SubCutaneous three times a day before meals  levothyroxine 125 MICROGram(s) Oral daily  loratadine 10 milliGRAM(s) Oral daily  LORazepam     Tablet 0.5 milliGRAM(s) Oral at bedtime  losartan 100 milliGRAM(s) Oral daily  pancrelipase  (CREON 36,000 Lipase Units) 2 Capsule(s) Oral three times a day with meals  pantoprazole    Tablet 40 milliGRAM(s) Oral before breakfast  rasagiline Tablet 1 milliGRAM(s) Oral daily    MEDICATIONS  (PRN):  acetaminophen     Tablet .. 650 milliGRAM(s) Oral every 6 hours PRN Temp greater or equal to 38C (100.4F), Mild Pain (1 - 3)  haloperidol    Injectable 2 milliGRAM(s) IV Push every 6 hours PRN Agitation  sodium chloride 0.65% Nasal 1 Spray(s) Both Nostrils two times a day PRN Nasal Congestion        PHYSICAL EXAM:    GENERAL: Laying comfortably, NAD  EYES: EOMI, PERRL, no scleral icterus  HEENT: no ear drainage, no tenderness to mastoid palpation, no pain on posterior movement of the ear  NECK: No JVD, no LAD appreciated  LUNG: Clear to auscultation bilaterally; No wheeze, crackles or rhonci  HEART: Regular rate and rhythm; No murmurs, rubs, or gallops  ABDOMEN: Soft, Nontender, Nondistended  EXTREMITIES:  No LE edema, 2+ Peripheral Pulses, No clubbing, cyanosis, or edema  PSYCH: AAOx3  NEUROLOGY: non-focal, strength 5/5 in all extremities, sensation intact  SKIN: No rashes or lesions      Labs:                        12.4   12.55 )-----------( 152      ( 26 Dec 2021 08:31 )             39.2     Auto Eosinophil # x     / Auto Eosinophil % x     / Auto Neutrophil # x     / Auto Neutrophil % x     / BANDS % x                            11.6   11.61 )-----------( 149      ( 25 Dec 2021 06:52 )             37.5     Auto Eosinophil # x     / Auto Eosinophil % x     / Auto Neutrophil # x     / Auto Neutrophil % x     / BANDS % x        Hgb Trend: 12.4<--, 11.6<--, 12.5<--, 12.5<--, 12.6<--    12-26    132<L>  |  97<L>  |  17  ----------------------------<  352<H>  4.8   |  19<L>  |  0.72  12-25    136  |  101  |  18  ----------------------------<  341<H>  4.7   |  25  |  0.72    Ca    8.8      26 Dec 2021 08:31  Mg     2.00     12-26  Phos  2.1     12-26  TPro  5.3<L>  /  Alb  2.7<L>  /  TBili  0.8  /  DBili  x   /  AST  25  /  ALT  7   /  AlkPhos  128<H>  12-26  TPro  5.1<L>  /  Alb  2.6<L>  /  TBili  0.8  /  DBili  x   /  AST  22  /  ALT  5   /  AlkPhos  112  12-25    Creatinine Trend: 0.72<--, 0.72<--, 0.78<--, 0.88<--, 1.02<--, 1.30<--      Labs result reviewed by me.  12-26-21 @ 12:02        RADIOLOGY & ADDITIONAL TESTS:    < from: Xray Chest 1 View- PORTABLE-Urgent (Xray Chest 1 View- PORTABLE-Urgent .) (12.20.21 @ 21:49) >  FINDINGS:    The lungs are clear. No pleural effusion.    Heart size is without change.    Partially imaged right shoulder arthroplasty.    Degenerative changes of the spine.      IMPRESSION:    Clear lungs.    < end of copied text >        _________________________________________________________________________________________________________________________________________  Dale Valencia MD  PGY-1 Internal Medicine  Pager: [phone] (NS) [phone] ([facility])  Available on [employer] Teams  26 Dec 2021 06:30

## 2021-12-26 NOTE — DISCHARGE NOTE PROVIDER - NSDCFUSCHEDAPPT_GEN_ALL_CORE_FT
BELEN GARVIN ; 01/06/2022 ; [facility] [address] BELEN Webb ; 01/11/2022 ; [facility] [address] BELEN GARVIN ; 02/10/2022 ; [facility] [address]

## 2021-12-27 DIAGNOSIS — R09.89 OTHER SPECIFIED SYMPTOMS AND SIGNS INVOLVING THE CIRCULATORY AND RESPIRATORY SYSTEMS: ICD-10-CM

## 2021-12-27 LAB
ALBUMIN SERPL ELPH-MCNC: 2.7 G/DL — LOW (ref 3.3–5)
ALP SERPL-CCNC: 132 U/L — HIGH (ref 40–120)
ALT FLD-CCNC: 9 U/L — SIGNIFICANT CHANGE UP (ref 4–33)
ANION GAP SERPL CALC-SCNC: 11 MMOL/L — SIGNIFICANT CHANGE UP (ref 7–14)
AST SERPL-CCNC: 36 U/L — HIGH (ref 4–32)
BILIRUB SERPL-MCNC: 0.6 MG/DL — SIGNIFICANT CHANGE UP (ref 0.2–1.2)
BUN SERPL-MCNC: 19 MG/DL — SIGNIFICANT CHANGE UP (ref 7–23)
CALCIUM SERPL-MCNC: 8.7 MG/DL — SIGNIFICANT CHANGE UP (ref 8.4–10.5)
CHLORIDE SERPL-SCNC: 100 MMOL/L — SIGNIFICANT CHANGE UP (ref 98–107)
CO2 SERPL-SCNC: 23 MMOL/L — SIGNIFICANT CHANGE UP (ref 22–31)
CREAT SERPL-MCNC: 0.75 MG/DL — SIGNIFICANT CHANGE UP (ref 0.5–1.3)
GLUCOSE BLDC GLUCOMTR-MCNC: 267 MG/DL — HIGH (ref 70–99)
GLUCOSE BLDC GLUCOMTR-MCNC: 363 MG/DL — HIGH (ref 70–99)
GLUCOSE BLDC GLUCOMTR-MCNC: 77 MG/DL — SIGNIFICANT CHANGE UP (ref 70–99)
GLUCOSE BLDC GLUCOMTR-MCNC: 87 MG/DL — SIGNIFICANT CHANGE UP (ref 70–99)
GLUCOSE SERPL-MCNC: 330 MG/DL — HIGH (ref 70–99)
HCT VFR BLD CALC: 35.7 % — SIGNIFICANT CHANGE UP (ref 34.5–45)
HGB BLD-MCNC: 11.6 G/DL — SIGNIFICANT CHANGE UP (ref 11.5–15.5)
MAGNESIUM SERPL-MCNC: 1.8 MG/DL — SIGNIFICANT CHANGE UP (ref 1.6–2.6)
MCHC RBC-ENTMCNC: 28.8 PG — SIGNIFICANT CHANGE UP (ref 27–34)
MCHC RBC-ENTMCNC: 32.5 GM/DL — SIGNIFICANT CHANGE UP (ref 32–36)
MCV RBC AUTO: 88.6 FL — SIGNIFICANT CHANGE UP (ref 80–100)
NRBC # BLD: 0 /100 WBCS — SIGNIFICANT CHANGE UP
NRBC # FLD: 0 K/UL — SIGNIFICANT CHANGE UP
PHOSPHATE SERPL-MCNC: 2.7 MG/DL — SIGNIFICANT CHANGE UP (ref 2.5–4.5)
PLATELET # BLD AUTO: 177 K/UL — SIGNIFICANT CHANGE UP (ref 150–400)
POTASSIUM SERPL-MCNC: 4.6 MMOL/L — SIGNIFICANT CHANGE UP (ref 3.5–5.3)
POTASSIUM SERPL-SCNC: 4.6 MMOL/L — SIGNIFICANT CHANGE UP (ref 3.5–5.3)
PROT SERPL-MCNC: 5.1 G/DL — LOW (ref 6–8.3)
RBC # BLD: 4.03 M/UL — SIGNIFICANT CHANGE UP (ref 3.8–5.2)
RBC # FLD: 16.8 % — HIGH (ref 10.3–14.5)
SARS-COV-2 RNA SPEC QL NAA+PROBE: SIGNIFICANT CHANGE UP
SODIUM SERPL-SCNC: 134 MMOL/L — LOW (ref 135–145)
WBC # BLD: 9.8 K/UL — SIGNIFICANT CHANGE UP (ref 3.8–10.5)
WBC # FLD AUTO: 9.8 K/UL — SIGNIFICANT CHANGE UP (ref 3.8–10.5)

## 2021-12-27 PROCEDURE — 99232 SBSQ HOSP IP/OBS MODERATE 35: CPT | Mod: GC

## 2021-12-27 PROCEDURE — 99232 SBSQ HOSP IP/OBS MODERATE 35: CPT

## 2021-12-27 RX ORDER — INSULIN LISPRO 100/ML
4 VIAL (ML) SUBCUTANEOUS
Refills: 0 | Status: DISCONTINUED | OUTPATIENT
Start: 2021-12-28 | End: 2021-12-28

## 2021-12-27 RX ORDER — INSULIN LISPRO 100/ML
10 VIAL (ML) SUBCUTANEOUS
Refills: 0 | Status: DISCONTINUED | OUTPATIENT
Start: 2021-12-28 | End: 2021-12-31

## 2021-12-27 RX ORDER — LIPASE/PROTEASE/AMYLASE 16-48-48K
6 CAPSULE,DELAYED RELEASE (ENTERIC COATED) ORAL
Refills: 0 | Status: DISCONTINUED | OUTPATIENT
Start: 2021-12-27 | End: 2021-12-31

## 2021-12-27 RX ADMIN — CARBIDOPA AND LEVODOPA 1 TABLET(S): 25; 100 TABLET ORAL at 23:03

## 2021-12-27 RX ADMIN — CARBIDOPA AND LEVODOPA 1 TABLET(S): 25; 100 TABLET ORAL at 06:40

## 2021-12-27 RX ADMIN — Medication 5: at 09:14

## 2021-12-27 RX ADMIN — Medication 8 UNIT(S): at 09:15

## 2021-12-27 RX ADMIN — HEPARIN SODIUM 7500 UNIT(S): 5000 INJECTION INTRAVENOUS; SUBCUTANEOUS at 23:08

## 2021-12-27 RX ADMIN — HEPARIN SODIUM 7500 UNIT(S): 5000 INJECTION INTRAVENOUS; SUBCUTANEOUS at 15:03

## 2021-12-27 RX ADMIN — DIPHENHYDRAMINE HYDROCHLORIDE AND LIDOCAINE HYDROCHLORIDE AND ALUMINUM HYDROXIDE AND MAGNESIUM HYDRO 10 MILLILITER(S): KIT at 18:22

## 2021-12-27 RX ADMIN — LOSARTAN POTASSIUM 100 MILLIGRAM(S): 100 TABLET, FILM COATED ORAL at 06:41

## 2021-12-27 RX ADMIN — Medication 1 SPRAY(S): at 18:22

## 2021-12-27 RX ADMIN — ATORVASTATIN CALCIUM 40 MILLIGRAM(S): 80 TABLET, FILM COATED ORAL at 23:03

## 2021-12-27 RX ADMIN — Medication 1 SPRAY(S): at 06:42

## 2021-12-27 RX ADMIN — Medication 0.5 MILLIGRAM(S): at 23:03

## 2021-12-27 RX ADMIN — Medication 5 UNIT(S): at 12:56

## 2021-12-27 RX ADMIN — Medication 8 UNIT(S): at 18:42

## 2021-12-27 RX ADMIN — CHLORHEXIDINE GLUCONATE 1 APPLICATION(S): 213 SOLUTION TOPICAL at 12:55

## 2021-12-27 RX ADMIN — INSULIN GLARGINE 30 UNIT(S): 100 INJECTION, SOLUTION SUBCUTANEOUS at 09:25

## 2021-12-27 RX ADMIN — Medication 125 MICROGRAM(S): at 06:41

## 2021-12-27 RX ADMIN — CARBIDOPA AND LEVODOPA 1 TABLET(S): 25; 100 TABLET ORAL at 15:03

## 2021-12-27 RX ADMIN — ESCITALOPRAM OXALATE 10 MILLIGRAM(S): 10 TABLET, FILM COATED ORAL at 12:54

## 2021-12-27 RX ADMIN — HEPARIN SODIUM 7500 UNIT(S): 5000 INJECTION INTRAVENOUS; SUBCUTANEOUS at 06:40

## 2021-12-27 RX ADMIN — Medication 3: at 12:55

## 2021-12-27 RX ADMIN — LORATADINE 10 MILLIGRAM(S): 10 TABLET ORAL at 12:55

## 2021-12-27 RX ADMIN — Medication 6 CAPSULE(S): at 19:23

## 2021-12-27 RX ADMIN — RASAGILINE 1 MILLIGRAM(S): 0.5 TABLET ORAL at 12:54

## 2021-12-27 RX ADMIN — PANTOPRAZOLE SODIUM 40 MILLIGRAM(S): 20 TABLET, DELAYED RELEASE ORAL at 06:41

## 2021-12-27 NOTE — PROGRESS NOTE ADULT - PROBLEM SELECTOR PLAN 9
#Hypothyroidism   - patient with thyroid cancer s/p resection in 2000 now with hypothyroidism   - follows with Dr. Ennis outpatient, currently on tirosint levothyroxine sodium) 125mcg daily   - 100 mg IV levothyroxine equivalent   - TSH 10/2021 1.00,  TSH here WNL    PLAN  - c/w IV levothyroxine 100 #SIRS   - patient with leukocytosis and tachycardia on admission   - UA negative for infection, CXR clear lungs   - follow up RVP , COVID (neg), and blood cultures   - currently monitoring off antibiotics, higher suspicion for viral illness     PLAN  - urine culture is pending    -- greater than 3 organisms, possibly contaminated

## 2021-12-27 NOTE — PROGRESS NOTE ADULT - SUBJECTIVE AND OBJECTIVE BOX
Chris Gaxiola MD  Internal Medicine PGY-1  Pager NS: [phone] // [facility]: [phone]    PROGRESS NOTE:     Patient is a 71y old  Female who presents with a chief complaint of Hyperglycemia (26 Dec 2021 13:46)      SUBJECTIVE / OVERNIGHT EVENTS:    No acute events overnight. Patient examined at bedside with no acute complaints.     REVIEW OF SYSTEMS:    CONSTITUTIONAL: No weakness, fevers or chills  EYES/ENT: No visual changes;  No vertigo or throat pain   NECK: No pain or stiffness  RESPIRATORY: No cough, wheezing, hemoptysis; No shortness of breath  CARDIOVASCULAR: No chest pain or palpitations  GASTROINTESTINAL: No abdominal or epigastric pain. No nausea, vomiting, or hematemesis; No diarrhea or constipation. No melena or hematochezia.  GENITOURINARY: No dysuria, frequency or hematuria  NEUROLOGICAL: No numbness or weakness  SKIN: No itching, rashes      MEDICATIONS  (STANDING):  atorvastatin 40 milliGRAM(s) Oral at bedtime  carbidopa/levodopa  25/100 1 Tablet(s) Oral three times a day  chlorhexidine 2% Cloths 1 Application(s) Topical daily  dextrose 40% Gel 15 Gram(s) Oral once  dextrose 5%. 1000 milliLiter(s) (50 mL/Hr) IV Continuous <Continuous>  dextrose 5%. 1000 milliLiter(s) (100 mL/Hr) IV Continuous <Continuous>  dextrose 50% Injectable 25 Gram(s) IV Push once  dextrose 50% Injectable 12.5 Gram(s) IV Push once  dextrose 50% Injectable 25 Gram(s) IV Push once  escitalopram 10 milliGRAM(s) Oral daily  FIRST- Mouthwash  BLM 10 milliLiter(s) Swish and Spit every 12 hours  fluticasone propionate 50 MICROgram(s)/spray Nasal Spray 1 Spray(s) Both Nostrils two times a day  glucagon  Injectable 1 milliGRAM(s) IntraMuscular once  heparin   Injectable 7500 Unit(s) SubCutaneous every 8 hours  insulin glargine Injectable (LANTUS) 30 Unit(s) SubCutaneous <User Schedule>  insulin lispro (ADMELOG) corrective regimen sliding scale   SubCutaneous three times a day before meals  insulin lispro (ADMELOG) corrective regimen sliding scale   SubCutaneous at bedtime  insulin lispro Injectable (ADMELOG) 8 Unit(s) SubCutaneous before breakfast  insulin lispro Injectable (ADMELOG) 5 Unit(s) SubCutaneous before lunch  insulin lispro Injectable (ADMELOG) 8 Unit(s) SubCutaneous before dinner  levothyroxine 125 MICROGram(s) Oral daily  loratadine 10 milliGRAM(s) Oral daily  LORazepam     Tablet 0.5 milliGRAM(s) Oral at bedtime  losartan 100 milliGRAM(s) Oral daily  pancrelipase  (CREON 36,000 Lipase Units) 2 Capsule(s) Oral three times a day with meals  pantoprazole    Tablet 40 milliGRAM(s) Oral before breakfast  rasagiline Tablet 1 milliGRAM(s) Oral daily    MEDICATIONS  (PRN):  acetaminophen     Tablet .. 650 milliGRAM(s) Oral every 6 hours PRN Temp greater or equal to 38C (100.4F), Mild Pain (1 - 3)  haloperidol    Injectable 2 milliGRAM(s) IV Push every 6 hours PRN Agitation  sodium chloride 0.65% Nasal 1 Spray(s) Both Nostrils two times a day PRN Nasal Congestion      CAPILLARY BLOOD GLUCOSE      POCT Blood Glucose.: 104 mg/dL (26 Dec 2021 22:28)  POCT Blood Glucose.: 207 mg/dL (26 Dec 2021 17:37)  POCT Blood Glucose.: 227 mg/dL (26 Dec 2021 13:01)  POCT Blood Glucose.: 392 mg/dL (26 Dec 2021 09:34)    I&O's Summary      VITALS:   T(C): 37.1 (12-26-21 @ 22:30), Max: 37.1 (12-26-21 @ 22:30)  HR: 86 (12-26-21 @ 22:30) (86 - 87)  BP: 137/60 (12-26-21 @ 22:30) (128/50 - 137/60)  RR: 18 (12-26-21 @ 22:30) (18 - 18)  SpO2: 100% (12-26-21 @ 22:30) (96% - 100%)    GENERAL: NAD, lying in bed comfortably  HEAD:  Atraumatic, normocephalic  EYES: EOMI, PERRLA, conjunctiva and sclera clear  ENT: Moist mucous membranes  NECK: Supple, no JVD  HEART: Regular rate and rhythm, no murmurs, rubs, or gallops  LUNGS: Unlabored respirations.  Clear to auscultation bilaterally, no crackles, wheezing, or rhonchi  ABDOMEN: Soft, nontender, nondistended, +BS  EXTREMITIES: 2+ peripheral pulses bilaterally. No clubbing, cyanosis, or edema  NERVOUS SYSTEM:  A&Ox3, no focal deficits   SKIN: No rashes or lesions    LABS:                        11.6   9.80  )-----------( 177      ( 27 Dec 2021 06:58 )             35.7     12-26    132<L>  |  97<L>  |  17  ----------------------------<  352<H>  4.8   |  19<L>  |  0.72    Ca    8.8      26 Dec 2021 08:31  Phos  2.1     12-26  Mg     2.00     12-26    TPro  5.3<L>  /  Alb  2.7<L>  /  TBili  0.8  /  DBili  x   /  AST  25  /  ALT  7   /  AlkPhos  128<H>  12-26                RADIOLOGY & ADDITIONAL TESTS:  Results Reviewed:   Imaging Personally Reviewed:  Electrocardiogram Personally Reviewed:    COORDINATION OF CARE:  Care Discussed with Consultants/Other Providers [Y/N]:  Prior or Outpatient Records Reviewed [Y/N]:   Chris Gaxiola MD  Internal Medicine PGY-1  Pager NS: [phone] // [facility]: [phone]    PROGRESS NOTE:     Patient is a 71y old  Female who presents with a chief complaint of Hyperglycemia (26 Dec 2021 13:46)      SUBJECTIVE / OVERNIGHT EVENTS:    Pt. reports choking yesterday while drinking earlier in the day. Was concerned about low glucose last night and drank juice before bed, sugar found to be 363 this morning. Still complaining of mild congestion and sinus tenderness.    REVIEW OF SYSTEMS:    CONSTITUTIONAL: No weakness, fevers or chills  EYES/ENT: No visual changes;  No vertigo or throat pain   NECK: No pain or stiffness  RESPIRATORY: No cough, wheezing, hemoptysis; No shortness of breath  CARDIOVASCULAR: No chest pain or palpitations  GASTROINTESTINAL: No abdominal or epigastric pain. No nausea, vomiting, or hematemesis; No diarrhea or constipation. No melena or hematochezia.  GENITOURINARY: No dysuria, frequency or hematuria  NEUROLOGICAL: No numbness or weakness  SKIN: No itching, rashes      MEDICATIONS  (STANDING):  atorvastatin 40 milliGRAM(s) Oral at bedtime  carbidopa/levodopa  25/100 1 Tablet(s) Oral three times a day  chlorhexidine 2% Cloths 1 Application(s) Topical daily  dextrose 40% Gel 15 Gram(s) Oral once  dextrose 5%. 1000 milliLiter(s) (50 mL/Hr) IV Continuous <Continuous>  dextrose 5%. 1000 milliLiter(s) (100 mL/Hr) IV Continuous <Continuous>  dextrose 50% Injectable 25 Gram(s) IV Push once  dextrose 50% Injectable 12.5 Gram(s) IV Push once  dextrose 50% Injectable 25 Gram(s) IV Push once  escitalopram 10 milliGRAM(s) Oral daily  FIRST- Mouthwash  BLM 10 milliLiter(s) Swish and Spit every 12 hours  fluticasone propionate 50 MICROgram(s)/spray Nasal Spray 1 Spray(s) Both Nostrils two times a day  glucagon  Injectable 1 milliGRAM(s) IntraMuscular once  heparin   Injectable 7500 Unit(s) SubCutaneous every 8 hours  insulin glargine Injectable (LANTUS) 30 Unit(s) SubCutaneous <User Schedule>  insulin lispro (ADMELOG) corrective regimen sliding scale   SubCutaneous three times a day before meals  insulin lispro (ADMELOG) corrective regimen sliding scale   SubCutaneous at bedtime  insulin lispro Injectable (ADMELOG) 8 Unit(s) SubCutaneous before breakfast  insulin lispro Injectable (ADMELOG) 5 Unit(s) SubCutaneous before lunch  insulin lispro Injectable (ADMELOG) 8 Unit(s) SubCutaneous before dinner  levothyroxine 125 MICROGram(s) Oral daily  loratadine 10 milliGRAM(s) Oral daily  LORazepam     Tablet 0.5 milliGRAM(s) Oral at bedtime  losartan 100 milliGRAM(s) Oral daily  pancrelipase  (CREON 36,000 Lipase Units) 2 Capsule(s) Oral three times a day with meals  pantoprazole    Tablet 40 milliGRAM(s) Oral before breakfast  rasagiline Tablet 1 milliGRAM(s) Oral daily    MEDICATIONS  (PRN):  acetaminophen     Tablet .. 650 milliGRAM(s) Oral every 6 hours PRN Temp greater or equal to 38C (100.4F), Mild Pain (1 - 3)  haloperidol    Injectable 2 milliGRAM(s) IV Push every 6 hours PRN Agitation  sodium chloride 0.65% Nasal 1 Spray(s) Both Nostrils two times a day PRN Nasal Congestion      CAPILLARY BLOOD GLUCOSE      POCT Blood Glucose.: 104 mg/dL (26 Dec 2021 22:28)  POCT Blood Glucose.: 207 mg/dL (26 Dec 2021 17:37)  POCT Blood Glucose.: 227 mg/dL (26 Dec 2021 13:01)  POCT Blood Glucose.: 392 mg/dL (26 Dec 2021 09:34)    I&O's Summary      VITALS:   T(C): 37.1 (12-26-21 @ 22:30), Max: 37.1 (12-26-21 @ 22:30)  HR: 86 (12-26-21 @ 22:30) (86 - 87)  BP: 137/60 (12-26-21 @ 22:30) (128/50 - 137/60)  RR: 18 (12-26-21 @ 22:30) (18 - 18)  SpO2: 100% (12-26-21 @ 22:30) (96% - 100%)    GENERAL: NAD, lying in bed comfortably  HEAD:  Atraumatic, normocephalic, no tenderness to mastoid palpation  EYES: EOMI, PERRLA, conjunctiva and sclera clear  ENT: Moist mucous membranes  NECK: Supple, no JVD  HEART: Regular rate and rhythm, no murmurs, rubs, or gallops  LUNGS: Unlabored respirations.  Clear to auscultation bilaterally, no crackles, wheezing, or rhonchi  ABDOMEN: Soft, nontender, nondistended, +BS  EXTREMITIES: 2+ peripheral pulses bilaterally. No clubbing, cyanosis, or edema  NERVOUS SYSTEM:  A&Ox3, no focal deficits   SKIN: No rashes or lesions    LABS:                        11.6   9.80  )-----------( 177      ( 27 Dec 2021 06:58 )             35.7     12-26    132<L>  |  97<L>  |  17  ----------------------------<  352<H>  4.8   |  19<L>  |  0.72    Ca    8.8      26 Dec 2021 08:31  Phos  2.1     12-26  Mg     2.00     12-26    TPro  5.3<L>  /  Alb  2.7<L>  /  TBili  0.8  /  DBili  x   /  AST  25  /  ALT  7   /  AlkPhos  128<H>  12-26    RADIOLOGY & ADDITIONAL TESTS:  Results Reviewed:   Imaging Personally Reviewed:  Electrocardiogram Personally Reviewed:    COORDINATION OF CARE:  Care Discussed with Consultants/Other Providers [Y/N]:  Prior or Outpatient Records Reviewed [Y/N]:   Chris Gaxiola MD  Internal Medicine PGY-1  Pager NS: [phone] // [facility]: [phone]    PROGRESS NOTE:     Patient is a 71y old  Female who presents with a chief complaint of Hyperglycemia (26 Dec 2021 13:46)      SUBJECTIVE / OVERNIGHT EVENTS:    Pt. reports choking yesterday while drinking earlier in the day. Was concerned about low glucose last night and drank juice before bed, sugar found to be 363 this morning. Still complaining of mild congestion and sinus tenderness. Pt. prefers to go to [facility] rehab but is amenable to [facility] if [facility] unavailable.    REVIEW OF SYSTEMS:    CONSTITUTIONAL: No weakness, fevers or chills  EYES/ENT: No visual changes;  No vertigo or throat pain   NECK: No pain or stiffness  RESPIRATORY: No cough, wheezing, hemoptysis; No shortness of breath  CARDIOVASCULAR: No chest pain or palpitations  GASTROINTESTINAL: No abdominal or epigastric pain. No nausea, vomiting, or hematemesis; No diarrhea or constipation. No melena or hematochezia.  GENITOURINARY: No dysuria, frequency or hematuria  NEUROLOGICAL: No numbness or weakness  SKIN: No itching, rashes      MEDICATIONS  (STANDING):  atorvastatin 40 milliGRAM(s) Oral at bedtime  carbidopa/levodopa  25/100 1 Tablet(s) Oral three times a day  chlorhexidine 2% Cloths 1 Application(s) Topical daily  dextrose 40% Gel 15 Gram(s) Oral once  dextrose 5%. 1000 milliLiter(s) (50 mL/Hr) IV Continuous <Continuous>  dextrose 5%. 1000 milliLiter(s) (100 mL/Hr) IV Continuous <Continuous>  dextrose 50% Injectable 25 Gram(s) IV Push once  dextrose 50% Injectable 12.5 Gram(s) IV Push once  dextrose 50% Injectable 25 Gram(s) IV Push once  escitalopram 10 milliGRAM(s) Oral daily  FIRST- Mouthwash  BLM 10 milliLiter(s) Swish and Spit every 12 hours  fluticasone propionate 50 MICROgram(s)/spray Nasal Spray 1 Spray(s) Both Nostrils two times a day  glucagon  Injectable 1 milliGRAM(s) IntraMuscular once  heparin   Injectable 7500 Unit(s) SubCutaneous every 8 hours  insulin glargine Injectable (LANTUS) 30 Unit(s) SubCutaneous <User Schedule>  insulin lispro (ADMELOG) corrective regimen sliding scale   SubCutaneous three times a day before meals  insulin lispro (ADMELOG) corrective regimen sliding scale   SubCutaneous at bedtime  insulin lispro Injectable (ADMELOG) 8 Unit(s) SubCutaneous before breakfast  insulin lispro Injectable (ADMELOG) 5 Unit(s) SubCutaneous before lunch  insulin lispro Injectable (ADMELOG) 8 Unit(s) SubCutaneous before dinner  levothyroxine 125 MICROGram(s) Oral daily  loratadine 10 milliGRAM(s) Oral daily  LORazepam     Tablet 0.5 milliGRAM(s) Oral at bedtime  losartan 100 milliGRAM(s) Oral daily  pancrelipase  (CREON 36,000 Lipase Units) 2 Capsule(s) Oral three times a day with meals  pantoprazole    Tablet 40 milliGRAM(s) Oral before breakfast  rasagiline Tablet 1 milliGRAM(s) Oral daily    MEDICATIONS  (PRN):  acetaminophen     Tablet .. 650 milliGRAM(s) Oral every 6 hours PRN Temp greater or equal to 38C (100.4F), Mild Pain (1 - 3)  haloperidol    Injectable 2 milliGRAM(s) IV Push every 6 hours PRN Agitation  sodium chloride 0.65% Nasal 1 Spray(s) Both Nostrils two times a day PRN Nasal Congestion      CAPILLARY BLOOD GLUCOSE      POCT Blood Glucose.: 104 mg/dL (26 Dec 2021 22:28)  POCT Blood Glucose.: 207 mg/dL (26 Dec 2021 17:37)  POCT Blood Glucose.: 227 mg/dL (26 Dec 2021 13:01)  POCT Blood Glucose.: 392 mg/dL (26 Dec 2021 09:34)    I&O's Summary      VITALS:   T(C): 37.1 (12-26-21 @ 22:30), Max: 37.1 (12-26-21 @ 22:30)  HR: 86 (12-26-21 @ 22:30) (86 - 87)  BP: 137/60 (12-26-21 @ 22:30) (128/50 - 137/60)  RR: 18 (12-26-21 @ 22:30) (18 - 18)  SpO2: 100% (12-26-21 @ 22:30) (96% - 100%)    GENERAL: NAD, lying in bed comfortably  HEAD:  Atraumatic, normocephalic, no tenderness to mastoid palpation  EYES: EOMI, PERRLA, conjunctiva and sclera clear  ENT: Moist mucous membranes  NECK: Supple, no JVD  HEART: Regular rate and rhythm, no murmurs, rubs, or gallops  LUNGS: Unlabored respirations.  Clear to auscultation bilaterally, no crackles, wheezing, or rhonchi  ABDOMEN: Soft, nontender, nondistended, +BS  EXTREMITIES: 2+ peripheral pulses bilaterally. No clubbing, cyanosis, or edema  NERVOUS SYSTEM:  A&Ox3, no focal deficits   SKIN: No rashes or lesions    LABS:                        11.6   9.80  )-----------( 177      ( 27 Dec 2021 06:58 )             35.7     12-26    132<L>  |  97<L>  |  17  ----------------------------<  352<H>  4.8   |  19<L>  |  0.72    Ca    8.8      26 Dec 2021 08:31  Phos  2.1     12-26  Mg     2.00     12-26    TPro  5.3<L>  /  Alb  2.7<L>  /  TBili  0.8  /  DBili  x   /  AST  25  /  ALT  7   /  AlkPhos  128<H>  12-26    RADIOLOGY & ADDITIONAL TESTS:  Results Reviewed:   Imaging Personally Reviewed:  Electrocardiogram Personally Reviewed:    COORDINATION OF CARE:  Care Discussed with Consultants/Other Providers [Y/N]:  Prior or Outpatient Records Reviewed [Y/N]:

## 2021-12-27 NOTE — PROGRESS NOTE ADULT - PROBLEM SELECTOR PLAN 7
Patient on atorvastatin 40    PLAN  - c/w home medication #AZALEA   - patient with BUN 63, Cr 2.14 on admission (baseline creatinine 0.8),   - likely pre-renal in the setting of DKA   - will treat DKA and continue to trend BUN and creatinine  #Anion gap metabolic acidosis   - on admission pH 7.13, bicarb 8, AG 37   - likely multifactorial in origin with DKA, uremia and lactate contributing   - will continue to trend lactate and treat DKA and pre-renal AZALEA  - gap is 13 --> 10 now   #hypophosphatemia  - replete 12/22    PLAN  - Cr at 0.72 today, back to baseline  - gap decreased 16 -> 11, Cr still appropriate  - electrolytes wnl  - trend BMP and replete as required

## 2021-12-27 NOTE — PROGRESS NOTE ADULT - PROBLEM SELECTOR PLAN 3
Patient has Creon on med rec with meals and snacks, likely in the setting of pancreatic insufficiency due to prolonged diabetes.    PLAN  - c/w Creon 82270 units 2 capsules w/ breakfast, lunch, dinner  - c/w Creon 24551 units 1 capsule w/ snacks #HTN   - blood pressure currently well controlled   - On losartan 100 mg at home    PLAN  - pressures have been stable  - c/w Losartan 100mg qdaily

## 2021-12-27 NOTE — PROVIDER CONTACT NOTE (OTHER) - SITUATION
Fifion is not available.
Patient had hypoglycemic episode, blood glucose of 44 then 53 after recheck.

## 2021-12-27 NOTE — PROGRESS NOTE ADULT - PROBLEM SELECTOR PLAN 8
#AZALEA   - patient with BUN 63, Cr 2.14 on admission (baseline creatinine 0.8),   - likely pre-renal in the setting of DKA   - will treat DKA and continue to trend BUN and creatinine  #Anion gap metabolic acidosis   - on admission pH 7.13, bicarb 8, AG 37   - likely multifactorial in origin with DKA, uremia and lactate contributing   - will continue to trend lactate and treat DKA and pre-renal AZALEA  - gap is 13 --> 10 now   #hypophosphatemia  - replete 12/22    PLAN  - Cr at 0.72 today, back to baseline  - gap increased today to 16, i/s/o increase sugar, Cr still appropriate  - electrolytes wnl  - trend BMP and replete as required    -- repleted phosphorus today #Hypothyroidism   - patient with thyroid cancer s/p resection in 2000 now with hypothyroidism   - follows with Dr. Ennis outpatient, currently on tirosint levothyroxine sodium) 125mcg daily   - 100 mg IV levothyroxine equivalent   - TSH 10/2021 1.00,  TSH here WNL    PLAN  - c/w IV levothyroxine 100 #AZALEA   - patient with BUN 63, Cr 2.14 on admission (baseline creatinine 0.8),   - likely pre-renal in the setting of DKA   - will treat DKA and continue to trend BUN and creatinine  #Anion gap metabolic acidosis   - on admission pH 7.13, bicarb 8, AG 37   - likely multifactorial in origin with DKA, uremia and lactate contributing   - will continue to trend lactate and treat DKA and pre-renal AZALEA  - gap is 13 --> 10 now   #hypophosphatemia  - replete 12/22    PLAN  - Cr at 0.72 today, back to baseline  - gap decreased 16 -> 11, Cr still appropriate  - electrolytes wnl  - trend BMP and replete as required

## 2021-12-27 NOTE — PROGRESS NOTE ADULT - PROBLEM SELECTOR PLAN 2
#HTN   - blood pressure currently well controlled   - On losartan 100 mg at home    PLAN  - pressures have been stable  - c/w Losartan 100mg qdaily -pt reported choking with liquids yesterday  -cinesophogram ordered

## 2021-12-27 NOTE — PROGRESS NOTE ADULT - ASSESSMENT
71 year old woman with T1DM, uncontrolled, previously on insulin pump, admitted with DKA    1) T1DM, uncontrolled  DKA resolved  Pt with variable glucose values, has had continued fasting hyperglycemia.  Lantus dose was increased this morning, monitor on current dose  Pt with continued post prandial hyperglycemia after breakfast.,  Increase pre-breakfast admelog to 10 units.  Continue to monitor on  5 units before lunch and 8 units before dinner.  Continue admelog correction scale  Inpatient glucose goal 100 - 180 mg/dl  Counseled pt on alternate bedtime snacks, rather than juice    Plan to discharge on basal bolus insulin, if leaving today, then use doses above, switch to Toujeo for basal insulin and Apidra for prandial as she had been using them in the past  She was having difficulty using the insulin pump, particularly filling the reservoir with insertion site changes, would like to continue on insulin injections  She will follow up with Dr Sophia Terrell post discharge, and will change to carbohydrate based dosing for prandial insulin    2) Hyperlipidemia   - continue atorvastatin    3) Thyroid cancer, hypothyroidism  Continue levothyroxine 125 micrograms po daily        Pat Cano MD  pager  on 12/27  Diabetes team: [phone] business hours  [phone] night/weekend

## 2021-12-27 NOTE — PROGRESS NOTE ADULT - PROBLEM SELECTOR PLAN 6
#Shortness of breath   - Patient's son reports she was experiencing SOB in the day prior to admission   - CXR clear, RVP and covid negative   - blood cultures pending  - patient currently saturating well on room air     PLAN   - ctm  - resolved Patient on atorvastatin 40    PLAN  - c/w home medication Patient has history of Parkinson. Med rec with sinamet  TID    PLAN  - c/w home medication

## 2021-12-27 NOTE — SWALLOW BEDSIDE ASSESSMENT ADULT - COMMENTS
Per progress note, 12/27/21, patient is a "71F hx of T1DM (since age 38), Parkinsons (diagnosed 2017), bladder incontinence, anxiety/depression, HTN, HLD, thyroid cancer s/p resection in 2000 now with hypothyroidism presents to the ED d/t SOB, nausea, vomiting and AMS for one day. Patient found to be in DKA and admitted to the MICU for further management. Now improved and step down to the floor."    WBC is WFL. Most recent CXR revealed "clear lungs".    Patient seen at bedside this AM for an initial assessment of the swallow function, at which time patient was alert and cooperative. Patient reporting no difficulty with solid foods, however, experienced a "choking" episode on thin liquids yesterday. Patient states previous choking episodes prior to admission on thin liquids as well. Patient further reporting burning in mouth and tongue. Patient denies pain in throat.

## 2021-12-27 NOTE — SWALLOW BEDSIDE ASSESSMENT ADULT - SWALLOW EVAL: DIAGNOSIS
1. Patient demonstrated a functional oral stage for puree, regular solids, and thin liquids marked by adequate bolus collection, transfer and posterior transport. Patient reports "crumbs" get left over in her mouth during regular solid trials that reduces with spontaneous subsequent swallow. 2. Patient demonstrated a functional pharyngeal phase of swallow for puree, regular solids and thin liquids marked by a present pharyngeal swallow trigger with hyolaryngeal elevation noted upon digital palpation without evidence of airway penetration/aspiration.

## 2021-12-27 NOTE — PROVIDER CONTACT NOTE (OTHER) - ASSESSMENT
Creon is not available in the pharmacy.
Patient had hypoglycemic episode, blood glucose of 44 then 53 after recheck. Patient was symptomatic with lightheadedness and shakiness.

## 2021-12-27 NOTE — PROGRESS NOTE ADULT - ATTENDING COMMENTS
AM FS high, no further hypoglycemic events  on inc Lantus 30, premeal 8/5/8  defer insulin to endo  had choking event overnight - soft w thin liquids, cineesophagogram per swallow  pt now agreeable to [facility]  updated sw and cm

## 2021-12-27 NOTE — PROGRESS NOTE ADULT - SUBJECTIVE AND OBJECTIVE BOX
Diabetes  Follow up note    Interval History: Pt reports good appetite.  Eating hospital meals.  Was concerned about glucose of 104 yesterday so drank juice before bed.  Also reports episode of choking yesterday while drinking earlier in the day.    MEDICATIONS  (STANDING):  atorvastatin 40 milliGRAM(s) Oral at bedtime  carbidopa/levodopa  25/100 1 Tablet(s) Oral three times a day  chlorhexidine 2% Cloths 1 Application(s) Topical daily  dextrose 40% Gel 15 Gram(s) Oral once  dextrose 5%. 1000 milliLiter(s) (50 mL/Hr) IV Continuous <Continuous>  dextrose 5%. 1000 milliLiter(s) (100 mL/Hr) IV Continuous <Continuous>  dextrose 50% Injectable 25 Gram(s) IV Push once  dextrose 50% Injectable 12.5 Gram(s) IV Push once  dextrose 50% Injectable 25 Gram(s) IV Push once  escitalopram 10 milliGRAM(s) Oral daily  FIRST- Mouthwash  BLM 10 milliLiter(s) Swish and Spit every 12 hours  fluticasone propionate 50 MICROgram(s)/spray Nasal Spray 1 Spray(s) Both Nostrils two times a day  glucagon  Injectable 1 milliGRAM(s) IntraMuscular once  heparin   Injectable 7500 Unit(s) SubCutaneous every 8 hours  insulin glargine Injectable (LANTUS) 30 Unit(s) SubCutaneous <User Schedule>  insulin lispro (ADMELOG) corrective regimen sliding scale   SubCutaneous three times a day before meals  insulin lispro (ADMELOG) corrective regimen sliding scale   SubCutaneous at bedtime  insulin lispro Injectable (ADMELOG) 8 Unit(s) SubCutaneous before breakfast  insulin lispro Injectable (ADMELOG) 5 Unit(s) SubCutaneous before lunch  insulin lispro Injectable (ADMELOG) 8 Unit(s) SubCutaneous before dinner  levothyroxine 125 MICROGram(s) Oral daily  loratadine 10 milliGRAM(s) Oral daily  LORazepam     Tablet 0.5 milliGRAM(s) Oral at bedtime  losartan 100 milliGRAM(s) Oral daily  pancrelipase  (CREON 36,000 Lipase Units) 2 Capsule(s) Oral three times a day with meals  pantoprazole    Tablet 40 milliGRAM(s) Oral before breakfast  rasagiline Tablet 1 milliGRAM(s) Oral daily    MEDICATIONS  (PRN):  acetaminophen     Tablet .. 650 milliGRAM(s) Oral every 6 hours PRN Temp greater or equal to 38C (100.4F), Mild Pain (1 - 3)  haloperidol    Injectable 2 milliGRAM(s) IV Push every 6 hours PRN Agitation  sodium chloride 0.65% Nasal 1 Spray(s) Both Nostrils two times a day PRN Nasal Congestion      Allergies    Ceclor (Unknown)  iodine containing compounds (Unknown)  shellfish (Unknown)    Intolerances          PHYSICAL EXAM:  VITALS: T(C): 37.1 (12-27-21 @ 07:00)  T(F): 98.8 (12-27-21 @ 07:00), Max: 98.8 (12-27-21 @ 07:00)  HR: 69 (12-27-21 @ 07:00) (69 - 87)  BP: 150/73 (12-27-21 @ 07:00) (128/50 - 150/73)  RR:  (18 - 18)  SpO2:  (96% - 100%)  Wt(kg): 105  GENERAL: Lying  in bed in NAD,   EYES: No proptosis,  HEENT:  Atraumatic, Normocephalic,   RESPIRATORY: Clear to auscultation bilaterally  CARDIOVASCULAR: Regular rhythm; No murmurs; trace peripheral edema  GI: Soft, nontender, non distended, normal bowel sounds        POCT Blood Glucose.: 267 mg/dL (12-27-21 @ 12:52)  POCT Blood Glucose.: 363 mg/dL (12-27-21 @ 08:53)  POCT Blood Glucose.: 104 mg/dL (12-26-21 @ 22:28)  POCT Blood Glucose.: 207 mg/dL (12-26-21 @ 17:37)  POCT Blood Glucose.: 227 mg/dL (12-26-21 @ 13:01)  POCT Blood Glucose.: 392 mg/dL (12-26-21 @ 09:34)  POCT Blood Glucose.: 205 mg/dL (12-25-21 @ 22:38)  POCT Blood Glucose.: 91 mg/dL (12-25-21 @ 17:55)  POCT Blood Glucose.: 256 mg/dL (12-25-21 @ 12:22)  POCT Blood Glucose.: 331 mg/dL (12-25-21 @ 08:47)  POCT Blood Glucose.: 195 mg/dL (12-25-21 @ 02:01)  POCT Blood Glucose.: 145 mg/dL (12-24-21 @ 22:08)  POCT Blood Glucose.: 96 mg/dL (12-24-21 @ 21:45)  POCT Blood Glucose.: 53 mg/dL (12-24-21 @ 21:19)  POCT Blood Glucose.: 44 mg/dL (12-24-21 @ 21:16)  POCT Blood Glucose.: 83 mg/dL (12-24-21 @ 18:22)        12-27    134<L>  |  100  |  19  ----------------------------<  330<H>  4.6   |  23  |  0.75    EGFR if : 92  EGFR if non : 79    Ca    8.7      12-27  Mg     1.80     12-27  Phos  2.7     12-27    TPro  5.1<L>  /  Alb  2.7<L>  /  TBili  0.6  /  DBili  x   /  AST  36<H>  /  ALT  9   /  AlkPhos  132<H>  12-27        A1C with Estimated Average Glucose Result: 9.4 % (12-20-21 @ 19:29)

## 2021-12-27 NOTE — PROGRESS NOTE ADULT - PROBLEM SELECTOR PLAN 5
Patient has history of Parkinson. Med rec with sinamet  TID    PLAN  - c/w home medication #Acute metabolic encephalopathy   - patient agitated and confused on admission (normally A+Ox3, on admission A+Ox0) - currently AOx3  - likely secondary to DKA   - infectious workup thus far has been negative, urine cultures are pending; will replete electrolytes as needed   - was receiving IV Haldol PRN in the ICU    PLAN  - AAOx3  - d/c Haldol

## 2021-12-27 NOTE — PROGRESS NOTE ADULT - PROBLEM SELECTOR PLAN 1
#Diabetes with DKA  - Patient with hx of DM1, non-complaint with basal bolus at home, A1C 9.4% on admission  - on admission glucose 1029, pH 7.13, bicarb 8, AG 37, BHB 9, K 6.0   - patient received insulin drip at 9units per hour while in MICU, adjusted per DKA protocol  - s/p 2L of fluids, now on D5 until patient eats  - PO diet was started, patient received 18 of Lantus in the AM  - sugars elevated in the AM, but improving throughout the day    PLAN  - ctm sugars with meals and bedtime - goal 140 - 180; elevated overnight  - endocrinology recommendations appreciated  - Lantus 30 sq for bedtime basal insulin  - Admelog 8 before breakfast, 5 before lunch, and 8 before dinner    -- these are discharge recs as well and will follow with endocrine outpatient  - LDCS for both meals and bedtime  - DISCHARGE: will not d/c on a pump.  pt is willing to be discharged on insulin pens.  She knows how to inject - dose TBD. #Diabetes with DKA  - Patient with hx of DM1, non-complaint with basal bolus at home, A1C 9.4% on admission  - on admission glucose 1029, pH 7.13, bicarb 8, AG 37, BHB 9, K 6.0   - patient received insulin drip at 9units per hour while in MICU, adjusted per DKA protocol  - s/p 2L of fluids, now on D5 until patient eats  - PO diet was started, patient received 18 of Lantus in the AM  - sugars elevated in the AM, but improving throughout the day    PLAN  - ctm sugars with meals and bedtime - goal 140 - 180;   - endocrinology recommendations appreciated  - Lantus 30 sq for bedtime basal insulin  - Admelog 8 before breakfast, 5 before lunch, and 8 before dinner    -- these are discharge recs as well and will follow with endocrine outpatient  - LDISS for both meals and bedtime  - DISCHARGE: will not d/c on a pump.  pt is willing to be discharged on insulin pens.  She knows how to inject - dose TBD.

## 2021-12-27 NOTE — PROGRESS NOTE ADULT - PROBLEM SELECTOR PLAN 4
#Acute metabolic encephalopathy   - patient agitated and confused on admission (normally A+Ox3, on admission A+Ox0) - currently AOx3  - likely secondary to DKA   - infectious workup thus far has been negative, urine cultures are pending; will replete electrolytes as needed   - was receiving IV Haldol PRN in the ICU    PLAN  - AAOx3  - d/c Haldol Patient has Creon on med rec with meals and snacks, likely in the setting of pancreatic insufficiency due to prolonged diabetes.    PLAN  - c/w Creon 48508 units 2 capsules w/ breakfast, lunch, dinner  - c/w Creon 21992 units 1 capsule w/ snacks

## 2021-12-27 NOTE — PROVIDER CONTACT NOTE (OTHER) - ACTION/TREATMENT ORDERED:
Followed hypoglycemia protocol, gave apple juice, rechecked blood glucose levels every 15 minutes. Patient went from 53 to 96 after first 15 minutes, then to 145 after another 15 minutes.
MD notified.

## 2021-12-27 NOTE — SWALLOW BEDSIDE ASSESSMENT ADULT - ASR SWALLOW RECOMMEND DIAG
Cinesophagram to objectively assess swallow given patient's history of Parkinson's and patient reporting choking episodes with consumption of thin liquids/VFSS/MBS

## 2021-12-27 NOTE — PROVIDER CONTACT NOTE (OTHER) - BACKGROUND
Patient was admitted for type 1 diabetes mellitus with ketoacidosis without a coma. PMH of DM type 1, melanoma, thyroid cancer, thyroidectomy.
72 y/o with type 1 DM. pmh of melanoma, thyroid cancer.

## 2021-12-27 NOTE — PROGRESS NOTE ADULT - PROBLEM SELECTOR PLAN 10
#SIRS   - patient with leukocytosis and tachycardia on admission   - UA negative for infection, CXR clear lungs   - follow up RVP , COVID (neg), and blood cultures   - currently monitoring off antibiotics, higher suspicion for viral illness     PLAN  - urine culture is pending    -- greater than 3 organisms, possibly contaminated

## 2021-12-28 DIAGNOSIS — K13.79 OTHER LESIONS OF ORAL MUCOSA: ICD-10-CM

## 2021-12-28 LAB
ALBUMIN SERPL ELPH-MCNC: 2.8 G/DL — LOW (ref 3.3–5)
ALP SERPL-CCNC: 152 U/L — HIGH (ref 40–120)
ALT FLD-CCNC: 10 U/L — SIGNIFICANT CHANGE UP (ref 4–33)
ANION GAP SERPL CALC-SCNC: 7 MMOL/L — SIGNIFICANT CHANGE UP (ref 7–14)
AST SERPL-CCNC: 71 U/L — HIGH (ref 4–32)
BILIRUB SERPL-MCNC: 0.5 MG/DL — SIGNIFICANT CHANGE UP (ref 0.2–1.2)
BUN SERPL-MCNC: 18 MG/DL — SIGNIFICANT CHANGE UP (ref 7–23)
CALCIUM SERPL-MCNC: 9 MG/DL — SIGNIFICANT CHANGE UP (ref 8.4–10.5)
CHLORIDE SERPL-SCNC: 102 MMOL/L — SIGNIFICANT CHANGE UP (ref 98–107)
CO2 SERPL-SCNC: 28 MMOL/L — SIGNIFICANT CHANGE UP (ref 22–31)
CREAT SERPL-MCNC: 0.74 MG/DL — SIGNIFICANT CHANGE UP (ref 0.5–1.3)
GLUCOSE BLDC GLUCOMTR-MCNC: 162 MG/DL — HIGH (ref 70–99)
GLUCOSE BLDC GLUCOMTR-MCNC: 198 MG/DL — HIGH (ref 70–99)
GLUCOSE BLDC GLUCOMTR-MCNC: 201 MG/DL — HIGH (ref 70–99)
GLUCOSE BLDC GLUCOMTR-MCNC: 253 MG/DL — HIGH (ref 70–99)
GLUCOSE BLDC GLUCOMTR-MCNC: 269 MG/DL — HIGH (ref 70–99)
GLUCOSE BLDC GLUCOMTR-MCNC: 44 MG/DL — CRITICAL LOW (ref 70–99)
GLUCOSE BLDC GLUCOMTR-MCNC: 46 MG/DL — CRITICAL LOW (ref 70–99)
GLUCOSE SERPL-MCNC: 152 MG/DL — HIGH (ref 70–99)
HCT VFR BLD CALC: 36.8 % — SIGNIFICANT CHANGE UP (ref 34.5–45)
HGB BLD-MCNC: 11.8 G/DL — SIGNIFICANT CHANGE UP (ref 11.5–15.5)
MAGNESIUM SERPL-MCNC: 1.8 MG/DL — SIGNIFICANT CHANGE UP (ref 1.6–2.6)
MCHC RBC-ENTMCNC: 28.4 PG — SIGNIFICANT CHANGE UP (ref 27–34)
MCHC RBC-ENTMCNC: 32.1 GM/DL — SIGNIFICANT CHANGE UP (ref 32–36)
MCV RBC AUTO: 88.5 FL — SIGNIFICANT CHANGE UP (ref 80–100)
NRBC # BLD: 0 /100 WBCS — SIGNIFICANT CHANGE UP
NRBC # FLD: 0 K/UL — SIGNIFICANT CHANGE UP
PHOSPHATE SERPL-MCNC: 3.1 MG/DL — SIGNIFICANT CHANGE UP (ref 2.5–4.5)
PLATELET # BLD AUTO: 189 K/UL — SIGNIFICANT CHANGE UP (ref 150–400)
POTASSIUM SERPL-MCNC: 4.3 MMOL/L — SIGNIFICANT CHANGE UP (ref 3.5–5.3)
POTASSIUM SERPL-SCNC: 4.3 MMOL/L — SIGNIFICANT CHANGE UP (ref 3.5–5.3)
PROT SERPL-MCNC: 5.4 G/DL — LOW (ref 6–8.3)
RBC # BLD: 4.16 M/UL — SIGNIFICANT CHANGE UP (ref 3.8–5.2)
RBC # FLD: 17.2 % — HIGH (ref 10.3–14.5)
SODIUM SERPL-SCNC: 137 MMOL/L — SIGNIFICANT CHANGE UP (ref 135–145)
WBC # BLD: 7.97 K/UL — SIGNIFICANT CHANGE UP (ref 3.8–10.5)
WBC # FLD AUTO: 7.97 K/UL — SIGNIFICANT CHANGE UP (ref 3.8–10.5)

## 2021-12-28 PROCEDURE — 74230 X-RAY XM SWLNG FUNCJ C+: CPT | Mod: 26

## 2021-12-28 PROCEDURE — 99239 HOSP IP/OBS DSCHRG MGMT >30: CPT

## 2021-12-28 PROCEDURE — 99232 SBSQ HOSP IP/OBS MODERATE 35: CPT

## 2021-12-28 RX ORDER — BENZOCAINE AND MENTHOL 5; 1 G/100ML; G/100ML
1 LIQUID ORAL EVERY 4 HOURS
Refills: 0 | Status: DISCONTINUED | OUTPATIENT
Start: 2021-12-28 | End: 2021-12-31

## 2021-12-28 RX ORDER — INSULIN LISPRO 100/ML
2 VIAL (ML) SUBCUTANEOUS
Refills: 0 | Status: DISCONTINUED | OUTPATIENT
Start: 2021-12-28 | End: 2021-12-29

## 2021-12-28 RX ORDER — INSULIN LISPRO 100/ML
4 VIAL (ML) SUBCUTANEOUS
Refills: 0 | Status: DISCONTINUED | OUTPATIENT
Start: 2021-12-28 | End: 2021-12-28

## 2021-12-28 RX ORDER — DEXTROSE 50 % IN WATER 50 %
25 SYRINGE (ML) INTRAVENOUS ONCE
Refills: 0 | Status: COMPLETED | OUTPATIENT
Start: 2021-12-28 | End: 2021-12-28

## 2021-12-28 RX ORDER — HEPARIN SODIUM 5000 [USP'U]/ML
7500 INJECTION INTRAVENOUS; SUBCUTANEOUS EVERY 8 HOURS
Refills: 0 | Status: DISCONTINUED | OUTPATIENT
Start: 2021-12-28 | End: 2021-12-31

## 2021-12-28 RX ORDER — INSULIN LISPRO 100/ML
5 VIAL (ML) SUBCUTANEOUS
Refills: 0 | Status: DISCONTINUED | OUTPATIENT
Start: 2021-12-28 | End: 2021-12-28

## 2021-12-28 RX ADMIN — Medication 1: at 22:27

## 2021-12-28 RX ADMIN — CARBIDOPA AND LEVODOPA 1 TABLET(S): 25; 100 TABLET ORAL at 12:41

## 2021-12-28 RX ADMIN — Medication 25 GRAM(S): at 18:11

## 2021-12-28 RX ADMIN — Medication 3: at 09:52

## 2021-12-28 RX ADMIN — DIPHENHYDRAMINE HYDROCHLORIDE AND LIDOCAINE HYDROCHLORIDE AND ALUMINUM HYDROXIDE AND MAGNESIUM HYDRO 10 MILLILITER(S): KIT at 18:10

## 2021-12-28 RX ADMIN — DIPHENHYDRAMINE HYDROCHLORIDE AND LIDOCAINE HYDROCHLORIDE AND ALUMINUM HYDROXIDE AND MAGNESIUM HYDRO 10 MILLILITER(S): KIT at 06:11

## 2021-12-28 RX ADMIN — LOSARTAN POTASSIUM 100 MILLIGRAM(S): 100 TABLET, FILM COATED ORAL at 06:09

## 2021-12-28 RX ADMIN — HEPARIN SODIUM 7500 UNIT(S): 5000 INJECTION INTRAVENOUS; SUBCUTANEOUS at 06:10

## 2021-12-28 RX ADMIN — Medication 6 CAPSULE(S): at 12:41

## 2021-12-28 RX ADMIN — Medication 1 SPRAY(S): at 06:11

## 2021-12-28 RX ADMIN — Medication 6 CAPSULE(S): at 18:11

## 2021-12-28 RX ADMIN — HEPARIN SODIUM 7500 UNIT(S): 5000 INJECTION INTRAVENOUS; SUBCUTANEOUS at 13:02

## 2021-12-28 RX ADMIN — RASAGILINE 1 MILLIGRAM(S): 0.5 TABLET ORAL at 12:42

## 2021-12-28 RX ADMIN — Medication 125 MICROGRAM(S): at 06:09

## 2021-12-28 RX ADMIN — LORATADINE 10 MILLIGRAM(S): 10 TABLET ORAL at 12:41

## 2021-12-28 RX ADMIN — Medication 0.5 MILLIGRAM(S): at 22:27

## 2021-12-28 RX ADMIN — Medication 1: at 12:40

## 2021-12-28 RX ADMIN — CHLORHEXIDINE GLUCONATE 1 APPLICATION(S): 213 SOLUTION TOPICAL at 12:40

## 2021-12-28 RX ADMIN — CARBIDOPA AND LEVODOPA 1 TABLET(S): 25; 100 TABLET ORAL at 06:09

## 2021-12-28 RX ADMIN — PANTOPRAZOLE SODIUM 40 MILLIGRAM(S): 20 TABLET, DELAYED RELEASE ORAL at 06:09

## 2021-12-28 RX ADMIN — ATORVASTATIN CALCIUM 40 MILLIGRAM(S): 80 TABLET, FILM COATED ORAL at 22:27

## 2021-12-28 RX ADMIN — Medication 10 UNIT(S): at 09:52

## 2021-12-28 RX ADMIN — INSULIN GLARGINE 30 UNIT(S): 100 INJECTION, SOLUTION SUBCUTANEOUS at 09:51

## 2021-12-28 RX ADMIN — Medication 1 SPRAY(S): at 18:10

## 2021-12-28 RX ADMIN — ESCITALOPRAM OXALATE 10 MILLIGRAM(S): 10 TABLET, FILM COATED ORAL at 12:41

## 2021-12-28 RX ADMIN — Medication 6 CAPSULE(S): at 10:02

## 2021-12-28 RX ADMIN — Medication 4 UNIT(S): at 12:39

## 2021-12-28 RX ADMIN — CARBIDOPA AND LEVODOPA 1 TABLET(S): 25; 100 TABLET ORAL at 22:27

## 2021-12-28 RX ADMIN — HEPARIN SODIUM 7500 UNIT(S): 5000 INJECTION INTRAVENOUS; SUBCUTANEOUS at 22:26

## 2021-12-28 NOTE — PROGRESS NOTE ADULT - SUBJECTIVE AND OBJECTIVE BOX
Chris Gaxiola MD  Internal Medicine PGY-1  Pager NS: [phone] // [facility]: [phone]    PROGRESS NOTE:     Patient is a 71y old  Female who presents with a chief complaint of Hyperglycemia (27 Dec 2021 13:19)      SUBJECTIVE / OVERNIGHT EVENTS:    No acute events overnight. Patient examined at bedside with no acute complaints.     REVIEW OF SYSTEMS:    CONSTITUTIONAL: No weakness, fevers or chills  EYES/ENT: No visual changes;  No vertigo or throat pain   NECK: No pain or stiffness  RESPIRATORY: No cough, wheezing, hemoptysis; No shortness of breath  CARDIOVASCULAR: No chest pain or palpitations  GASTROINTESTINAL: No abdominal or epigastric pain. No nausea, vomiting, or hematemesis; No diarrhea or constipation. No melena or hematochezia.  GENITOURINARY: No dysuria, frequency or hematuria  NEUROLOGICAL: No numbness or weakness  SKIN: No itching, rashes      MEDICATIONS  (STANDING):  atorvastatin 40 milliGRAM(s) Oral at bedtime  carbidopa/levodopa  25/100 1 Tablet(s) Oral three times a day  chlorhexidine 2% Cloths 1 Application(s) Topical daily  dextrose 40% Gel 15 Gram(s) Oral once  dextrose 5%. 1000 milliLiter(s) (50 mL/Hr) IV Continuous <Continuous>  dextrose 5%. 1000 milliLiter(s) (100 mL/Hr) IV Continuous <Continuous>  dextrose 50% Injectable 12.5 Gram(s) IV Push once  dextrose 50% Injectable 25 Gram(s) IV Push once  dextrose 50% Injectable 25 Gram(s) IV Push once  escitalopram 10 milliGRAM(s) Oral daily  FIRST- Mouthwash  BLM 10 milliLiter(s) Swish and Spit every 12 hours  fluticasone propionate 50 MICROgram(s)/spray Nasal Spray 1 Spray(s) Both Nostrils two times a day  glucagon  Injectable 1 milliGRAM(s) IntraMuscular once  heparin   Injectable 7500 Unit(s) SubCutaneous every 8 hours  insulin glargine Injectable (LANTUS) 30 Unit(s) SubCutaneous <User Schedule>  insulin lispro (ADMELOG) corrective regimen sliding scale   SubCutaneous three times a day before meals  insulin lispro (ADMELOG) corrective regimen sliding scale   SubCutaneous at bedtime  insulin lispro Injectable (ADMELOG) 8 Unit(s) SubCutaneous before dinner  insulin lispro Injectable (ADMELOG) 4 Unit(s) SubCutaneous before lunch  insulin lispro Injectable (ADMELOG) 10 Unit(s) SubCutaneous before breakfast  levothyroxine 125 MICROGram(s) Oral daily  loratadine 10 milliGRAM(s) Oral daily  LORazepam     Tablet 0.5 milliGRAM(s) Oral at bedtime  losartan 100 milliGRAM(s) Oral daily  pancrelipase  (CREON 12,000 Lipase Units) 6 Capsule(s) Oral three times a day with meals  pantoprazole    Tablet 40 milliGRAM(s) Oral before breakfast  rasagiline Tablet 1 milliGRAM(s) Oral daily    MEDICATIONS  (PRN):  acetaminophen     Tablet .. 650 milliGRAM(s) Oral every 6 hours PRN Temp greater or equal to 38C (100.4F), Mild Pain (1 - 3)  haloperidol    Injectable 2 milliGRAM(s) IV Push every 6 hours PRN Agitation  sodium chloride 0.65% Nasal 1 Spray(s) Both Nostrils two times a day PRN Nasal Congestion      CAPILLARY BLOOD GLUCOSE      POCT Blood Glucose.: 162 mg/dL (28 Dec 2021 00:53)  POCT Blood Glucose.: 77 mg/dL (27 Dec 2021 22:34)  POCT Blood Glucose.: 87 mg/dL (27 Dec 2021 17:59)  POCT Blood Glucose.: 267 mg/dL (27 Dec 2021 12:52)  POCT Blood Glucose.: 363 mg/dL (27 Dec 2021 08:53)    I&O's Summary    27 Dec 2021 07:01  -  28 Dec 2021 07:00  --------------------------------------------------------  IN: 480 mL / OUT: 1600 mL / NET: -1120 mL        VITALS:   T(C): 37 (12-27-21 @ 22:30), Max: 37 (12-27-21 @ 22:30)  HR: 84 (12-27-21 @ 22:30) (84 - 90)  BP: 144/69 (12-27-21 @ 22:30) (124/58 - 144/69)  RR: 18 (12-27-21 @ 22:30) (18 - 18)  SpO2: 97% (12-27-21 @ 22:30) (96% - 97%)    GENERAL: NAD, lying in bed comfortably  HEAD:  Atraumatic, normocephalic  EYES: EOMI, PERRLA, conjunctiva and sclera clear  ENT: Moist mucous membranes  NECK: Supple, no JVD  HEART: Regular rate and rhythm, no murmurs, rubs, or gallops  LUNGS: Unlabored respirations.  Clear to auscultation bilaterally, no crackles, wheezing, or rhonchi  ABDOMEN: Soft, nontender, nondistended, +BS  EXTREMITIES: 2+ peripheral pulses bilaterally. No clubbing, cyanosis, or edema  NERVOUS SYSTEM:  A&Ox3, no focal deficits   SKIN: No rashes or lesions    LABS:                        11.8   7.97  )-----------( 189      ( 28 Dec 2021 07:01 )             36.8     12-27    134<L>  |  100  |  19  ----------------------------<  330<H>  4.6   |  23  |  0.75    Ca    8.7      27 Dec 2021 06:58  Phos  2.7     12-27  Mg     1.80     12-27    TPro  5.1<L>  /  Alb  2.7<L>  /  TBili  0.6  /  DBili  x   /  AST  36<H>  /  ALT  9   /  AlkPhos  132<H>  12-27                RADIOLOGY & ADDITIONAL TESTS:  Results Reviewed:   Imaging Personally Reviewed:  Electrocardiogram Personally Reviewed:    COORDINATION OF CARE:  Care Discussed with Consultants/Other Providers [Y/N]:  Prior or Outpatient Records Reviewed [Y/N]:   Chris Gaxiola MD  Internal Medicine PGY-1  Pager NS: [phone] // [facility]: [phone]    PROGRESS NOTE:     Patient is a 71y old  Female who presents with a chief complaint of Hyperglycemia (27 Dec 2021 13:19)      SUBJECTIVE / OVERNIGHT EVENTS:    No acute events overnight. Patient examined at bedside with no acute complaints. Patient went for cinesophagram this AM. Still complaining of pain on roof of mouth not relieved with magic mouthwash    REVIEW OF SYSTEMS:    CONSTITUTIONAL: No weakness, fevers or chills  EYES/ENT: No visual changes;  No vertigo or throat pain, +mouth pain on roof of mouth +pain on swallowing  NECK: No pain or stiffness  RESPIRATORY: No cough, wheezing, hemoptysis; No shortness of breath  CARDIOVASCULAR: No chest pain or palpitations  GASTROINTESTINAL: No abdominal or epigastric pain. No nausea, vomiting, or hematemesis; No diarrhea or constipation. No melena or hematochezia.  GENITOURINARY: No dysuria, frequency or hematuria  NEUROLOGICAL: No numbness or weakness  SKIN: No itching, rashes      MEDICATIONS  (STANDING):  atorvastatin 40 milliGRAM(s) Oral at bedtime  carbidopa/levodopa  25/100 1 Tablet(s) Oral three times a day  chlorhexidine 2% Cloths 1 Application(s) Topical daily  dextrose 40% Gel 15 Gram(s) Oral once  dextrose 5%. 1000 milliLiter(s) (50 mL/Hr) IV Continuous <Continuous>  dextrose 5%. 1000 milliLiter(s) (100 mL/Hr) IV Continuous <Continuous>  dextrose 50% Injectable 12.5 Gram(s) IV Push once  dextrose 50% Injectable 25 Gram(s) IV Push once  dextrose 50% Injectable 25 Gram(s) IV Push once  escitalopram 10 milliGRAM(s) Oral daily  FIRST- Mouthwash  BLM 10 milliLiter(s) Swish and Spit every 12 hours  fluticasone propionate 50 MICROgram(s)/spray Nasal Spray 1 Spray(s) Both Nostrils two times a day  glucagon  Injectable 1 milliGRAM(s) IntraMuscular once  heparin   Injectable 7500 Unit(s) SubCutaneous every 8 hours  insulin glargine Injectable (LANTUS) 30 Unit(s) SubCutaneous <User Schedule>  insulin lispro (ADMELOG) corrective regimen sliding scale   SubCutaneous three times a day before meals  insulin lispro (ADMELOG) corrective regimen sliding scale   SubCutaneous at bedtime  insulin lispro Injectable (ADMELOG) 8 Unit(s) SubCutaneous before dinner  insulin lispro Injectable (ADMELOG) 4 Unit(s) SubCutaneous before lunch  insulin lispro Injectable (ADMELOG) 10 Unit(s) SubCutaneous before breakfast  levothyroxine 125 MICROGram(s) Oral daily  loratadine 10 milliGRAM(s) Oral daily  LORazepam     Tablet 0.5 milliGRAM(s) Oral at bedtime  losartan 100 milliGRAM(s) Oral daily  pancrelipase  (CREON 12,000 Lipase Units) 6 Capsule(s) Oral three times a day with meals  pantoprazole    Tablet 40 milliGRAM(s) Oral before breakfast  rasagiline Tablet 1 milliGRAM(s) Oral daily    MEDICATIONS  (PRN):  acetaminophen     Tablet .. 650 milliGRAM(s) Oral every 6 hours PRN Temp greater or equal to 38C (100.4F), Mild Pain (1 - 3)  haloperidol    Injectable 2 milliGRAM(s) IV Push every 6 hours PRN Agitation  sodium chloride 0.65% Nasal 1 Spray(s) Both Nostrils two times a day PRN Nasal Congestion      CAPILLARY BLOOD GLUCOSE      POCT Blood Glucose.: 162 mg/dL (28 Dec 2021 00:53)  POCT Blood Glucose.: 77 mg/dL (27 Dec 2021 22:34)  POCT Blood Glucose.: 87 mg/dL (27 Dec 2021 17:59)  POCT Blood Glucose.: 267 mg/dL (27 Dec 2021 12:52)  POCT Blood Glucose.: 363 mg/dL (27 Dec 2021 08:53)    I&O's Summary    27 Dec 2021 07:01  -  28 Dec 2021 07:00  --------------------------------------------------------  IN: 480 mL / OUT: 1600 mL / NET: -1120 mL        VITALS:   T(C): 37 (12-27-21 @ 22:30), Max: 37 (12-27-21 @ 22:30)  HR: 84 (12-27-21 @ 22:30) (84 - 90)  BP: 144/69 (12-27-21 @ 22:30) (124/58 - 144/69)  RR: 18 (12-27-21 @ 22:30) (18 - 18)  SpO2: 97% (12-27-21 @ 22:30) (96% - 97%)    GENERAL: NAD, lying in bed comfortably  HEAD:  Atraumatic, normocephalic  EYES: EOMI, PERRLA, conjunctiva and sclera clear  ENT: Moist mucous membranes  NECK: Supple, no JVD  HEART: Regular rate and rhythm, no murmurs, rubs, or gallops  LUNGS: Unlabored respirations.  Clear to auscultation bilaterally, no crackles, wheezing, or rhonchi  ABDOMEN: Soft, nontender, nondistended, +BS  EXTREMITIES: 2+ peripheral pulses bilaterally. No clubbing, cyanosis, or edema  NERVOUS SYSTEM:  A&Ox3, no focal deficits   SKIN: No rashes or lesions    LABS:                        11.8   7.97  )-----------( 189      ( 28 Dec 2021 07:01 )             36.8     12-27    134<L>  |  100  |  19  ----------------------------<  330<H>  4.6   |  23  |  0.75    Ca    8.7      27 Dec 2021 06:58  Phos  2.7     12-27  Mg     1.80     12-27    TPro  5.1<L>  /  Alb  2.7<L>  /  TBili  0.6  /  DBili  x   /  AST  36<H>  /  ALT  9   /  AlkPhos  132<H>  12-27                RADIOLOGY & ADDITIONAL TESTS:  Results Reviewed:   Imaging Personally Reviewed:  Electrocardiogram Personally Reviewed:    COORDINATION OF CARE:  Care Discussed with Consultants/Other Providers [Y/N]:  Prior or Outpatient Records Reviewed [Y/N]:

## 2021-12-28 NOTE — SWALLOW VFSS/MBS ASSESSMENT ADULT - ROSENBEK'S PENETRATION ASPIRATION SCALE
(1) no aspiration, contrast does not enter airway P/A 1 small single/consecutive cup sip; P/A 3 to 5 large/consecutive cup sip drinking

## 2021-12-28 NOTE — PROGRESS NOTE ADULT - ASSESSMENT
71 year old woman with T1DM, uncontrolled, previously on insulin pump, admitted with DKA    1) T1DM, uncontrolled  DKA resolved  Pt with reasonable fasting glucose today, Continue Lantus 30 units  Pt with hypoglycemia after lunch yesterday, decrease pre-lunch admelog to 4 units, continue with 10 units before breakfast and 8 units before dinner.  Continue admelog correction scale  Inpatient glucose goal 100 - 180 mg/dl      Plan to discharge on basal bolus insulin, if leaving today, then use doses above, switch to Toujeo for basal insulin and Apidra for prandial as she had been using them in the past  She was having difficulty using the insulin pump, particularly filling the reservoir with insertion site changes, would like to continue on insulin injections  She will follow up with Dr Sophia Terrell post discharge, and will change to carbohydrate based dosing for prandial insulin    2) Hyperlipidemia   - continue atorvastatin    3) Thyroid cancer, hypothyroidism  Continue levothyroxine 125 micrograms po daily        Pat Cano MD  pager  on 12/28  Diabetes team: [phone] business hours  [phone] night/weekend

## 2021-12-28 NOTE — PROGRESS NOTE ADULT - PROBLEM SELECTOR PLAN 10
#SIRS   - patient with leukocytosis and tachycardia on admission   - UA negative for infection, CXR clear lungs   - follow up RVP , COVID (neg), and blood cultures   - currently monitoring off antibiotics, higher suspicion for viral illness     PLAN  - urine culture is pending    -- greater than 3 organisms, possibly contaminated #Hypothyroidism   - patient with thyroid cancer s/p resection in 2000 now with hypothyroidism   - follows with Dr. Ennis outpatient, currently on tirosint levothyroxine sodium) 125mcg daily   - TSH 10/2021 1.00,  TSH here WNL    PLAN  - c/w PO levothyroxine 125mcg daily.

## 2021-12-28 NOTE — PROGRESS NOTE ADULT - PROBLEM SELECTOR PLAN 9
#Hypothyroidism   - patient with thyroid cancer s/p resection in 2000 now with hypothyroidism   - follows with Dr. Ennis outpatient, currently on tirosint levothyroxine sodium) 125mcg daily   - 100 mg IV levothyroxine equivalent   - TSH 10/2021 1.00,  TSH here WNL    PLAN  - c/w IV levothyroxine 100 #Hypothyroidism   - patient with thyroid cancer s/p resection in 2000 now with hypothyroidism   - follows with Dr. Ennis outpatient, currently on tirosint levothyroxine sodium) 125mcg daily   - TSH 10/2021 1.00,  TSH here WNL    PLAN  - c/w PO levothyroxine 125 daily. #AZALEA   - patient with BUN 63, Cr 2.14 on admission (baseline creatinine 0.8),   - likely pre-renal in the setting of DKA   - will treat DKA and continue to trend BUN and creatinine  #Anion gap metabolic acidosis   - on admission pH 7.13, bicarb 8, AG 37   - likely multifactorial in origin with DKA, uremia and lactate contributing   - will continue to trend lactate and treat DKA and pre-renal AZALEA  - gap is 13 --> 10 now   #hypophosphatemia  - replete 12/22    PLAN  - Cr at 0.74 today, back to baseline  - gap decreased to 7, Cr still appropriate  - electrolytes wnl  - trend BMP and replete as required

## 2021-12-28 NOTE — SWALLOW VFSS/MBS ASSESSMENT ADULT - PHARYNGEAL PHASE COMMENTS
adequate initiation of the pharyngeal swallow, adequate laryngeal elevation, adequate tongue base retraction, adequate pharyngeal constriction mild delayed initiation of the pharyngeal swallow, reduced laryngeal elevation, adequate tongue base retraction, adequate pharyngeal constriction

## 2021-12-28 NOTE — PROGRESS NOTE ADULT - PROBLEM SELECTOR PLAN 8
#AZALEA   - patient with BUN 63, Cr 2.14 on admission (baseline creatinine 0.8),   - likely pre-renal in the setting of DKA   - will treat DKA and continue to trend BUN and creatinine  #Anion gap metabolic acidosis   - on admission pH 7.13, bicarb 8, AG 37   - likely multifactorial in origin with DKA, uremia and lactate contributing   - will continue to trend lactate and treat DKA and pre-renal AZALEA  - gap is 13 --> 10 now   #hypophosphatemia  - replete 12/22    PLAN  - Cr at 0.72 today, back to baseline  - gap decreased 16 -> 11, Cr still appropriate  - electrolytes wnl  - trend BMP and replete as required Patient on atorvastatin 40    PLAN  - c/w home medication

## 2021-12-28 NOTE — SWALLOW VFSS/MBS ASSESSMENT ADULT - RECOMMENDED CONSISTENCY
1.) Regular with Thin Liquids  2.) Feeding/Swallowing Guidelines: Upright position, small bites 1.) Regular with Thin Liquids  2.) Feeding/Swallowing Guidelines: Upright position, small bites; chew solids well, small single cup sip of Thin Liquids  3.) Aspiration Precautions  4.) Reflux Precautions  5.) Maintain Good Oral Hygiene Care

## 2021-12-28 NOTE — PROGRESS NOTE ADULT - PROBLEM SELECTOR PLAN 2
-pt reported choking with liquids yesterday  -cinesophogram ordered -pt reported choking with liquids 12/26  -cinesophogram ordered and completed  -no issues with choking today. -pt reported choking with liquids 12/26  -cinesophogram ordered and completed  -no issues with choking today.  - recommendations include:  - regular diet with thin liquids  - upright position, small bites, chewing solids well, small single cup sip of thin liquids  - asp and reflux precautions  - good oral hygeine

## 2021-12-28 NOTE — PROGRESS NOTE ADULT - PROBLEM SELECTOR PLAN 4
Patient has Creon on med rec with meals and snacks, likely in the setting of pancreatic insufficiency due to prolonged diabetes.    PLAN  - c/w Creon 15590 units 2 capsules w/ breakfast, lunch, dinner  - c/w Creon 56476 units 1 capsule w/ snacks Patient has Creon on med rec with meals and snacks, likely in the setting of pancreatic insufficiency due to prolonged diabetes.    PLAN  - c/w Creon 38215 units 6 capsules w/ breakfast, lunch, dinner  - c/w Creon 63729 units 3 capsule w/ snacks #HTN   - blood pressure currently well controlled   - On losartan 100 mg at home    PLAN  - pressures have been stable  - c/w Losartan 100mg qdaily

## 2021-12-28 NOTE — PROGRESS NOTE ADULT - SUBJECTIVE AND OBJECTIVE BOX
Diabetes  Follow up note    Interval History: Pt with discomfort on roof of mouth especially when swallowing.  Eating full meals.  Has swallowing study this morning, ate cookie (prior to AM poc gluc and insulin)    MEDICATIONS  (STANDING):  atorvastatin 40 milliGRAM(s) Oral at bedtime  carbidopa/levodopa  25/100 1 Tablet(s) Oral three times a day  chlorhexidine 2% Cloths 1 Application(s) Topical daily  dextrose 40% Gel 15 Gram(s) Oral once  dextrose 5%. 1000 milliLiter(s) (100 mL/Hr) IV Continuous <Continuous>  dextrose 5%. 1000 milliLiter(s) (50 mL/Hr) IV Continuous <Continuous>  dextrose 50% Injectable 25 Gram(s) IV Push once  dextrose 50% Injectable 12.5 Gram(s) IV Push once  dextrose 50% Injectable 25 Gram(s) IV Push once  escitalopram 10 milliGRAM(s) Oral daily  FIRST- Mouthwash  BLM 10 milliLiter(s) Swish and Spit every 12 hours  fluticasone propionate 50 MICROgram(s)/spray Nasal Spray 1 Spray(s) Both Nostrils two times a day  glucagon  Injectable 1 milliGRAM(s) IntraMuscular once  heparin   Injectable 7500 Unit(s) SubCutaneous every 8 hours  insulin glargine Injectable (LANTUS) 30 Unit(s) SubCutaneous <User Schedule>  insulin lispro (ADMELOG) corrective regimen sliding scale   SubCutaneous three times a day before meals  insulin lispro (ADMELOG) corrective regimen sliding scale   SubCutaneous at bedtime  insulin lispro Injectable (ADMELOG) 8 Unit(s) SubCutaneous before dinner  insulin lispro Injectable (ADMELOG) 10 Unit(s) SubCutaneous before breakfast  insulin lispro Injectable (ADMELOG) 5 Unit(s) SubCutaneous before lunch  levothyroxine 125 MICROGram(s) Oral daily  loratadine 10 milliGRAM(s) Oral daily  LORazepam     Tablet 0.5 milliGRAM(s) Oral at bedtime  losartan 100 milliGRAM(s) Oral daily  pancrelipase  (CREON 12,000 Lipase Units) 6 Capsule(s) Oral three times a day with meals  pantoprazole    Tablet 40 milliGRAM(s) Oral before breakfast  rasagiline Tablet 1 milliGRAM(s) Oral daily    MEDICATIONS  (PRN):  acetaminophen     Tablet .. 650 milliGRAM(s) Oral every 6 hours PRN Temp greater or equal to 38C (100.4F), Mild Pain (1 - 3)  haloperidol    Injectable 2 milliGRAM(s) IV Push every 6 hours PRN Agitation  sodium chloride 0.65% Nasal 1 Spray(s) Both Nostrils two times a day PRN Nasal Congestion      Allergies    Ceclor (Unknown)  iodine containing compounds (Unknown)  shellfish (Unknown)    Intolerances          PHYSICAL EXAM:  VITALS: T(C): 37 (12-27-21 @ 22:30)  T(F): 98.6 (12-27-21 @ 22:30), Max: 98.6 (12-27-21 @ 22:30)  HR: 84 (12-27-21 @ 22:30) (84 - 90)  BP: 144/69 (12-27-21 @ 22:30) (124/58 - 144/69)  RR:  (18 - 18)  SpO2:  (96% - 97%)  GENERAL: Lying in bed in NAD,   EYES: No proptosis,  HEENT:  Atraumatic, Normocephalic,   RESPIRATORY: Clear to auscultation bilaterally  CARDIOVASCULAR: Regular rhythm; no peripheral edema  GI: Soft, nontender, non distended, normal bowel sounds        POCT Blood Glucose.: 269 mg/dL (12-28-21 @ 09:33)  [After cookie, AM venous glucose 152 mg/dl]  POCT Blood Glucose.: 162 mg/dL (12-28-21 @ 00:53)  POCT Blood Glucose.: 77 mg/dL (12-27-21 @ 22:34)  POCT Blood Glucose.: 87 mg/dL (12-27-21 @ 17:59)  POCT Blood Glucose.: 267 mg/dL (12-27-21 @ 12:52)  POCT Blood Glucose.: 363 mg/dL (12-27-21 @ 08:53)  POCT Blood Glucose.: 104 mg/dL (12-26-21 @ 22:28)  POCT Blood Glucose.: 207 mg/dL (12-26-21 @ 17:37)  POCT Blood Glucose.: 227 mg/dL (12-26-21 @ 13:01)  POCT Blood Glucose.: 392 mg/dL (12-26-21 @ 09:34)  POCT Blood Glucose.: 205 mg/dL (12-25-21 @ 22:38)  POCT Blood Glucose.: 91 mg/dL (12-25-21 @ 17:55)  POCT Blood Glucose.: 256 mg/dL (12-25-21 @ 12:22)        12-28    137  |  102  |  18  ----------------------------<  152<H>  4.3   |  28  |  0.74    EGFR if : 93  EGFR if non : 80    Ca    9.0      12-28  Mg     1.80     12-28  Phos  3.1     12-28    TPro  5.4<L>  /  Alb  2.8<L>  /  TBili  0.5  /  DBili  x   /  AST  71<H>  /  ALT  10  /  AlkPhos  152<H>  12-28        A1C with Estimated Average Glucose Result: 9.4 % (12-20-21 @ 19:29)

## 2021-12-28 NOTE — PROGRESS NOTE ADULT - PROBLEM SELECTOR PLAN 5
#Acute metabolic encephalopathy   - patient agitated and confused on admission (normally A+Ox3, on admission A+Ox0) - currently AOx3  - likely secondary to DKA   - infectious workup thus far has been negative, urine cultures are pending; will replete electrolytes as needed   - was receiving IV Haldol PRN in the ICU    PLAN  - AAOx3  - d/c Haldol Patient has Creon on med rec with meals and snacks, likely in the setting of pancreatic insufficiency due to prolonged diabetes.    PLAN  - c/w Creon 62586 units 6 capsules w/ breakfast, lunch, dinner  - c/w Creon 71852 units 3 capsule w/ snacks

## 2021-12-28 NOTE — SWALLOW VFSS/MBS ASSESSMENT ADULT - DIAGNOSTIC IMPRESSIONS
Patient presents with Functional Oral and Mild Pharyngeal Stage Dysphagia. The Oral Stage is characterized by adequate oral containment, slow chewing for solid, adequate bolus manipulation, adequate tongue motion with adequate anterior to posterior transfer of the bolus with adequate oral clearance post swallow.  The Pharyngeal Stage is characterized by mild delayed initiation of the pharyngeal swallow (Bolus head is over base of tongue/vallecular for Thin Liquids), reduced laryngeal elevation with incomplete laryngeal vestibular closure, adequate tongue base retraction and adequate pharyngeal constriction. There is adequate pharyngeal clearance post swallow for puree/solids/liquid consistencies.  There was an episode of Laryngeal Penetration during the swallow for Thin Liquids via consecutive cup sip drinking self administered without complete retrieval to the level of the vocal folds. Patient is sensate to the Penetration given a weak reflexive cough response.  There was No Aspiration observed before, during or after the swallow for Puree/Solids/Mildly Thick Liquids/Thin Liquids via small single cup sip. Patient presents with Functional Oral and Mild Pharyngeal Stage Dysphagia. The Oral Stage is characterized by adequate oral containment, slow chewing for solid, adequate bolus manipulation, adequate tongue motion with adequate anterior to posterior transfer of the bolus with adequate oral clearance post swallow.  The Pharyngeal Stage is characterized by mild delayed initiation of the pharyngeal swallow (Bolus head is over base of tongue/vallecular for Thin Liquids), reduced laryngeal elevation with incomplete laryngeal vestibular closure, adequate tongue base retraction and adequate pharyngeal constriction. There is adequate pharyngeal clearance post swallow for puree/solids/liquid consistencies.  There was an episode of Trace Laryngeal Penetration during the swallow for Thin Liquids via consecutive cup sip drinking self administered without complete retrieval to the level of the vocal folds. Patient is sensate to the Penetration given a weak reflexive cough response.  There was No Aspiration observed before, during or after the swallow for Puree/Solids/Mildly Thick Liquids/Thin Liquids via small single cup sip.

## 2021-12-28 NOTE — PROGRESS NOTE ADULT - ATTENDING COMMENTS
FS controlled on 30 basal + 10/548 pre-meal - changing pre lunch from 4 to 5 per Endo   cineesophagogram rec to cw reg diet  remain stable for DC to [facility]  Time spent 35 min

## 2021-12-28 NOTE — SWALLOW VFSS/MBS ASSESSMENT ADULT - COMMENTS
Medicine Note 12/28/2021 - 71F hx of T1DM (since age 38), Parkinsons (diagnosed 2017), bladder incontinence, anxiety/depression, HTN, HLD, thyroid cancer s/p resection in 2000 now with hypothyroidism presents to the ED d/t SOB, nausea, vomiting and AMS for one day. Patient found to be in DKA and admitted to the MICU for further management. Now improved and step down to the floor.    Of Note: Patient was seen for a Clinical Swallow Evaluation on 12/27/2021 (See Consult).     Patient transferred to a specialized seating unit with lateral view projection.

## 2021-12-28 NOTE — PROGRESS NOTE ADULT - PROBLEM SELECTOR PLAN 3
#HTN   - blood pressure currently well controlled   - On losartan 100 mg at home    PLAN  - pressures have been stable  - c/w Losartan 100mg qdaily -pt complaining of 1 day hx mouth pain making it difficult for her to swallow  -ordered cepacol lozenges

## 2021-12-28 NOTE — SWALLOW VFSS/MBS ASSESSMENT ADULT - ADDITIONAL RECOMMENDATIONS
Patient benefit swallowing therapy pending discharge plans (e.g. Rehab Center vs Home Care vs OutPatient at [facility] [phone]).

## 2021-12-28 NOTE — PROGRESS NOTE ADULT - PROBLEM SELECTOR PLAN 1
#Diabetes with DKA  - Patient with hx of DM1, non-complaint with basal bolus at home, A1C 9.4% on admission  - on admission glucose 1029, pH 7.13, bicarb 8, AG 37, BHB 9, K 6.0   - patient received insulin drip at 9units per hour while in MICU, adjusted per DKA protocol  - s/p 2L of fluids, now on D5 until patient eats  - PO diet was started, patient received 18 of Lantus in the AM  - sugars elevated in the AM, but improving throughout the day    PLAN  - ctm sugars with meals and bedtime - goal 140 - 180;   - endocrinology recommendations appreciated  - Lantus 30 sq for bedtime basal insulin  - Admelog 8 before breakfast, 5 before lunch, and 8 before dinner    -- these are discharge recs as well and will follow with endocrine outpatient  - LDISS for both meals and bedtime  - DISCHARGE: will not d/c on a pump.  pt is willing to be discharged on insulin pens.  She knows how to inject - dose TBD. #Diabetes with DKA  - Patient with hx of DM1, non-complaint with basal bolus at home, A1C 9.4% on admission  - on admission glucose 1029, pH 7.13, bicarb 8, AG 37, BHB 9, K 6.0   - patient received insulin drip at 9units per hour while in MICU, adjusted per DKA protocol    PLAN  - ctm sugars with meals and bedtime - goal 140 - 180;   - endocrinology recommendations appreciated  - Lantus 30 sq for bedtime basal insulin  - Admelog 10 before breakfast, 4 before lunch, and 8 before dinner    -- these are discharge recs as well and will follow with endocrine outpatient  - LDISS for both meals and bedtime  - DISCHARGE: will not d/c on a pump.  pt is willing to be discharged on insulin pens.  She knows how to inject - dose TBD.  - Pt. will f/u with Dr. Sophia Terrell post-discharge

## 2021-12-28 NOTE — PROGRESS NOTE ADULT - PROBLEM SELECTOR PLAN 7
Patient on atorvastatin 40    PLAN  - c/w home medication Patient has history of Parkinson. Med rec with sinamet  TID    PLAN  - c/w home medication

## 2021-12-29 LAB
ALBUMIN SERPL ELPH-MCNC: 2.9 G/DL — LOW (ref 3.3–5)
ALP SERPL-CCNC: 178 U/L — HIGH (ref 40–120)
ALT FLD-CCNC: 17 U/L — SIGNIFICANT CHANGE UP (ref 4–33)
ANION GAP SERPL CALC-SCNC: 10 MMOL/L — SIGNIFICANT CHANGE UP (ref 7–14)
AST SERPL-CCNC: 67 U/L — HIGH (ref 4–32)
BILIRUB SERPL-MCNC: 0.5 MG/DL — SIGNIFICANT CHANGE UP (ref 0.2–1.2)
BUN SERPL-MCNC: 17 MG/DL — SIGNIFICANT CHANGE UP (ref 7–23)
CALCIUM SERPL-MCNC: 9 MG/DL — SIGNIFICANT CHANGE UP (ref 8.4–10.5)
CHLORIDE SERPL-SCNC: 101 MMOL/L — SIGNIFICANT CHANGE UP (ref 98–107)
CO2 SERPL-SCNC: 26 MMOL/L — SIGNIFICANT CHANGE UP (ref 22–31)
CREAT SERPL-MCNC: 0.78 MG/DL — SIGNIFICANT CHANGE UP (ref 0.5–1.3)
GLUCOSE BLDC GLUCOMTR-MCNC: 142 MG/DL — HIGH (ref 70–99)
GLUCOSE BLDC GLUCOMTR-MCNC: 160 MG/DL — HIGH (ref 70–99)
GLUCOSE BLDC GLUCOMTR-MCNC: 197 MG/DL — HIGH (ref 70–99)
GLUCOSE BLDC GLUCOMTR-MCNC: 197 MG/DL — HIGH (ref 70–99)
GLUCOSE BLDC GLUCOMTR-MCNC: 46 MG/DL — CRITICAL LOW (ref 70–99)
GLUCOSE BLDC GLUCOMTR-MCNC: 59 MG/DL — LOW (ref 70–99)
GLUCOSE SERPL-MCNC: 163 MG/DL — HIGH (ref 70–99)
HCT VFR BLD CALC: 39.2 % — SIGNIFICANT CHANGE UP (ref 34.5–45)
HGB BLD-MCNC: 12.1 G/DL — SIGNIFICANT CHANGE UP (ref 11.5–15.5)
MAGNESIUM SERPL-MCNC: 1.8 MG/DL — SIGNIFICANT CHANGE UP (ref 1.6–2.6)
MCHC RBC-ENTMCNC: 28.5 PG — SIGNIFICANT CHANGE UP (ref 27–34)
MCHC RBC-ENTMCNC: 30.9 GM/DL — LOW (ref 32–36)
MCV RBC AUTO: 92.5 FL — SIGNIFICANT CHANGE UP (ref 80–100)
NRBC # BLD: 0 /100 WBCS — SIGNIFICANT CHANGE UP
NRBC # FLD: 0 K/UL — SIGNIFICANT CHANGE UP
PHOSPHATE SERPL-MCNC: 2.9 MG/DL — SIGNIFICANT CHANGE UP (ref 2.5–4.5)
PLATELET # BLD AUTO: 201 K/UL — SIGNIFICANT CHANGE UP (ref 150–400)
POTASSIUM SERPL-MCNC: 4 MMOL/L — SIGNIFICANT CHANGE UP (ref 3.5–5.3)
POTASSIUM SERPL-SCNC: 4 MMOL/L — SIGNIFICANT CHANGE UP (ref 3.5–5.3)
PROT SERPL-MCNC: 5.6 G/DL — LOW (ref 6–8.3)
RBC # BLD: 4.24 M/UL — SIGNIFICANT CHANGE UP (ref 3.8–5.2)
RBC # FLD: 17.3 % — HIGH (ref 10.3–14.5)
SARS-COV-2 RNA SPEC QL NAA+PROBE: SIGNIFICANT CHANGE UP
SODIUM SERPL-SCNC: 137 MMOL/L — SIGNIFICANT CHANGE UP (ref 135–145)
WBC # BLD: 10.05 K/UL — SIGNIFICANT CHANGE UP (ref 3.8–10.5)
WBC # FLD AUTO: 10.05 K/UL — SIGNIFICANT CHANGE UP (ref 3.8–10.5)

## 2021-12-29 PROCEDURE — 99232 SBSQ HOSP IP/OBS MODERATE 35: CPT

## 2021-12-29 PROCEDURE — 99232 SBSQ HOSP IP/OBS MODERATE 35: CPT | Mod: GC

## 2021-12-29 RX ORDER — INSULIN GLARGINE 100 [IU]/ML
32 INJECTION, SOLUTION SUBCUTANEOUS
Refills: 0 | Status: DISCONTINUED | OUTPATIENT
Start: 2021-12-30 | End: 2021-12-31

## 2021-12-29 RX ORDER — DEXTROSE 50 % IN WATER 50 %
25 SYRINGE (ML) INTRAVENOUS ONCE
Refills: 0 | Status: COMPLETED | OUTPATIENT
Start: 2021-12-29 | End: 2021-12-29

## 2021-12-29 RX ADMIN — Medication 6 CAPSULE(S): at 13:07

## 2021-12-29 RX ADMIN — Medication 1: at 08:58

## 2021-12-29 RX ADMIN — Medication 0.5 MILLIGRAM(S): at 22:54

## 2021-12-29 RX ADMIN — Medication 2 UNIT(S): at 13:09

## 2021-12-29 RX ADMIN — Medication 25 GRAM(S): at 17:41

## 2021-12-29 RX ADMIN — Medication 6 CAPSULE(S): at 08:59

## 2021-12-29 RX ADMIN — DIPHENHYDRAMINE HYDROCHLORIDE AND LIDOCAINE HYDROCHLORIDE AND ALUMINUM HYDROXIDE AND MAGNESIUM HYDRO 10 MILLILITER(S): KIT at 06:51

## 2021-12-29 RX ADMIN — CHLORHEXIDINE GLUCONATE 1 APPLICATION(S): 213 SOLUTION TOPICAL at 13:08

## 2021-12-29 RX ADMIN — RASAGILINE 1 MILLIGRAM(S): 0.5 TABLET ORAL at 13:07

## 2021-12-29 RX ADMIN — CARBIDOPA AND LEVODOPA 1 TABLET(S): 25; 100 TABLET ORAL at 22:54

## 2021-12-29 RX ADMIN — CARBIDOPA AND LEVODOPA 1 TABLET(S): 25; 100 TABLET ORAL at 13:07

## 2021-12-29 RX ADMIN — CARBIDOPA AND LEVODOPA 1 TABLET(S): 25; 100 TABLET ORAL at 06:52

## 2021-12-29 RX ADMIN — DIPHENHYDRAMINE HYDROCHLORIDE AND LIDOCAINE HYDROCHLORIDE AND ALUMINUM HYDROXIDE AND MAGNESIUM HYDRO 10 MILLILITER(S): KIT at 17:41

## 2021-12-29 RX ADMIN — LOSARTAN POTASSIUM 100 MILLIGRAM(S): 100 TABLET, FILM COATED ORAL at 06:51

## 2021-12-29 RX ADMIN — ESCITALOPRAM OXALATE 10 MILLIGRAM(S): 10 TABLET, FILM COATED ORAL at 13:07

## 2021-12-29 RX ADMIN — ATORVASTATIN CALCIUM 40 MILLIGRAM(S): 80 TABLET, FILM COATED ORAL at 22:54

## 2021-12-29 RX ADMIN — INSULIN GLARGINE 30 UNIT(S): 100 INJECTION, SOLUTION SUBCUTANEOUS at 08:59

## 2021-12-29 RX ADMIN — Medication 6 CAPSULE(S): at 17:41

## 2021-12-29 RX ADMIN — LORATADINE 10 MILLIGRAM(S): 10 TABLET ORAL at 13:07

## 2021-12-29 RX ADMIN — HEPARIN SODIUM 7500 UNIT(S): 5000 INJECTION INTRAVENOUS; SUBCUTANEOUS at 06:52

## 2021-12-29 RX ADMIN — HEPARIN SODIUM 7500 UNIT(S): 5000 INJECTION INTRAVENOUS; SUBCUTANEOUS at 22:54

## 2021-12-29 RX ADMIN — Medication 10 UNIT(S): at 08:58

## 2021-12-29 RX ADMIN — PANTOPRAZOLE SODIUM 40 MILLIGRAM(S): 20 TABLET, DELAYED RELEASE ORAL at 06:53

## 2021-12-29 RX ADMIN — Medication 1 SPRAY(S): at 06:53

## 2021-12-29 RX ADMIN — Medication 1 SPRAY(S): at 17:41

## 2021-12-29 RX ADMIN — Medication 125 MICROGRAM(S): at 06:53

## 2021-12-29 RX ADMIN — HEPARIN SODIUM 7500 UNIT(S): 5000 INJECTION INTRAVENOUS; SUBCUTANEOUS at 13:07

## 2021-12-29 NOTE — PROGRESS NOTE ADULT - PROBLEM SELECTOR PLAN 2
-pt reported choking with liquids 12/26  -cinesophogram ordered and completed  -no issues with choking today.  - recommendations include:  - regular diet with thin liquids  - upright position, small bites, chewing solids well, small single cup sip of thin liquids  - asp and reflux precautions  - good oral hygeine

## 2021-12-29 NOTE — PROGRESS NOTE ADULT - PROBLEM SELECTOR PLAN 10
#Hypothyroidism   - patient with thyroid cancer s/p resection in 2000 now with hypothyroidism   - follows with Dr. Ennis outpatient, currently on tirosint levothyroxine sodium) 125mcg daily   - TSH 10/2021 1.00,  TSH here WNL    PLAN  - c/w PO levothyroxine 125mcg daily.

## 2021-12-29 NOTE — PROGRESS NOTE ADULT - PROBLEM SELECTOR PLAN 5
Patient has Creon on med rec with meals and snacks, likely in the setting of pancreatic insufficiency due to prolonged diabetes.    PLAN  - c/w Creon 21388 units 6 capsules w/ breakfast, lunch, dinner  - c/w Creon 02375 units 3 capsule w/ snacks

## 2021-12-29 NOTE — PROVIDER CONTACT NOTE (HYPOGLYCEMIA EVENT) - NS PROVIDER CONTACT NOTE-TREATMENT-HYPO
4 oz Fruit Juice (Specify quantity, date/time)/Glucose gel 1 tube
4 oz Fruit Juice (Specify quantity, date/time)/Dextrose 50% 25 Grams IV Push
4 oz Fruit Juice (Specify quantity, date/time)/Dextrose 50% 25 Grams IV Push

## 2021-12-29 NOTE — PROGRESS NOTE ADULT - PROBLEM SELECTOR PLAN 9
#AZALEA   - patient with BUN 63, Cr 2.14 on admission (baseline creatinine 0.8),   - likely pre-renal in the setting of DKA   - will treat DKA and continue to trend BUN and creatinine  #Anion gap metabolic acidosis   - on admission pH 7.13, bicarb 8, AG 37   - likely multifactorial in origin with DKA, uremia and lactate contributing   - will continue to trend lactate and treat DKA and pre-renal AZALEA  - gap is 13 --> 10 now   #hypophosphatemia  - replete 12/22    PLAN  - Cr at 0.74 today, back to baseline  - gap decreased to 7, Cr still appropriate  - electrolytes wnl  - trend BMP and replete as required #AZALEA   - patient with BUN 63, Cr 2.14 on admission (baseline creatinine 0.8),   - likely pre-renal in the setting of DKA   - will treat DKA and continue to trend BUN and creatinine  #Anion gap metabolic acidosis   - on admission pH 7.13, bicarb 8, AG 37   - likely multifactorial in origin with DKA, uremia and lactate contributing   - will continue to trend lactate and treat DKA and pre-renal AZALEA  - gap is 13 --> 10 now   #hypophosphatemia  - replete 12/22    PLAN  - Cr at 0.78 today, back to baseline  - gap decreased to 10, Cr still appropriate  - electrolytes wnl  - trend BMP and replete as required

## 2021-12-29 NOTE — PROGRESS NOTE ADULT - PROBLEM SELECTOR PLAN 3
-pt complaining of 1 day hx mouth pain making it difficult for her to swallow  -ordered cepacol lozenges -pt complaining of 2 day hx mouth pain making it difficult for her to swallow, improved today  -pt. not tolerant of cepacol lozenges due to choking concerns.

## 2021-12-29 NOTE — PROVIDER CONTACT NOTE (HYPOGLYCEMIA EVENT) - NS PROVIDER CONTACT BACKGROUND-HYPO
Age: 71y    Gender: Female    POCT Blood Glucose:  59 mg/dL (12-29-21 @ 17:33)  142 mg/dL (12-29-21 @ 12:28)  197 mg/dL (12-29-21 @ 08:39)  253 mg/dL (12-28-21 @ 22:15)  201 mg/dL (12-28-21 @ 18:23)  44 mg/dL (12-28-21 @ 17:53)  46 mg/dL (12-28-21 @ 17:51)      eMAR:atorvastatin   40 milliGRAM(s) Oral (12-28-21 @ 22:27)    dextrose 50% Injectable   25 Gram(s) IV Push (12-28-21 @ 18:11)    insulin glargine Injectable (LANTUS)   30 Unit(s) SubCutaneous (12-29-21 @ 08:59)    insulin lispro (ADMELOG) corrective regimen sliding scale   1 Unit(s) SubCutaneous (12-29-21 @ 08:58)    insulin lispro (ADMELOG) corrective regimen sliding scale   1 Unit(s) SubCutaneous (12-28-21 @ 22:27)    insulin lispro Injectable (ADMELOG)   10 Unit(s) SubCutaneous (12-29-21 @ 08:58)    insulin lispro Injectable (ADMELOG)   2 Unit(s) SubCutaneous (12-29-21 @ 13:09)    levothyroxine   125 MICROGram(s) Oral (12-29-21 @ 06:53)    
Age: 71y    Gender: Female    POCT Blood Glucose:  201 mg/dL (12-28-21 @ 18:23)  44 mg/dL (12-28-21 @ 17:53)  46 mg/dL (12-28-21 @ 17:51)  198 mg/dL (12-28-21 @ 12:27)  269 mg/dL (12-28-21 @ 09:33)  162 mg/dL (12-28-21 @ 00:53)  77 mg/dL (12-27-21 @ 22:34)      eMAR:atorvastatin   40 milliGRAM(s) Oral (12-27-21 @ 23:03)    dextrose 50% Injectable   25 Gram(s) IV Push (12-28-21 @ 18:11)    insulin glargine Injectable (LANTUS)   30 Unit(s) SubCutaneous (12-28-21 @ 09:51)    insulin lispro (ADMELOG) corrective regimen sliding scale   1 Unit(s) SubCutaneous (12-28-21 @ 12:40)   3 Unit(s) SubCutaneous (12-28-21 @ 09:52)    insulin lispro Injectable (ADMELOG)   8 Unit(s) SubCutaneous (12-27-21 @ 18:42)    insulin lispro Injectable (ADMELOG)   10 Unit(s) SubCutaneous (12-28-21 @ 09:52)    insulin lispro Injectable (ADMELOG)   4 Unit(s) SubCutaneous (12-28-21 @ 12:39)    levothyroxine   125 MICROGram(s) Oral (12-28-21 @ 06:09)    
Age: 71y    Gender: Female    POCT Blood Glucose:  93 mg/dL (12-23-21 @ 18:39)  50 mg/dL (12-23-21 @ 18:19)  51 mg/dL (12-23-21 @ 18:00)  215 mg/dL (12-23-21 @ 12:31)  309 mg/dL (12-23-21 @ 09:45)  298 mg/dL (12-23-21 @ 07:28)  140 mg/dL (12-23-21 @ 02:44)  99 mg/dL (12-22-21 @ 23:03)      eMAR:  dextrose 40% Gel   15 Gram(s) Oral (12-23-21 @ 18:39)    insulin glargine Injectable (LANTUS)   18 Unit(s) SubCutaneous (12-23-21 @ 07:46)    insulin lispro (ADMELOG) corrective regimen sliding scale   4 Unit(s) SubCutaneous (12-23-21 @ 13:03)   8 Unit(s) SubCutaneous (12-23-21 @ 09:53)    insulin lispro Injectable (ADMELOG)   10 Unit(s) SubCutaneous (12-23-21 @ 13:03)    insulin lispro Injectable (ADMELOG)   7 Unit(s) SubCutaneous (12-23-21 @ 09:54)    levothyroxine Injectable   100 MICROGram(s) IV Push (12-22-21 @ 22:08)

## 2021-12-29 NOTE — PROGRESS NOTE ADULT - ASSESSMENT
71 year old woman with T1DM, uncontrolled, previously on insulin pump, admitted with DKA    1) T1DM, uncontrolled  DKA resolved  Pt with fasting BG above goal, increase Lantus 32 units  Pt with hypoglycemia after lunch yesterday, decrease pre-lunch admelog to 2units, continue with 10 units before breakfast and 8 units before dinner.  Continue admelog correction scale  Inpatient glucose goal 100 - 180 mg/dl      Plan to discharge on basal bolus insulin, if leaving today, then use doses above, switch to Toujeo for basal insulin and Apidra for prandial as she had been using them in the past  She was having difficulty using the insulin pump, particularly filling the reservoir with insertion site changes , would like to continue on insulin injections  She will follow up with Dr Sophia Terrell post discharge, and will change to carbohydrate based dosing for prandial insulin    2) Hyperlipidemia   - continue atorvastatin    3) Thyroid cancer, hypothyroidism  Continue levothyroxine 125 micrograms po daily    Discussed with patient   Contact via [employer] Teams  during business hours  On evenings and weekends, please call [phone] or page endocrine fellow on call.   Please note that this patient may be followed by different provider tomorrow. If no answer, contact endocrine fellow on call [phone] M-F 9a-5pm  [employer] password: [email or web address]      Time spent on encounter: 27  minutes spent on total encounter; The necessity of the time spent during the encounter on this date of service was due to development of plan of care/coordination of care/glycemic control through review of labs, blood glucose values and vital signs.

## 2021-12-29 NOTE — PROGRESS NOTE ADULT - PROBLEM SELECTOR PLAN 1
#Diabetes with DKA  - Patient with hx of DM1, non-complaint with basal bolus at home, A1C 9.4% on admission  - on admission glucose 1029, pH 7.13, bicarb 8, AG 37, BHB 9, K 6.0   - patient received insulin drip at 9units per hour while in MICU, adjusted per DKA protocol    PLAN  - ctm sugars with meals and bedtime - goal 140 - 180;   - endocrinology recommendations appreciated  - Lantus 30 sq for bedtime basal insulin  - Admelog 10 before breakfast, 4 before lunch, and 8 before dinner    -- these are discharge recs as well and will follow with endocrine outpatient  - LDISS for both meals and bedtime  - DISCHARGE: will not d/c on a pump.  pt is willing to be discharged on insulin pens.  She knows how to inject - dose TBD.  - Pt. will f/u with Dr. Sophia Terrell post-discharge #Diabetes with DKA  - Patient with hx of DM1, non-complaint with basal bolus at home, A1C 9.4% on admission  - on admission glucose 1029, pH 7.13, bicarb 8, AG 37, BHB 9, K 6.0   - patient received insulin drip at 9units per hour while in MICU, adjusted per DKA protocol    PLAN  - ctm sugars with meals and bedtime - goal 140 - 180;   - endocrinology recommendations appreciated  - Lantus 32 sq for bedtime basal insulin  - Admelog 10 before breakfast, 2 before lunch, and 8 before dinner    -- these are discharge recs as well and will follow with endocrine outpatient  - LDISS for both meals and bedtime  - DISCHARGE: will not d/c on a pump.  pt is willing to be discharged on insulin pens.  She knows how to inject - doses as above of trujeo/yao   - Pt. will f/u with Dr. Sophia Terrell post-discharge

## 2021-12-29 NOTE — PROGRESS NOTE ADULT - SUBJECTIVE AND OBJECTIVE BOX
Chris Gaxiola MD  Internal Medicine PGY-1  Pager NS: [phone] // [facility]: [id number]    PROGRESS NOTE:     Patient is a 71y old  Female who presents with a chief complaint of Hyperglycemia (28 Dec 2021 10:41)      SUBJECTIVE / OVERNIGHT EVENTS:    O/N patient's BG low at 44/46, got D50 25g once.    REVIEW OF SYSTEMS:    CONSTITUTIONAL: No weakness, fevers or chills  EYES/ENT: No visual changes;  No vertigo or throat pain   NECK: No pain or stiffness  RESPIRATORY: No cough, wheezing, hemoptysis; No shortness of breath  CARDIOVASCULAR: No chest pain or palpitations  GASTROINTESTINAL: No abdominal or epigastric pain. No nausea, vomiting, or hematemesis; No diarrhea or constipation. No melena or hematochezia.  GENITOURINARY: No dysuria, frequency or hematuria  NEUROLOGICAL: No numbness or weakness  SKIN: No itching, rashes      MEDICATIONS  (STANDING):  atorvastatin 40 milliGRAM(s) Oral at bedtime  carbidopa/levodopa  25/100 1 Tablet(s) Oral three times a day  chlorhexidine 2% Cloths 1 Application(s) Topical daily  dextrose 40% Gel 15 Gram(s) Oral once  dextrose 5%. 1000 milliLiter(s) (100 mL/Hr) IV Continuous <Continuous>  dextrose 5%. 1000 milliLiter(s) (50 mL/Hr) IV Continuous <Continuous>  dextrose 50% Injectable 25 Gram(s) IV Push once  dextrose 50% Injectable 12.5 Gram(s) IV Push once  dextrose 50% Injectable 25 Gram(s) IV Push once  escitalopram 10 milliGRAM(s) Oral daily  FIRST- Mouthwash  BLM 10 milliLiter(s) Swish and Spit every 12 hours  fluticasone propionate 50 MICROgram(s)/spray Nasal Spray 1 Spray(s) Both Nostrils two times a day  glucagon  Injectable 1 milliGRAM(s) IntraMuscular once  heparin   Injectable 7500 Unit(s) SubCutaneous every 8 hours  insulin glargine Injectable (LANTUS) 30 Unit(s) SubCutaneous <User Schedule>  insulin lispro (ADMELOG) corrective regimen sliding scale   SubCutaneous three times a day before meals  insulin lispro (ADMELOG) corrective regimen sliding scale   SubCutaneous at bedtime  insulin lispro Injectable (ADMELOG) 8 Unit(s) SubCutaneous before dinner  insulin lispro Injectable (ADMELOG) 10 Unit(s) SubCutaneous before breakfast  insulin lispro Injectable (ADMELOG) 2 Unit(s) SubCutaneous before lunch  levothyroxine 125 MICROGram(s) Oral daily  loratadine 10 milliGRAM(s) Oral daily  losartan 100 milliGRAM(s) Oral daily  pancrelipase  (CREON 12,000 Lipase Units) 6 Capsule(s) Oral three times a day with meals  pantoprazole    Tablet 40 milliGRAM(s) Oral before breakfast  rasagiline Tablet 1 milliGRAM(s) Oral daily    MEDICATIONS  (PRN):  acetaminophen     Tablet .. 650 milliGRAM(s) Oral every 6 hours PRN Temp greater or equal to 38C (100.4F), Mild Pain (1 - 3)  benzocaine 15 mG/menthol 3.6 mG (Sugar-Free) Lozenge 1 Lozenge Oral every 4 hours PRN Mouth Sores  haloperidol    Injectable 2 milliGRAM(s) IV Push every 6 hours PRN Agitation  sodium chloride 0.65% Nasal 1 Spray(s) Both Nostrils two times a day PRN Nasal Congestion      CAPILLARY BLOOD GLUCOSE      POCT Blood Glucose.: 253 mg/dL (28 Dec 2021 22:15)  POCT Blood Glucose.: 201 mg/dL (28 Dec 2021 18:23)  POCT Blood Glucose.: 44 mg/dL (28 Dec 2021 17:53)  POCT Blood Glucose.: 46 mg/dL (28 Dec 2021 17:51)  POCT Blood Glucose.: 198 mg/dL (28 Dec 2021 12:27)  POCT Blood Glucose.: 269 mg/dL (28 Dec 2021 09:33)    I&O's Summary    28 Dec 2021 07:01  -  29 Dec 2021 07:00  --------------------------------------------------------  IN: 0 mL / OUT: 500 mL / NET: -500 mL        VITALS:   T(C): 36.4 (12-29-21 @ 07:19), Max: 37.2 (12-28-21 @ 21:27)  HR: 76 (12-29-21 @ 07:19) (76 - 95)  BP: 145/60 (12-29-21 @ 07:19) (101/60 - 145/60)  RR: 18 (12-29-21 @ 07:19) (18 - 18)  SpO2: 100% (12-29-21 @ 07:19) (95% - 100%)    GENERAL: NAD, lying in bed comfortably  HEAD:  Atraumatic, normocephalic  EYES: EOMI, PERRLA, conjunctiva and sclera clear  ENT: Moist mucous membranes  NECK: Supple, no JVD  HEART: Regular rate and rhythm, no murmurs, rubs, or gallops  LUNGS: Unlabored respirations.  Clear to auscultation bilaterally, no crackles, wheezing, or rhonchi  ABDOMEN: Soft, nontender, nondistended, +BS  EXTREMITIES: 2+ peripheral pulses bilaterally. No clubbing, cyanosis, or edema  NERVOUS SYSTEM:  A&Ox3, no focal deficits   SKIN: No rashes or lesions    LABS:                        12.1   10.05 )-----------( 201      ( 29 Dec 2021 07:47 )             39.2     12-28    137  |  102  |  18  ----------------------------<  152<H>  4.3   |  28  |  0.74    Ca    9.0      28 Dec 2021 07:01  Phos  3.1     12-28  Mg     1.80     12-28    TPro  5.4<L>  /  Alb  2.8<L>  /  TBili  0.5  /  DBili  x   /  AST  71<H>  /  ALT  10  /  AlkPhos  152<H>  12-28                RADIOLOGY & ADDITIONAL TESTS:  Results Reviewed:   Imaging Personally Reviewed:  Electrocardiogram Personally Reviewed:    COORDINATION OF CARE:  Care Discussed with Consultants/Other Providers [Y/N]:  Prior or Outpatient Records Reviewed [Y/N]:   Chris Gaxiola MD  Internal Medicine PGY-1  Pager NS: [phone] // [facility]: [id number]    PROGRESS NOTE:     Patient is a 71y old  Female who presents with a chief complaint of Hyperglycemia (28 Dec 2021 10:41)      SUBJECTIVE / OVERNIGHT EVENTS:    O/N patient's BG low at 44/46, got D50 25g once. Pt. says she was not finishing 100% of her food which may have contributed to hypoglycemia. Pt. had no other complaints overnight, seen today lying in bed comfortably. Pt. expressed she was uncomfortable taking cepacol lozenges for her mouth pain but reports pain slightly improved today.    REVIEW OF SYSTEMS:    CONSTITUTIONAL: No weakness, fevers or chills  EYES/ENT: No visual changes;  No vertigo or throat pain   NECK: No pain or stiffness  RESPIRATORY: No cough, wheezing, hemoptysis; No shortness of breath  CARDIOVASCULAR: No chest pain or palpitations  GASTROINTESTINAL: No abdominal or epigastric pain. No nausea, vomiting, or hematemesis; No diarrhea or constipation. No melena or hematochezia.  GENITOURINARY: No dysuria, frequency or hematuria  NEUROLOGICAL: No numbness or weakness  SKIN: No itching, rashes      MEDICATIONS  (STANDING):  atorvastatin 40 milliGRAM(s) Oral at bedtime  carbidopa/levodopa  25/100 1 Tablet(s) Oral three times a day  chlorhexidine 2% Cloths 1 Application(s) Topical daily  dextrose 40% Gel 15 Gram(s) Oral once  dextrose 5%. 1000 milliLiter(s) (100 mL/Hr) IV Continuous <Continuous>  dextrose 5%. 1000 milliLiter(s) (50 mL/Hr) IV Continuous <Continuous>  dextrose 50% Injectable 25 Gram(s) IV Push once  dextrose 50% Injectable 12.5 Gram(s) IV Push once  dextrose 50% Injectable 25 Gram(s) IV Push once  escitalopram 10 milliGRAM(s) Oral daily  FIRST- Mouthwash  BLM 10 milliLiter(s) Swish and Spit every 12 hours  fluticasone propionate 50 MICROgram(s)/spray Nasal Spray 1 Spray(s) Both Nostrils two times a day  glucagon  Injectable 1 milliGRAM(s) IntraMuscular once  heparin   Injectable 7500 Unit(s) SubCutaneous every 8 hours  insulin glargine Injectable (LANTUS) 30 Unit(s) SubCutaneous <User Schedule>  insulin lispro (ADMELOG) corrective regimen sliding scale   SubCutaneous three times a day before meals  insulin lispro (ADMELOG) corrective regimen sliding scale   SubCutaneous at bedtime  insulin lispro Injectable (ADMELOG) 8 Unit(s) SubCutaneous before dinner  insulin lispro Injectable (ADMELOG) 10 Unit(s) SubCutaneous before breakfast  insulin lispro Injectable (ADMELOG) 2 Unit(s) SubCutaneous before lunch  levothyroxine 125 MICROGram(s) Oral daily  loratadine 10 milliGRAM(s) Oral daily  losartan 100 milliGRAM(s) Oral daily  pancrelipase  (CREON 12,000 Lipase Units) 6 Capsule(s) Oral three times a day with meals  pantoprazole    Tablet 40 milliGRAM(s) Oral before breakfast  rasagiline Tablet 1 milliGRAM(s) Oral daily    MEDICATIONS  (PRN):  acetaminophen     Tablet .. 650 milliGRAM(s) Oral every 6 hours PRN Temp greater or equal to 38C (100.4F), Mild Pain (1 - 3)  benzocaine 15 mG/menthol 3.6 mG (Sugar-Free) Lozenge 1 Lozenge Oral every 4 hours PRN Mouth Sores  haloperidol    Injectable 2 milliGRAM(s) IV Push every 6 hours PRN Agitation  sodium chloride 0.65% Nasal 1 Spray(s) Both Nostrils two times a day PRN Nasal Congestion      CAPILLARY BLOOD GLUCOSE      POCT Blood Glucose.: 253 mg/dL (28 Dec 2021 22:15)  POCT Blood Glucose.: 201 mg/dL (28 Dec 2021 18:23)  POCT Blood Glucose.: 44 mg/dL (28 Dec 2021 17:53)  POCT Blood Glucose.: 46 mg/dL (28 Dec 2021 17:51)  POCT Blood Glucose.: 198 mg/dL (28 Dec 2021 12:27)  POCT Blood Glucose.: 269 mg/dL (28 Dec 2021 09:33)    I&O's Summary    28 Dec 2021 07:01  -  29 Dec 2021 07:00  --------------------------------------------------------  IN: 0 mL / OUT: 500 mL / NET: -500 mL        VITALS:   T(C): 36.4 (12-29-21 @ 07:19), Max: 37.2 (12-28-21 @ 21:27)  HR: 76 (12-29-21 @ 07:19) (76 - 95)  BP: 145/60 (12-29-21 @ 07:19) (101/60 - 145/60)  RR: 18 (12-29-21 @ 07:19) (18 - 18)  SpO2: 100% (12-29-21 @ 07:19) (95% - 100%)    GENERAL: NAD, lying in bed comfortably  HEAD:  Atraumatic, normocephalic  EYES: EOMI, PERRLA, conjunctiva and sclera clear  ENT: Moist mucous membranes  NECK: Supple, no JVD  HEART: Regular rate and rhythm, no murmurs, rubs, or gallops  LUNGS: Unlabored respirations.  Clear to auscultation bilaterally, no crackles, wheezing, or rhonchi  ABDOMEN: Soft, nontender, nondistended, +BS  EXTREMITIES: 2+ peripheral pulses bilaterally. No clubbing, cyanosis, or edema  NERVOUS SYSTEM:  A&Ox3, no focal deficits   SKIN: No rashes or lesions    LABS:                        12.1   10.05 )-----------( 201      ( 29 Dec 2021 07:47 )             39.2     12-28    137  |  102  |  18  ----------------------------<  152<H>  4.3   |  28  |  0.74    Ca    9.0      28 Dec 2021 07:01  Phos  3.1     12-28  Mg     1.80     12-28    TPro  5.4<L>  /  Alb  2.8<L>  /  TBili  0.5  /  DBili  x   /  AST  71<H>  /  ALT  10  /  AlkPhos  152<H>  12-28                RADIOLOGY & ADDITIONAL TESTS:  Results Reviewed:   Imaging Personally Reviewed:  Electrocardiogram Personally Reviewed:    COORDINATION OF CARE:  Care Discussed with Consultants/Other Providers [Y/N]:  Prior or Outpatient Records Reviewed [Y/N]:

## 2021-12-30 LAB
GLUCOSE BLDC GLUCOMTR-MCNC: 181 MG/DL — HIGH (ref 70–99)
GLUCOSE BLDC GLUCOMTR-MCNC: 226 MG/DL — HIGH (ref 70–99)
GLUCOSE BLDC GLUCOMTR-MCNC: 237 MG/DL — HIGH (ref 70–99)
GLUCOSE BLDC GLUCOMTR-MCNC: 343 MG/DL — HIGH (ref 70–99)
GLUCOSE BLDC GLUCOMTR-MCNC: 86 MG/DL — SIGNIFICANT CHANGE UP (ref 70–99)

## 2021-12-30 PROCEDURE — 99232 SBSQ HOSP IP/OBS MODERATE 35: CPT

## 2021-12-30 PROCEDURE — 99232 SBSQ HOSP IP/OBS MODERATE 35: CPT | Mod: GC

## 2021-12-30 RX ORDER — INSULIN GLARGINE 100 [IU]/ML
32 INJECTION, SOLUTION SUBCUTANEOUS
Qty: 5 | Refills: 0
Start: 2021-12-30 | End: 2022-01-28

## 2021-12-30 RX ORDER — INSULIN LISPRO 100/ML
50 VIAL (ML) SUBCUTANEOUS
Qty: 0 | Refills: 0 | DISCHARGE

## 2021-12-30 RX ORDER — INSULIN GLULISINE 100 [IU]/ML
10 INJECTION, SOLUTION SUBCUTANEOUS
Qty: 5 | Refills: 0
Start: 2021-12-30 | End: 2022-01-28

## 2021-12-30 RX ORDER — INSULIN LISPRO 100/ML
4 VIAL (ML) SUBCUTANEOUS
Refills: 0 | Status: DISCONTINUED | OUTPATIENT
Start: 2021-12-30 | End: 2021-12-31

## 2021-12-30 RX ORDER — LIPASE/PROTEASE/AMYLASE 16-48-48K
2 CAPSULE,DELAYED RELEASE (ENTERIC COATED) ORAL
Qty: 0 | Refills: 0 | DISCHARGE

## 2021-12-30 RX ADMIN — CARBIDOPA AND LEVODOPA 1 TABLET(S): 25; 100 TABLET ORAL at 06:33

## 2021-12-30 RX ADMIN — LORATADINE 10 MILLIGRAM(S): 10 TABLET ORAL at 12:30

## 2021-12-30 RX ADMIN — Medication 1 SPRAY(S): at 18:13

## 2021-12-30 RX ADMIN — Medication 6 CAPSULE(S): at 13:45

## 2021-12-30 RX ADMIN — DIPHENHYDRAMINE HYDROCHLORIDE AND LIDOCAINE HYDROCHLORIDE AND ALUMINUM HYDROXIDE AND MAGNESIUM HYDRO 10 MILLILITER(S): KIT at 18:10

## 2021-12-30 RX ADMIN — PANTOPRAZOLE SODIUM 40 MILLIGRAM(S): 20 TABLET, DELAYED RELEASE ORAL at 06:32

## 2021-12-30 RX ADMIN — CARBIDOPA AND LEVODOPA 1 TABLET(S): 25; 100 TABLET ORAL at 13:44

## 2021-12-30 RX ADMIN — Medication 1 SPRAY(S): at 06:32

## 2021-12-30 RX ADMIN — Medication 4: at 09:19

## 2021-12-30 RX ADMIN — Medication 0.5 MILLIGRAM(S): at 22:19

## 2021-12-30 RX ADMIN — Medication 2: at 18:16

## 2021-12-30 RX ADMIN — Medication 10 UNIT(S): at 09:19

## 2021-12-30 RX ADMIN — ATORVASTATIN CALCIUM 40 MILLIGRAM(S): 80 TABLET, FILM COATED ORAL at 22:19

## 2021-12-30 RX ADMIN — LOSARTAN POTASSIUM 100 MILLIGRAM(S): 100 TABLET, FILM COATED ORAL at 06:32

## 2021-12-30 RX ADMIN — Medication 6 CAPSULE(S): at 18:15

## 2021-12-30 RX ADMIN — RASAGILINE 1 MILLIGRAM(S): 0.5 TABLET ORAL at 12:30

## 2021-12-30 RX ADMIN — Medication 125 MICROGRAM(S): at 06:32

## 2021-12-30 RX ADMIN — HEPARIN SODIUM 7500 UNIT(S): 5000 INJECTION INTRAVENOUS; SUBCUTANEOUS at 13:44

## 2021-12-30 RX ADMIN — CARBIDOPA AND LEVODOPA 1 TABLET(S): 25; 100 TABLET ORAL at 22:19

## 2021-12-30 RX ADMIN — HEPARIN SODIUM 7500 UNIT(S): 5000 INJECTION INTRAVENOUS; SUBCUTANEOUS at 22:18

## 2021-12-30 RX ADMIN — Medication 4 UNIT(S): at 18:15

## 2021-12-30 RX ADMIN — CHLORHEXIDINE GLUCONATE 1 APPLICATION(S): 213 SOLUTION TOPICAL at 12:32

## 2021-12-30 RX ADMIN — Medication 1: at 13:46

## 2021-12-30 RX ADMIN — Medication 6 CAPSULE(S): at 09:21

## 2021-12-30 RX ADMIN — HEPARIN SODIUM 7500 UNIT(S): 5000 INJECTION INTRAVENOUS; SUBCUTANEOUS at 06:33

## 2021-12-30 RX ADMIN — INSULIN GLARGINE 32 UNIT(S): 100 INJECTION, SOLUTION SUBCUTANEOUS at 09:20

## 2021-12-30 RX ADMIN — ESCITALOPRAM OXALATE 10 MILLIGRAM(S): 10 TABLET, FILM COATED ORAL at 12:30

## 2021-12-30 RX ADMIN — DIPHENHYDRAMINE HYDROCHLORIDE AND LIDOCAINE HYDROCHLORIDE AND ALUMINUM HYDROXIDE AND MAGNESIUM HYDRO 10 MILLILITER(S): KIT at 06:32

## 2021-12-30 NOTE — PROGRESS NOTE ADULT - SUBJECTIVE AND OBJECTIVE BOX
Diabetes  Follow up note    Interval History:Pt with hypoglycemia yesterday after lunch yesterday, did not receive insulin before dinner (due to hypoglycemia) and  then had late night snack (turkey sandwhich at ~ midnight).  Reports good appetite, no n/v    MEDICATIONS  (STANDING):  atorvastatin 40 milliGRAM(s) Oral at bedtime  carbidopa/levodopa  25/100 1 Tablet(s) Oral three times a day  chlorhexidine 2% Cloths 1 Application(s) Topical daily  dextrose 40% Gel 15 Gram(s) Oral once  dextrose 5%. 1000 milliLiter(s) (50 mL/Hr) IV Continuous <Continuous>  dextrose 5%. 1000 milliLiter(s) (100 mL/Hr) IV Continuous <Continuous>  dextrose 50% Injectable 12.5 Gram(s) IV Push once  dextrose 50% Injectable 25 Gram(s) IV Push once  dextrose 50% Injectable 25 Gram(s) IV Push once  escitalopram 10 milliGRAM(s) Oral daily  FIRST- Mouthwash  BLM 10 milliLiter(s) Swish and Spit every 12 hours  fluticasone propionate 50 MICROgram(s)/spray Nasal Spray 1 Spray(s) Both Nostrils two times a day  glucagon  Injectable 1 milliGRAM(s) IntraMuscular once  heparin   Injectable 7500 Unit(s) SubCutaneous every 8 hours  insulin glargine Injectable (LANTUS) 32 Unit(s) SubCutaneous <User Schedule>  insulin lispro (ADMELOG) corrective regimen sliding scale   SubCutaneous three times a day before meals  insulin lispro (ADMELOG) corrective regimen sliding scale   SubCutaneous at bedtime  insulin lispro Injectable (ADMELOG) 8 Unit(s) SubCutaneous before dinner  insulin lispro Injectable (ADMELOG) 10 Unit(s) SubCutaneous before breakfast  levothyroxine 125 MICROGram(s) Oral daily  loratadine 10 milliGRAM(s) Oral daily  LORazepam     Tablet 0.5 milliGRAM(s) Oral at bedtime  losartan 100 milliGRAM(s) Oral daily  pancrelipase  (CREON 12,000 Lipase Units) 6 Capsule(s) Oral three times a day with meals  pantoprazole    Tablet 40 milliGRAM(s) Oral before breakfast  rasagiline Tablet 1 milliGRAM(s) Oral daily    MEDICATIONS  (PRN):  acetaminophen     Tablet .. 650 milliGRAM(s) Oral every 6 hours PRN Temp greater or equal to 38C (100.4F), Mild Pain (1 - 3)  benzocaine 15 mG/menthol 3.6 mG (Sugar-Free) Lozenge 1 Lozenge Oral every 4 hours PRN Mouth Sores  sodium chloride 0.65% Nasal 1 Spray(s) Both Nostrils two times a day PRN Nasal Congestion      Allergies    Ceclor (Unknown)  iodine containing compounds (Unknown)  shellfish (Unknown)    Intolerances          PHYSICAL EXAM:  VITALS: T(C): 37.1 (12-30-21 @ 06:15)  T(F): 98.7 (12-30-21 @ 06:15), Max: 99 (12-29-21 @ 13:00)  HR: 75 (12-30-21 @ 06:15) (75 - 77)  BP: 150/79 (12-30-21 @ 06:15) (126/57 - 150/79)  RR:  (18 - 18)  SpO2:  (96% - 100%)  GENERAL: Lying in bed in NAD,   EYES: No proptosis,  HEENT:  Atraumatic, Normocephalic,   RESPIRATORY: Clear to auscultation bilaterally  CARDIOVASCULAR: Regular rhythm; No murmurs; no peripheral edema  GI: Soft, nontender, non distended, normal bowel sounds    POCT Blood Glucose.: 343 mg/dL (12-30-21 @ 09:13)  POCT Blood Glucose.: 197 mg/dL (12-29-21 @ 22:06)  POCT Blood Glucose.: 160 mg/dL (12-29-21 @ 17:56)  POCT Blood Glucose.: 59 mg/dL (12-29-21 @ 17:33)  POCT Blood Glucose.: 46 mg/dL (12-29-21 @ 17:31)  POCT Blood Glucose.: 142 mg/dL (12-29-21 @ 12:28)  POCT Blood Glucose.: 197 mg/dL (12-29-21 @ 08:39)  POCT Blood Glucose.: 253 mg/dL (12-28-21 @ 22:15)  POCT Blood Glucose.: 201 mg/dL (12-28-21 @ 18:23)  POCT Blood Glucose.: 44 mg/dL (12-28-21 @ 17:53)  POCT Blood Glucose.: 46 mg/dL (12-28-21 @ 17:51)  POCT Blood Glucose.: 198 mg/dL (12-28-21 @ 12:27)  POCT Blood Glucose.: 269 mg/dL (12-28-21 @ 09:33)  POCT Blood Glucose.: 162 mg/dL (12-28-21 @ 00:53)  POCT Blood Glucose.: 77 mg/dL (12-27-21 @ 22:34)  POCT Blood Glucose.: 87 mg/dL (12-27-21 @ 17:59)        12-29    137  |  101  |  17  ----------------------------<  163<H>  4.0   |  26  |  0.78    EGFR if : 87  EGFR if non : 75    Ca    9.0      12-29  Mg     1.80     12-29  Phos  2.9     12-29    TPro  5.6<L>  /  Alb  2.9<L>  /  TBili  0.5  /  DBili  x   /  AST  67<H>  /  ALT  17  /  AlkPhos  178<H>  12-29        A1C with Estimated Average Glucose Result: 9.4 % (12-20-21 @ 19:29)

## 2021-12-30 NOTE — PROGRESS NOTE ADULT - PROBLEM SELECTOR PLAN 5
Patient has Creon on med rec with meals and snacks, likely in the setting of pancreatic insufficiency due to prolonged diabetes.    PLAN  - c/w Creon 33105 units 6 capsules w/ breakfast, lunch, dinner  - c/w Creon 38083 units 3 capsule w/ snacks

## 2021-12-30 NOTE — PROGRESS NOTE ADULT - ATTENDING COMMENTS
post lunch hypoglycemia, so pre lunch insulin dcd  basal inc to 32 units  jb Endo  remains medically cleared for DC P SANDRA  time spent 35 min  dw SW and CM

## 2021-12-30 NOTE — PROGRESS NOTE ADULT - SUBJECTIVE AND OBJECTIVE BOX
Chris Gaxiola MD  Internal Medicine PGY-1  Pager NS: [phone] // [facility]: [phone]    PROGRESS NOTE:     Patient is a 71y old  Female who presents with a chief complaint of Hyperglycemia (29 Dec 2021 13:28)      SUBJECTIVE / OVERNIGHT EVENTS:    Pt. found to be hypoglycemic yesterday PM, endocrine aware and decreased lunchtime admelog. Received d50 25g once. No acute events overnight. Patient examined at bedside with no acute complaints.     REVIEW OF SYSTEMS:    CONSTITUTIONAL: No weakness, fevers or chills  EYES/ENT: No visual changes;  No vertigo or throat pain   NECK: No pain or stiffness  RESPIRATORY: No cough, wheezing, hemoptysis; No shortness of breath  CARDIOVASCULAR: No chest pain or palpitations  GASTROINTESTINAL: No abdominal or epigastric pain. No nausea, vomiting, or hematemesis; No diarrhea or constipation. No melena or hematochezia.  GENITOURINARY: No dysuria, frequency or hematuria  NEUROLOGICAL: No numbness or weakness  SKIN: No itching, rashes      MEDICATIONS  (STANDING):  atorvastatin 40 milliGRAM(s) Oral at bedtime  carbidopa/levodopa  25/100 1 Tablet(s) Oral three times a day  chlorhexidine 2% Cloths 1 Application(s) Topical daily  dextrose 40% Gel 15 Gram(s) Oral once  dextrose 5%. 1000 milliLiter(s) (50 mL/Hr) IV Continuous <Continuous>  dextrose 5%. 1000 milliLiter(s) (100 mL/Hr) IV Continuous <Continuous>  dextrose 50% Injectable 25 Gram(s) IV Push once  dextrose 50% Injectable 12.5 Gram(s) IV Push once  dextrose 50% Injectable 25 Gram(s) IV Push once  escitalopram 10 milliGRAM(s) Oral daily  FIRST- Mouthwash  BLM 10 milliLiter(s) Swish and Spit every 12 hours  fluticasone propionate 50 MICROgram(s)/spray Nasal Spray 1 Spray(s) Both Nostrils two times a day  glucagon  Injectable 1 milliGRAM(s) IntraMuscular once  heparin   Injectable 7500 Unit(s) SubCutaneous every 8 hours  insulin glargine Injectable (LANTUS) 32 Unit(s) SubCutaneous <User Schedule>  insulin lispro (ADMELOG) corrective regimen sliding scale   SubCutaneous three times a day before meals  insulin lispro (ADMELOG) corrective regimen sliding scale   SubCutaneous at bedtime  insulin lispro Injectable (ADMELOG) 8 Unit(s) SubCutaneous before dinner  insulin lispro Injectable (ADMELOG) 10 Unit(s) SubCutaneous before breakfast  levothyroxine 125 MICROGram(s) Oral daily  loratadine 10 milliGRAM(s) Oral daily  LORazepam     Tablet 0.5 milliGRAM(s) Oral at bedtime  losartan 100 milliGRAM(s) Oral daily  pancrelipase  (CREON 12,000 Lipase Units) 6 Capsule(s) Oral three times a day with meals  pantoprazole    Tablet 40 milliGRAM(s) Oral before breakfast  rasagiline Tablet 1 milliGRAM(s) Oral daily    MEDICATIONS  (PRN):  acetaminophen     Tablet .. 650 milliGRAM(s) Oral every 6 hours PRN Temp greater or equal to 38C (100.4F), Mild Pain (1 - 3)  benzocaine 15 mG/menthol 3.6 mG (Sugar-Free) Lozenge 1 Lozenge Oral every 4 hours PRN Mouth Sores  sodium chloride 0.65% Nasal 1 Spray(s) Both Nostrils two times a day PRN Nasal Congestion      CAPILLARY BLOOD GLUCOSE      POCT Blood Glucose.: 197 mg/dL (29 Dec 2021 22:06)  POCT Blood Glucose.: 160 mg/dL (29 Dec 2021 17:56)  POCT Blood Glucose.: 59 mg/dL (29 Dec 2021 17:33)  POCT Blood Glucose.: 46 mg/dL (29 Dec 2021 17:31)  POCT Blood Glucose.: 142 mg/dL (29 Dec 2021 12:28)  POCT Blood Glucose.: 197 mg/dL (29 Dec 2021 08:39)    I&O's Summary    29 Dec 2021 07:01  -  30 Dec 2021 07:00  --------------------------------------------------------  IN: 300 mL / OUT: 1650 mL / NET: -1350 mL        VITALS:   T(C): 37.1 (12-30-21 @ 06:15), Max: 37.2 (12-29-21 @ 13:00)  HR: 75 (12-30-21 @ 06:15) (75 - 77)  BP: 150/79 (12-30-21 @ 06:15) (126/57 - 150/79)  RR: 18 (12-30-21 @ 06:15) (18 - 18)  SpO2: 100% (12-30-21 @ 06:15) (96% - 100%)    GENERAL: NAD, lying in bed comfortably  HEAD:  Atraumatic, normocephalic  EYES: EOMI, PERRLA, conjunctiva and sclera clear  ENT: Moist mucous membranes  NECK: Supple, no JVD  HEART: Regular rate and rhythm, no murmurs, rubs, or gallops  LUNGS: Unlabored respirations.  Clear to auscultation bilaterally, no crackles, wheezing, or rhonchi  ABDOMEN: Soft, nontender, nondistended, +BS  EXTREMITIES: 2+ peripheral pulses bilaterally. No clubbing, cyanosis, or edema  NERVOUS SYSTEM:  A&Ox3, no focal deficits   SKIN: No rashes or lesions    LABS:                        12.1   10.05 )-----------( 201      ( 29 Dec 2021 07:47 )             39.2     12-29    137  |  101  |  17  ----------------------------<  163<H>  4.0   |  26  |  0.78    Ca    9.0      29 Dec 2021 07:47  Phos  2.9     12-29  Mg     1.80     12-29    TPro  5.6<L>  /  Alb  2.9<L>  /  TBili  0.5  /  DBili  x   /  AST  67<H>  /  ALT  17  /  AlkPhos  178<H>  12-29                RADIOLOGY & ADDITIONAL TESTS:  Results Reviewed:   Imaging Personally Reviewed:  Electrocardiogram Personally Reviewed:    COORDINATION OF CARE:  Care Discussed with Consultants/Other Providers [Y/N]:  Prior or Outpatient Records Reviewed [Y/N]:   Chris Gaxiola MD  Internal Medicine PGY-1  Pager NS: [phone] // [facility]: [phone]    PROGRESS NOTE:     Patient is a 71y old  Female who presents with a chief complaint of Hyperglycemia (29 Dec 2021 13:28)      SUBJECTIVE / OVERNIGHT EVENTS:    Pt. found to be hypoglycemic yesterday PM, endocrine aware and decreased lunchtime admelog. Received d50 25g once. Pt. reports she did not eat part of her lunch b/c she was concerned about carbohydrate content. No acute events overnight. Patient examined at bedside with no acute complaints, reports she is ready to go to rehab.     REVIEW OF SYSTEMS:    CONSTITUTIONAL: No weakness, fevers or chills  EYES/ENT: No visual changes;  No vertigo or throat pain, reports mouth pain still present but improved.   NECK: No pain or stiffness  RESPIRATORY: No cough, wheezing, hemoptysis; No shortness of breath  CARDIOVASCULAR: No chest pain or palpitations  GASTROINTESTINAL: No abdominal or epigastric pain. No nausea, vomiting, or hematemesis; No diarrhea or constipation. No melena or hematochezia.  GENITOURINARY: No dysuria, frequency or hematuria  NEUROLOGICAL: No numbness or weakness  SKIN: No itching, rashes      MEDICATIONS  (STANDING):  atorvastatin 40 milliGRAM(s) Oral at bedtime  carbidopa/levodopa  25/100 1 Tablet(s) Oral three times a day  chlorhexidine 2% Cloths 1 Application(s) Topical daily  dextrose 40% Gel 15 Gram(s) Oral once  dextrose 5%. 1000 milliLiter(s) (50 mL/Hr) IV Continuous <Continuous>  dextrose 5%. 1000 milliLiter(s) (100 mL/Hr) IV Continuous <Continuous>  dextrose 50% Injectable 25 Gram(s) IV Push once  dextrose 50% Injectable 12.5 Gram(s) IV Push once  dextrose 50% Injectable 25 Gram(s) IV Push once  escitalopram 10 milliGRAM(s) Oral daily  FIRST- Mouthwash  BLM 10 milliLiter(s) Swish and Spit every 12 hours  fluticasone propionate 50 MICROgram(s)/spray Nasal Spray 1 Spray(s) Both Nostrils two times a day  glucagon  Injectable 1 milliGRAM(s) IntraMuscular once  heparin   Injectable 7500 Unit(s) SubCutaneous every 8 hours  insulin glargine Injectable (LANTUS) 32 Unit(s) SubCutaneous <User Schedule>  insulin lispro (ADMELOG) corrective regimen sliding scale   SubCutaneous three times a day before meals  insulin lispro (ADMELOG) corrective regimen sliding scale   SubCutaneous at bedtime  insulin lispro Injectable (ADMELOG) 8 Unit(s) SubCutaneous before dinner  insulin lispro Injectable (ADMELOG) 10 Unit(s) SubCutaneous before breakfast  levothyroxine 125 MICROGram(s) Oral daily  loratadine 10 milliGRAM(s) Oral daily  LORazepam     Tablet 0.5 milliGRAM(s) Oral at bedtime  losartan 100 milliGRAM(s) Oral daily  pancrelipase  (CREON 12,000 Lipase Units) 6 Capsule(s) Oral three times a day with meals  pantoprazole    Tablet 40 milliGRAM(s) Oral before breakfast  rasagiline Tablet 1 milliGRAM(s) Oral daily    MEDICATIONS  (PRN):  acetaminophen     Tablet .. 650 milliGRAM(s) Oral every 6 hours PRN Temp greater or equal to 38C (100.4F), Mild Pain (1 - 3)  benzocaine 15 mG/menthol 3.6 mG (Sugar-Free) Lozenge 1 Lozenge Oral every 4 hours PRN Mouth Sores  sodium chloride 0.65% Nasal 1 Spray(s) Both Nostrils two times a day PRN Nasal Congestion      CAPILLARY BLOOD GLUCOSE      POCT Blood Glucose.: 197 mg/dL (29 Dec 2021 22:06)  POCT Blood Glucose.: 160 mg/dL (29 Dec 2021 17:56)  POCT Blood Glucose.: 59 mg/dL (29 Dec 2021 17:33)  POCT Blood Glucose.: 46 mg/dL (29 Dec 2021 17:31)  POCT Blood Glucose.: 142 mg/dL (29 Dec 2021 12:28)  POCT Blood Glucose.: 197 mg/dL (29 Dec 2021 08:39)    I&O's Summary    29 Dec 2021 07:01  -  30 Dec 2021 07:00  --------------------------------------------------------  IN: 300 mL / OUT: 1650 mL / NET: -1350 mL        VITALS:   T(C): 37.1 (12-30-21 @ 06:15), Max: 37.2 (12-29-21 @ 13:00)  HR: 75 (12-30-21 @ 06:15) (75 - 77)  BP: 150/79 (12-30-21 @ 06:15) (126/57 - 150/79)  RR: 18 (12-30-21 @ 06:15) (18 - 18)  SpO2: 100% (12-30-21 @ 06:15) (96% - 100%)    GENERAL: NAD, lying in bed comfortably  HEAD:  Atraumatic, normocephalic  EYES: EOMI, PERRLA, conjunctiva and sclera clear  ENT: Moist mucous membranes  NECK: Supple, no JVD  HEART: Regular rate and rhythm, no murmurs, rubs, or gallops  LUNGS: Unlabored respirations.  Clear to auscultation bilaterally, no crackles, wheezing, or rhonchi  ABDOMEN: Soft, nontender, nondistended, +BS  EXTREMITIES: 2+ peripheral pulses bilaterally. No clubbing, cyanosis, or edema  NERVOUS SYSTEM:  A&Ox3, no focal deficits   SKIN: No rashes or lesions    LABS:                        12.1   10.05 )-----------( 201      ( 29 Dec 2021 07:47 )             39.2     12-29    137  |  101  |  17  ----------------------------<  163<H>  4.0   |  26  |  0.78    Ca    9.0      29 Dec 2021 07:47  Phos  2.9     12-29  Mg     1.80     12-29    TPro  5.6<L>  /  Alb  2.9<L>  /  TBili  0.5  /  DBili  x   /  AST  67<H>  /  ALT  17  /  AlkPhos  178<H>  12-29                RADIOLOGY & ADDITIONAL TESTS:  Results Reviewed:   Imaging Personally Reviewed:  Electrocardiogram Personally Reviewed:    COORDINATION OF CARE:  Care Discussed with Consultants/Other Providers [Y/N]:  Prior or Outpatient Records Reviewed [Y/N]:

## 2021-12-30 NOTE — PROGRESS NOTE ADULT - PROBLEM SELECTOR PLAN 3
-pt complaining of 2 day hx mouth pain making it difficult for her to swallow, improved today  -pt. not tolerant of cepacol lozenges due to choking concerns.

## 2021-12-30 NOTE — PROGRESS NOTE ADULT - PROBLEM SELECTOR PLAN 9
#AZALEA   - patient with BUN 63, Cr 2.14 on admission (baseline creatinine 0.8),   - likely pre-renal in the setting of DKA   - will treat DKA and continue to trend BUN and creatinine  #Anion gap metabolic acidosis   - on admission pH 7.13, bicarb 8, AG 37   - likely multifactorial in origin with DKA, uremia and lactate contributing   - will continue to trend lactate and treat DKA and pre-renal AZALEA  - gap is 13 --> 10 now   #hypophosphatemia  - replete 12/22    PLAN  - Cr at 0.78 today, back to baseline  - gap decreased to 10, Cr still appropriate  - electrolytes wnl  - trend BMP and replete as required

## 2021-12-30 NOTE — PROGRESS NOTE ADULT - PROBLEM SELECTOR PLAN 1
#Diabetes with DKA  - Patient with hx of DM1, non-complaint with basal bolus at home, A1C 9.4% on admission  - on admission glucose 1029, pH 7.13, bicarb 8, AG 37, BHB 9, K 6.0   - patient received insulin drip at 9units per hour while in MICU, adjusted per DKA protocol    PLAN  - ctm sugars with meals and bedtime - goal 140 - 180;   - endocrinology recommendations appreciated  - Lantus 32 sq for bedtime basal insulin  - Admelog 10 before breakfast, 2 before lunch, and 8 before dinner    -- these are discharge recs as well and will follow with endocrine outpatient  - LDISS for both meals and bedtime  - DISCHARGE: will not d/c on a pump.  pt is willing to be discharged on insulin pens.  She knows how to inject - doses as above of trujeo/yao   - Pt. will f/u with Dr. Sophia Terrell post-discharge #Diabetes with DKA  - Patient with hx of DM1, non-complaint with basal bolus at home, A1C 9.4% on admission  - on admission glucose 1029, pH 7.13, bicarb 8, AG 37, BHB 9, K 6.0   - patient received insulin drip at 9units per hour while in MICU, adjusted per DKA protocol    PLAN  - ctm sugars with meals and bedtime - goal 140 - 180;   - endocrinology recommendations appreciated  - Lantus 32 sq for bedtime basal insulin  - Admelog 10 before breakfast and 8 before dinner    -- these are discharge recs as well and will follow with endocrine outpatient  - LDISS for both meals and bedtime  - DISCHARGE: will not d/c on a pump.  pt is willing to be discharged on insulin pens.  She knows how to inject - doses as above of trujeo/yao   - Pt. will f/u with Dr. Sophia Terrell post-discharge

## 2021-12-30 NOTE — PROGRESS NOTE ADULT - PROBLEM SELECTOR PLAN 2
-pt reported choking with liquids 12/26  -cinesophogram ordered and completed  -no issues with choking today.  - recommendations include:  - regular diet with thin liquids  - upright position, small bites, chewing solids well, small single cup sip of thin liquids  - asp and reflux precautions  - good oral hygeine -pt reported choking with liquids 12/26  -cinesophogram ordered and completed  -no issues with choking today.  - recommendations include:  - regular diet with thin liquids  - upright position, small bites, chewing solids well, small single cup sip of thin liquids  - asp and reflux precautions  - good oral hygiene

## 2021-12-31 ENCOUNTER — TRANSCRIPTION ENCOUNTER (OUTPATIENT)
Age: 71
End: 2021-12-31

## 2021-12-31 VITALS
HEART RATE: 92 BPM | RESPIRATION RATE: 20 BRPM | DIASTOLIC BLOOD PRESSURE: 70 MMHG | SYSTOLIC BLOOD PRESSURE: 145 MMHG | OXYGEN SATURATION: 98 % | TEMPERATURE: 98 F

## 2021-12-31 LAB
GLUCOSE BLDC GLUCOMTR-MCNC: 233 MG/DL — HIGH (ref 70–99)
GLUCOSE BLDC GLUCOMTR-MCNC: 253 MG/DL — HIGH (ref 70–99)

## 2021-12-31 PROCEDURE — 99239 HOSP IP/OBS DSCHRG MGMT >30: CPT

## 2021-12-31 RX ADMIN — CARBIDOPA AND LEVODOPA 1 TABLET(S): 25; 100 TABLET ORAL at 12:48

## 2021-12-31 RX ADMIN — Medication 1 SPRAY(S): at 06:23

## 2021-12-31 RX ADMIN — Medication 2: at 12:43

## 2021-12-31 RX ADMIN — ESCITALOPRAM OXALATE 10 MILLIGRAM(S): 10 TABLET, FILM COATED ORAL at 12:49

## 2021-12-31 RX ADMIN — CHLORHEXIDINE GLUCONATE 1 APPLICATION(S): 213 SOLUTION TOPICAL at 12:43

## 2021-12-31 RX ADMIN — CARBIDOPA AND LEVODOPA 1 TABLET(S): 25; 100 TABLET ORAL at 06:22

## 2021-12-31 RX ADMIN — INSULIN GLARGINE 32 UNIT(S): 100 INJECTION, SOLUTION SUBCUTANEOUS at 09:31

## 2021-12-31 RX ADMIN — PANTOPRAZOLE SODIUM 40 MILLIGRAM(S): 20 TABLET, DELAYED RELEASE ORAL at 06:22

## 2021-12-31 RX ADMIN — LOSARTAN POTASSIUM 100 MILLIGRAM(S): 100 TABLET, FILM COATED ORAL at 06:23

## 2021-12-31 RX ADMIN — DIPHENHYDRAMINE HYDROCHLORIDE AND LIDOCAINE HYDROCHLORIDE AND ALUMINUM HYDROXIDE AND MAGNESIUM HYDRO 10 MILLILITER(S): KIT at 06:23

## 2021-12-31 RX ADMIN — RASAGILINE 1 MILLIGRAM(S): 0.5 TABLET ORAL at 12:49

## 2021-12-31 RX ADMIN — LORATADINE 10 MILLIGRAM(S): 10 TABLET ORAL at 12:49

## 2021-12-31 RX ADMIN — Medication 10 UNIT(S): at 09:24

## 2021-12-31 RX ADMIN — HEPARIN SODIUM 7500 UNIT(S): 5000 INJECTION INTRAVENOUS; SUBCUTANEOUS at 06:23

## 2021-12-31 RX ADMIN — Medication 6 CAPSULE(S): at 12:48

## 2021-12-31 RX ADMIN — HEPARIN SODIUM 7500 UNIT(S): 5000 INJECTION INTRAVENOUS; SUBCUTANEOUS at 13:40

## 2021-12-31 RX ADMIN — Medication 125 MICROGRAM(S): at 06:22

## 2021-12-31 RX ADMIN — Medication 3: at 09:23

## 2021-12-31 NOTE — PROGRESS NOTE ADULT - PROVIDER SPECIALTY LIST ADULT
MICU
Endocrinology
Internal Medicine
Endocrinology
Internal Medicine
MICU
Endocrinology
Internal Medicine
Internal Medicine
Endocrinology
Internal Medicine

## 2021-12-31 NOTE — PROGRESS NOTE ADULT - ATTENDING COMMENTS
71F hx of T1DM (since age 38), Parkinsons (diagnosed 2017), bladder incontinence, anxiety/depression, HTN, HLD, thyroid cancer s/p resection in 2000 now with hypothyroidism presents to the ED d/t SOB, nausea, vomiting and AMS for one day, found to have DKA and resolved after MICU stay. Endo following. Sugars are now better controlled. PT recommending rehab. Medically ready for discharge to rehab today.     Plan discussed with HS. Remainder of plan as stated above.     >30 minutes of time spent coordinating care and discharge management.

## 2021-12-31 NOTE — PROGRESS NOTE ADULT - PROBLEM SELECTOR PLAN 1
#Diabetes with DKA  - Patient with hx of DM1, non-complaint with basal bolus at home, A1C 9.4% on admission  - on admission glucose 1029, pH 7.13, bicarb 8, AG 37, BHB 9, K 6.0   - patient received insulin drip at 9units per hour while in MICU, adjusted per DKA protocol    PLAN  - ctm sugars with meals and bedtime - goal 140 - 180;   - endocrinology recommendations appreciated  - Lantus 32 sq for bedtime basal insulin  - Admelog 10 before breakfast and 8 before dinner    -- these are discharge recs as well and will follow with endocrine outpatient  - LDISS for both meals and bedtime  - DISCHARGE: will not d/c on a pump.  pt is willing to be discharged on insulin pens.  She knows how to inject - doses as above of trujeo/yao   - Pt. will f/u with Dr. Sophia Terrell post-discharge #Diabetes with DKA  - Patient with hx of DM1, non-complaint with basal bolus at home, A1C 9.4% on admission  - on admission glucose 1029, pH 7.13, bicarb 8, AG 37, BHB 9, K 6.0   - patient received insulin drip at 9units per hour while in MICU, adjusted per DKA protocol    PLAN  - ctm sugars with meals and bedtime - goal 140 - 180;   - endocrinology recommendations appreciated  - Lantus 32 sq for bedtime basal insulin  - Admelog 10 before breakfast and 4 before dinner    -- these are discharge recs as well and will follow with endocrine outpatient  - LDISS for both meals and bedtime  - DISCHARGE: will not d/c on a pump.  pt is willing to be discharged on insulin pens.  She knows how to inject - doses as above of trujeo/yao   - Pt. will f/u with Dr. Sophia Terrell post-discharge

## 2021-12-31 NOTE — DISCHARGE NOTE NURSING/CASE MANAGEMENT/SOCIAL WORK - NSDCPEFALRISK_GEN_ALL_CORE
For information on Fall & Injury Prevention, visit: [email or web address] OR  [email or web address] OR  https://www.cdc.gov/steadi/patient.html

## 2021-12-31 NOTE — PROGRESS NOTE ADULT - PROBLEM SELECTOR PROBLEM 11
SIRS without infection or organ dysfunction
SIRS without infection or organ dysfunction
Prophylactic measure
SIRS without infection or organ dysfunction
Prophylactic measure
SIRS without infection or organ dysfunction

## 2021-12-31 NOTE — DISCHARGE NOTE NURSING/CASE MANAGEMENT/SOCIAL WORK - PATIENT PORTAL LINK FT
You can access the FollowMyHealth Patient Portal offered by [facility] by registering at the following website: [email or web address]. By joining [employer]’s FollowMyHealth portal, you will also be able to view your health information using other applications (apps) compatible with our system.

## 2021-12-31 NOTE — PROGRESS NOTE ADULT - PROBLEM SELECTOR PLAN 5
Patient has Creon on med rec with meals and snacks, likely in the setting of pancreatic insufficiency due to prolonged diabetes.    PLAN  - c/w Creon 83202 units 6 capsules w/ breakfast, lunch, dinner  - c/w Creon 77556 units 3 capsule w/ snacks

## 2021-12-31 NOTE — PROGRESS NOTE ADULT - REASON FOR ADMISSION
Hyperglycemia

## 2021-12-31 NOTE — PROGRESS NOTE ADULT - PROBLEM SELECTOR PLAN 2
-pt reported choking with liquids 12/26  -cinesophogram ordered and completed  -no issues with choking today.  - recommendations include:  - regular diet with thin liquids  - upright position, small bites, chewing solids well, small single cup sip of thin liquids  - asp and reflux precautions  - good oral hygiene

## 2021-12-31 NOTE — PROGRESS NOTE ADULT - SUBJECTIVE AND OBJECTIVE BOX
Chris Gaxiola MD  Internal Medicine PGY-1  Pager NS: [phone] // [facility]: [phone]    PROGRESS NOTE:     Patient is a 71y old  Female who presents with a chief complaint of Hyperglycemia (30 Dec 2021 12:55)      SUBJECTIVE / OVERNIGHT EVENTS:    No acute events overnight. Patient examined at bedside with no acute complaints.     REVIEW OF SYSTEMS:    CONSTITUTIONAL: No weakness, fevers or chills  EYES/ENT: No visual changes;  No vertigo or throat pain   NECK: No pain or stiffness  RESPIRATORY: No cough, wheezing, hemoptysis; No shortness of breath  CARDIOVASCULAR: No chest pain or palpitations  GASTROINTESTINAL: No abdominal or epigastric pain. No nausea, vomiting, or hematemesis; No diarrhea or constipation. No melena or hematochezia.  GENITOURINARY: No dysuria, frequency or hematuria  NEUROLOGICAL: No numbness or weakness  SKIN: No itching, rashes      MEDICATIONS  (STANDING):  atorvastatin 40 milliGRAM(s) Oral at bedtime  carbidopa/levodopa  25/100 1 Tablet(s) Oral three times a day  chlorhexidine 2% Cloths 1 Application(s) Topical daily  dextrose 40% Gel 15 Gram(s) Oral once  dextrose 5%. 1000 milliLiter(s) (50 mL/Hr) IV Continuous <Continuous>  dextrose 5%. 1000 milliLiter(s) (100 mL/Hr) IV Continuous <Continuous>  dextrose 50% Injectable 25 Gram(s) IV Push once  dextrose 50% Injectable 12.5 Gram(s) IV Push once  dextrose 50% Injectable 25 Gram(s) IV Push once  escitalopram 10 milliGRAM(s) Oral daily  FIRST- Mouthwash  BLM 10 milliLiter(s) Swish and Spit every 12 hours  fluticasone propionate 50 MICROgram(s)/spray Nasal Spray 1 Spray(s) Both Nostrils two times a day  glucagon  Injectable 1 milliGRAM(s) IntraMuscular once  heparin   Injectable 7500 Unit(s) SubCutaneous every 8 hours  insulin glargine Injectable (LANTUS) 32 Unit(s) SubCutaneous <User Schedule>  insulin lispro (ADMELOG) corrective regimen sliding scale   SubCutaneous at bedtime  insulin lispro (ADMELOG) corrective regimen sliding scale   SubCutaneous three times a day before meals  insulin lispro Injectable (ADMELOG) 10 Unit(s) SubCutaneous before breakfast  insulin lispro Injectable (ADMELOG) 4 Unit(s) SubCutaneous before dinner  levothyroxine 125 MICROGram(s) Oral daily  loratadine 10 milliGRAM(s) Oral daily  LORazepam     Tablet 0.5 milliGRAM(s) Oral at bedtime  losartan 100 milliGRAM(s) Oral daily  pancrelipase  (CREON 12,000 Lipase Units) 6 Capsule(s) Oral three times a day with meals  pantoprazole    Tablet 40 milliGRAM(s) Oral before breakfast  rasagiline Tablet 1 milliGRAM(s) Oral daily    MEDICATIONS  (PRN):  acetaminophen     Tablet .. 650 milliGRAM(s) Oral every 6 hours PRN Temp greater or equal to 38C (100.4F), Mild Pain (1 - 3)  benzocaine 15 mG/menthol 3.6 mG (Sugar-Free) Lozenge 1 Lozenge Oral every 4 hours PRN Mouth Sores  sodium chloride 0.65% Nasal 1 Spray(s) Both Nostrils two times a day PRN Nasal Congestion      CAPILLARY BLOOD GLUCOSE      POCT Blood Glucose.: 86 mg/dL (30 Dec 2021 23:02)  POCT Blood Glucose.: 237 mg/dL (30 Dec 2021 18:00)  POCT Blood Glucose.: 181 mg/dL (30 Dec 2021 12:56)  POCT Blood Glucose.: 226 mg/dL (30 Dec 2021 11:54)  POCT Blood Glucose.: 343 mg/dL (30 Dec 2021 09:13)    I&O's Summary    30 Dec 2021 07:01  -  31 Dec 2021 07:00  --------------------------------------------------------  IN: 650 mL / OUT: 900 mL / NET: -250 mL        VITALS:   T(C): 36.8 (12-31-21 @ 06:20), Max: 36.8 (12-30-21 @ 23:05)  HR: 73 (12-31-21 @ 06:20) (73 - 87)  BP: 157/83 (12-31-21 @ 06:20) (115/70 - 157/83)  RR: 17 (12-31-21 @ 06:20) (17 - 18)  SpO2: 100% (12-31-21 @ 06:20) (98% - 100%)    GENERAL: NAD, lying in bed comfortably  HEAD:  Atraumatic, normocephalic  EYES: EOMI, PERRLA, conjunctiva and sclera clear  ENT: Moist mucous membranes  NECK: Supple, no JVD  HEART: Regular rate and rhythm, no murmurs, rubs, or gallops  LUNGS: Unlabored respirations.  Clear to auscultation bilaterally, no crackles, wheezing, or rhonchi  ABDOMEN: Soft, nontender, nondistended, +BS  EXTREMITIES: 2+ peripheral pulses bilaterally. No clubbing, cyanosis, or edema  NERVOUS SYSTEM:  A&Ox3, no focal deficits   SKIN: No rashes or lesions    LABS:                        12.1   10.05 )-----------( 201      ( 29 Dec 2021 07:47 )             39.2     12-29    137  |  101  |  17  ----------------------------<  163<H>  4.0   |  26  |  0.78    Ca    9.0      29 Dec 2021 07:47  Phos  2.9     12-29  Mg     1.80     12-29    TPro  5.6<L>  /  Alb  2.9<L>  /  TBili  0.5  /  DBili  x   /  AST  67<H>  /  ALT  17  /  AlkPhos  178<H>  12-29                RADIOLOGY & ADDITIONAL TESTS:  Results Reviewed:   Imaging Personally Reviewed:  Electrocardiogram Personally Reviewed:    COORDINATION OF CARE:  Care Discussed with Consultants/Other Providers [Y/N]:  Prior or Outpatient Records Reviewed [Y/N]:   Chris Gaxiola MD  Internal Medicine PGY-1  Pager NS: [phone] // [facility]: [phone]    PROGRESS NOTE:     Patient is a 71y old  Female who presents with a chief complaint of Hyperglycemia (30 Dec 2021 12:55)      SUBJECTIVE / OVERNIGHT EVENTS:    No acute events overnight. Patient examined at bedside with no acute complaints.     REVIEW OF SYSTEMS:    CONSTITUTIONAL: No weakness, fevers or chills  EYES/ENT: No visual changes;  No vertigo or throat pain, mild pain at roof of mouth improved from yesterday.  NECK: No pain or stiffness  RESPIRATORY: No cough, wheezing, hemoptysis; No shortness of breath  CARDIOVASCULAR: No chest pain or palpitations  GASTROINTESTINAL: No abdominal or epigastric pain. No nausea, vomiting, or hematemesis; No diarrhea or constipation. No melena or hematochezia.  GENITOURINARY: No dysuria, frequency or hematuria  NEUROLOGICAL: No numbness or weakness  SKIN: No itching, rashes      MEDICATIONS  (STANDING):  atorvastatin 40 milliGRAM(s) Oral at bedtime  carbidopa/levodopa  25/100 1 Tablet(s) Oral three times a day  chlorhexidine 2% Cloths 1 Application(s) Topical daily  dextrose 40% Gel 15 Gram(s) Oral once  dextrose 5%. 1000 milliLiter(s) (50 mL/Hr) IV Continuous <Continuous>  dextrose 5%. 1000 milliLiter(s) (100 mL/Hr) IV Continuous <Continuous>  dextrose 50% Injectable 25 Gram(s) IV Push once  dextrose 50% Injectable 12.5 Gram(s) IV Push once  dextrose 50% Injectable 25 Gram(s) IV Push once  escitalopram 10 milliGRAM(s) Oral daily  FIRST- Mouthwash  BLM 10 milliLiter(s) Swish and Spit every 12 hours  fluticasone propionate 50 MICROgram(s)/spray Nasal Spray 1 Spray(s) Both Nostrils two times a day  glucagon  Injectable 1 milliGRAM(s) IntraMuscular once  heparin   Injectable 7500 Unit(s) SubCutaneous every 8 hours  insulin glargine Injectable (LANTUS) 32 Unit(s) SubCutaneous <User Schedule>  insulin lispro (ADMELOG) corrective regimen sliding scale   SubCutaneous at bedtime  insulin lispro (ADMELOG) corrective regimen sliding scale   SubCutaneous three times a day before meals  insulin lispro Injectable (ADMELOG) 10 Unit(s) SubCutaneous before breakfast  insulin lispro Injectable (ADMELOG) 4 Unit(s) SubCutaneous before dinner  levothyroxine 125 MICROGram(s) Oral daily  loratadine 10 milliGRAM(s) Oral daily  LORazepam     Tablet 0.5 milliGRAM(s) Oral at bedtime  losartan 100 milliGRAM(s) Oral daily  pancrelipase  (CREON 12,000 Lipase Units) 6 Capsule(s) Oral three times a day with meals  pantoprazole    Tablet 40 milliGRAM(s) Oral before breakfast  rasagiline Tablet 1 milliGRAM(s) Oral daily    MEDICATIONS  (PRN):  acetaminophen     Tablet .. 650 milliGRAM(s) Oral every 6 hours PRN Temp greater or equal to 38C (100.4F), Mild Pain (1 - 3)  benzocaine 15 mG/menthol 3.6 mG (Sugar-Free) Lozenge 1 Lozenge Oral every 4 hours PRN Mouth Sores  sodium chloride 0.65% Nasal 1 Spray(s) Both Nostrils two times a day PRN Nasal Congestion      CAPILLARY BLOOD GLUCOSE      POCT Blood Glucose.: 86 mg/dL (30 Dec 2021 23:02)  POCT Blood Glucose.: 237 mg/dL (30 Dec 2021 18:00)  POCT Blood Glucose.: 181 mg/dL (30 Dec 2021 12:56)  POCT Blood Glucose.: 226 mg/dL (30 Dec 2021 11:54)  POCT Blood Glucose.: 343 mg/dL (30 Dec 2021 09:13)    I&O's Summary    30 Dec 2021 07:01  -  31 Dec 2021 07:00  --------------------------------------------------------  IN: 650 mL / OUT: 900 mL / NET: -250 mL        VITALS:   T(C): 36.8 (12-31-21 @ 06:20), Max: 36.8 (12-30-21 @ 23:05)  HR: 73 (12-31-21 @ 06:20) (73 - 87)  BP: 157/83 (12-31-21 @ 06:20) (115/70 - 157/83)  RR: 17 (12-31-21 @ 06:20) (17 - 18)  SpO2: 100% (12-31-21 @ 06:20) (98% - 100%)    GENERAL: NAD, lying in bed comfortably  HEAD:  Atraumatic, normocephalic  EYES: EOMI, PERRLA, conjunctiva and sclera clear  ENT: Moist mucous membranes  NECK: Supple, no JVD  HEART: Regular rate and rhythm, no murmurs, rubs, or gallops  LUNGS: Unlabored respirations.  Clear to auscultation bilaterally, no crackles, wheezing, or rhonchi  ABDOMEN: Soft, nontender, nondistended, +BS  EXTREMITIES: 2+ peripheral pulses bilaterally. No clubbing, cyanosis, or edema  NERVOUS SYSTEM:  A&Ox3, no focal deficits   SKIN: No rashes or lesions    LABS:                        12.1   10.05 )-----------( 201      ( 29 Dec 2021 07:47 )             39.2     12-29    137  |  101  |  17  ----------------------------<  163<H>  4.0   |  26  |  0.78    Ca    9.0      29 Dec 2021 07:47  Phos  2.9     12-29  Mg     1.80     12-29    TPro  5.6<L>  /  Alb  2.9<L>  /  TBili  0.5  /  DBili  x   /  AST  67<H>  /  ALT  17  /  AlkPhos  178<H>  12-29    RADIOLOGY & ADDITIONAL TESTS:  Results Reviewed:   Imaging Personally Reviewed:  Electrocardiogram Personally Reviewed:    COORDINATION OF CARE:  Care Discussed with Consultants/Other Providers [Y/N]:  Prior or Outpatient Records Reviewed [Y/N]:

## 2021-12-31 NOTE — PROGRESS NOTE ADULT - PROBLEM SELECTOR PLAN 11
#SIRS   - patient with leukocytosis and tachycardia on admission   - UA negative for infection, CXR clear lungs   - follow up RVP , COVID (neg), and blood cultures   - currently monitoring off antibiotics, higher suspicion for viral illness     PLAN  - urine culture is pending    -- greater than 3 organisms, possibly contaminated
#SIRS   - patient with leukocytosis and tachycardia on admission   - UA negative for infection, CXR clear lungs   - follow up RVP , COVID (neg), and blood cultures   - currently monitoring off antibiotics, higher suspicion for viral illness     PLAN  - urine culture is pending    -- greater than 3 organisms, possibly contaminated
DVT: [facility]  Code: Full  Diet: consistent carb, soft and bite sized
#SIRS   - patient with leukocytosis and tachycardia on admission   - UA negative for infection, CXR clear lungs   - follow up RVP , COVID (neg), and blood cultures   - currently monitoring off antibiotics, higher suspicion for viral illness     PLAN  - urine culture is pending    -- greater than 3 organisms, possibly contaminated
#SIRS   - patient with leukocytosis and tachycardia on admission   - UA negative for infection, CXR clear lungs   - follow up RVP , COVID (neg), and blood cultures   - currently monitoring off antibiotics, higher suspicion for viral illness     PLAN  - urine culture is pending    -- greater than 3 organisms, possibly contaminated
DVT: [facility]  Code: Full  Diet: consistent carb, soft and bite sized

## 2022-01-03 PROBLEM — E10.9 TYPE 1 DIABETES MELLITUS WITHOUT COMPLICATIONS: Chronic | Status: ACTIVE | Noted: 2021-12-23

## 2022-01-04 ENCOUNTER — NON-APPOINTMENT (OUTPATIENT)
Age: 72
End: 2022-01-04

## 2022-01-06 ENCOUNTER — APPOINTMENT (OUTPATIENT)
Dept: ENDOCRINOLOGY | Facility: CLINIC | Age: 72
End: 2022-01-06

## 2022-01-07 ENCOUNTER — NON-APPOINTMENT (OUTPATIENT)
Age: 72
End: 2022-01-07

## 2022-01-11 ENCOUNTER — APPOINTMENT (OUTPATIENT)
Dept: ENDOCRINOLOGY | Facility: CLINIC | Age: 72
End: 2022-01-11

## 2022-01-19 ENCOUNTER — NON-APPOINTMENT (OUTPATIENT)
Age: 72
End: 2022-01-19

## 2022-02-10 ENCOUNTER — APPOINTMENT (OUTPATIENT)
Dept: ENDOCRINOLOGY | Facility: CLINIC | Age: 72
End: 2022-02-10
Payer: MEDICARE

## 2022-02-10 VITALS
SYSTOLIC BLOOD PRESSURE: 145 MMHG | BODY MASS INDEX: 34.99 KG/M2 | HEIGHT: 65 IN | OXYGEN SATURATION: 97 % | DIASTOLIC BLOOD PRESSURE: 83 MMHG | WEIGHT: 210 LBS | HEART RATE: 72 BPM

## 2022-02-10 LAB
GLUCOSE BLDC GLUCOMTR-MCNC: 205
HBA1C MFR BLD HPLC: 8.2

## 2022-02-10 PROCEDURE — 83036 HEMOGLOBIN GLYCOSYLATED A1C: CPT | Mod: QW

## 2022-02-10 PROCEDURE — 82962 GLUCOSE BLOOD TEST: CPT

## 2022-02-10 RX ORDER — DASIGLUCAGON 0.6 MG/.6ML
0.6 INJECTION, SOLUTION SUBCUTANEOUS
Qty: 2 | Refills: 2 | Status: ACTIVE | COMMUNITY
Start: 2022-02-10

## 2022-02-10 RX ORDER — URINE ACETONE TEST STRIPS
STRIP MISCELLANEOUS
Qty: 1 | Refills: 2 | Status: ACTIVE | COMMUNITY
Start: 2022-02-10

## 2022-02-24 ENCOUNTER — APPOINTMENT (OUTPATIENT)
Dept: ENDOCRINOLOGY | Facility: CLINIC | Age: 72
End: 2022-02-24
Payer: MEDICARE

## 2022-02-24 VITALS
OXYGEN SATURATION: 96 % | SYSTOLIC BLOOD PRESSURE: 133 MMHG | HEART RATE: 72 BPM | WEIGHT: 220 LBS | DIASTOLIC BLOOD PRESSURE: 82 MMHG | BODY MASS INDEX: 36.65 KG/M2 | HEIGHT: 65 IN

## 2022-02-24 LAB — GLUCOSE BLDC GLUCOMTR-MCNC: 93

## 2022-02-24 PROCEDURE — 99214 OFFICE O/P EST MOD 30 MIN: CPT | Mod: 25

## 2022-02-24 PROCEDURE — 95251 CONT GLUC MNTR ANALYSIS I&R: CPT

## 2022-02-24 NOTE — HISTORY OF PRESENT ILLNESS
[Kristie] : Kristie [FreeTextEntry1] : Patient is here today for followup visit . She has  a history of thyroid cancer . She denies any hoarseness or upper respiratory infection symptoms.Given her history of thyroid cancer she is nervous about this. A neck sonogram was done and except for some small  benign lymph nodes it was negative in February and stable in July 2020 and June 2021. . . . . \par She is currently on MDI for type 1 diabetes and uses the FreeStyle sensor; due to some episodes in the past of hypoglycemia unawareness and she lives alone, she now has Kristie sensor.. She was trained on the T slim and DexCom but ended up hospitalized for high sugars end of December so no longer comfortable using the pump. She was in [facility]ab after do to difficulty walking. She is using 26 u of Toujeo and a formula for mealtime Apidra insulin.  \par She recently saw a neuroophthalmologist for dilated left pupil. She had an MRA done.\par  [FreeTextEntry2] : 61 [FreeTextEntry3] : 22+9 [FreeTextEntry4] : 8 [de-identified] : 7.0 [FreeTextEntry5] : 154 [FreeTextEntry6] : 41.5

## 2022-02-24 NOTE — ASSESSMENT
[FreeTextEntry1] : 1)  rhytid cancer history/hypothyroidism  - Patient generally does a neck sonogram yearly for her  history of papillary thyroid cancer. No change in Tirosint dose.TG ab lower, last neck sonogram stable. \par 2)  type 1 Diabetes  - will remain off  Tandem pump, she is using the  Kristie sensor  will  lower basal insulin from 26 to 24 u  and have HS snack especially when sees it dropping on the sensor. Continue to take 4 injections of insulin daily. Monitors sugar 6-8 times daily.  Carb ratio 15 Target 159 before meals and now 200 after meals , CF 50. Always factor in arrows and if before eating and sugar under 80 have 4 OZ OJ  or equivalent first. Must focus first and avoiding low sugars, high % on sensor. Will see CDE in a few weeks. \par Encourage healthy lifestyle including a low carb diet and exercise as tolerated. Monitor blood sugars as directed and bring meter/sensor   to all visits.\par 3) BD done 6/2021 . Lowest site RFN   T score - 2.0 \par f/u CDE in one mos. will do labs then  \par

## 2022-03-14 ENCOUNTER — LABORATORY RESULT (OUTPATIENT)
Age: 72
End: 2022-03-14

## 2022-03-16 ENCOUNTER — APPOINTMENT (OUTPATIENT)
Dept: ENDOCRINOLOGY | Facility: CLINIC | Age: 72
End: 2022-03-16
Payer: MEDICARE

## 2022-03-16 VITALS
OXYGEN SATURATION: 97 % | WEIGHT: 220 LBS | DIASTOLIC BLOOD PRESSURE: 81 MMHG | SYSTOLIC BLOOD PRESSURE: 160 MMHG | BODY MASS INDEX: 36.65 KG/M2 | HEART RATE: 67 BPM | HEIGHT: 65 IN

## 2022-03-16 LAB — GLUCOSE BLDC GLUCOMTR-MCNC: 190

## 2022-03-16 PROCEDURE — G0108 DIAB MANAGE TRN  PER INDIV: CPT

## 2022-03-16 PROCEDURE — 82962 GLUCOSE BLOOD TEST: CPT

## 2022-03-17 ENCOUNTER — LABORATORY RESULT (OUTPATIENT)
Age: 72
End: 2022-03-17

## 2022-03-17 LAB
25(OH)D3 SERPL-MCNC: 35.3 NG/ML
ALBUMIN SERPL ELPH-MCNC: 4.1 G/DL
ALP BLD-CCNC: 104 U/L
ALT SERPL-CCNC: 35 U/L
ANION GAP SERPL CALC-SCNC: 15 MMOL/L
AST SERPL-CCNC: 30 U/L
BASOPHILS # BLD AUTO: 0.12 K/UL
BASOPHILS NFR BLD AUTO: 1.2 %
BILIRUB SERPL-MCNC: 0.7 MG/DL
BUN SERPL-MCNC: 24 MG/DL
CALCIUM SERPL-MCNC: 9.5 MG/DL
CHLORIDE SERPL-SCNC: 105 MMOL/L
CHOLEST SERPL-MCNC: 131 MG/DL
CO2 SERPL-SCNC: 22 MMOL/L
CREAT SERPL-MCNC: 0.77 MG/DL
CREAT SPEC-SCNC: 150 MG/DL
EGFR: 81 ML/MIN/1.73M2
EOSINOPHIL # BLD AUTO: 0.29 K/UL
EOSINOPHIL NFR BLD AUTO: 2.9 %
ESTIMATED AVERAGE GLUCOSE: 186 MG/DL
GLUCOSE SERPL-MCNC: 193 MG/DL
HBA1C MFR BLD HPLC: 8.1 %
HCT VFR BLD CALC: 37.9 %
HDLC SERPL-MCNC: 57 MG/DL
HGB BLD-MCNC: 12 G/DL
IMM GRANULOCYTES NFR BLD AUTO: 0.3 %
LDLC SERPL CALC-MCNC: 61 MG/DL
LYMPHOCYTES # BLD AUTO: 2.71 K/UL
LYMPHOCYTES NFR BLD AUTO: 26.8 %
MAN DIFF?: NORMAL
MCHC RBC-ENTMCNC: 29.1 PG
MCHC RBC-ENTMCNC: 31.7 GM/DL
MCV RBC AUTO: 92 FL
MICROALBUMIN 24H UR DL<=1MG/L-MCNC: 1.7 MG/DL
MICROALBUMIN/CREAT 24H UR-RTO: 11 MG/G
MONOCYTES # BLD AUTO: 0.78 K/UL
MONOCYTES NFR BLD AUTO: 7.7 %
NEUTROPHILS # BLD AUTO: 6.19 K/UL
NEUTROPHILS NFR BLD AUTO: 61.1 %
NONHDLC SERPL-MCNC: 74 MG/DL
PLATELET # BLD AUTO: 248 K/UL
POTASSIUM SERPL-SCNC: 4.6 MMOL/L
PROT SERPL-MCNC: 6.1 G/DL
RBC # BLD: 4.12 M/UL
RBC # FLD: 15.8 %
SODIUM SERPL-SCNC: 142 MMOL/L
T4 FREE SERPL-MCNC: 1.4 NG/DL
T4 SERPL-MCNC: 8 UG/DL
TRIGL SERPL-MCNC: 64 MG/DL
TSH SERPL-ACNC: 6.24 UIU/ML
WBC # FLD AUTO: 10.12 K/UL

## 2022-03-21 LAB
APPEARANCE: CLEAR
BILIRUBIN URINE: NEGATIVE
BLOOD URINE: NEGATIVE
COLOR: NORMAL
GLUCOSE QUALITATIVE U: ABNORMAL
KETONES URINE: NEGATIVE
LEUKOCYTE ESTERASE URINE: ABNORMAL
NITRITE URINE: NEGATIVE
PH URINE: 6
PROTEIN URINE: NEGATIVE
SPECIFIC GRAVITY URINE: >=1.03
UROBILINOGEN URINE: NORMAL

## 2022-03-24 LAB — THYROGLOB AB SERPL IA-ACNC: 4.7 IU/ML

## 2022-04-11 ENCOUNTER — TRANSCRIPTION ENCOUNTER (OUTPATIENT)
Age: 72
End: 2022-04-11

## 2022-04-12 ENCOUNTER — APPOINTMENT (OUTPATIENT)
Dept: DISASTER EMERGENCY | Facility: HOSPITAL | Age: 72
End: 2022-04-12

## 2022-04-12 ENCOUNTER — OUTPATIENT (OUTPATIENT)
Dept: OUTPATIENT SERVICES | Facility: HOSPITAL | Age: 72
LOS: 1 days | End: 2022-04-12

## 2022-04-12 VITALS
RESPIRATION RATE: 18 BRPM | WEIGHT: 210.1 LBS | TEMPERATURE: 98 F | DIASTOLIC BLOOD PRESSURE: 85 MMHG | OXYGEN SATURATION: 95 % | HEART RATE: 73 BPM | HEIGHT: 66 IN | SYSTOLIC BLOOD PRESSURE: 140 MMHG

## 2022-04-12 VITALS
TEMPERATURE: 98 F | RESPIRATION RATE: 18 BRPM | DIASTOLIC BLOOD PRESSURE: 72 MMHG | HEART RATE: 78 BPM | SYSTOLIC BLOOD PRESSURE: 128 MMHG | OXYGEN SATURATION: 96 %

## 2022-04-12 DIAGNOSIS — Z98.890 OTHER SPECIFIED POSTPROCEDURAL STATES: Chronic | ICD-10-CM

## 2022-04-12 DIAGNOSIS — Z47.1 AFTERCARE FOLLOWING JOINT REPLACEMENT SURGERY: Chronic | ICD-10-CM

## 2022-04-12 DIAGNOSIS — U07.1 COVID-19: ICD-10-CM

## 2022-04-12 RX ORDER — SODIUM CHLORIDE 9 MG/ML
250 INJECTION INTRAMUSCULAR; INTRAVENOUS; SUBCUTANEOUS
Refills: 0 | Status: DISCONTINUED | OUTPATIENT
Start: 2022-04-12 | End: 2022-04-26

## 2022-04-12 RX ORDER — BEBTELOVIMAB 87.5 MG/ML
175 INJECTION, SOLUTION INTRAVENOUS ONCE
Refills: 0 | Status: COMPLETED | OUTPATIENT
Start: 2022-04-12 | End: 2022-04-12

## 2022-04-12 RX ADMIN — SODIUM CHLORIDE 100 MILLILITER(S): 9 INJECTION INTRAMUSCULAR; INTRAVENOUS; SUBCUTANEOUS at 12:06

## 2022-04-12 RX ADMIN — BEBTELOVIMAB 175 MILLIGRAM(S): 87.5 INJECTION, SOLUTION INTRAVENOUS at 12:05

## 2022-04-12 NOTE — CHART NOTE - NSCHARTNOTEFT_GEN_A_CORE
CC: Monoclonal Antibody Infusion/COVID 19 Positive on 4/11/22  71y Female with recent dx of COVID 19+ who presents today for elective Bebtelovimab. Patient has been screened and was deemed to be a candidate.    Symptoms/ Criteria  Date of Symptom Onset: 4/11/22  Symptoms: fever, cough, congestion, HA  Date of Positive COVID PCR: 4/11/22  Risk Profile includes:   DM, Age >65, Parkinson's Dx, thyroid CA    Vaccination Status: Fully vaccinated with Moderna Boosters x2 last was 2/2022    PMHx:  Infection due to severe acute respiratory syndrome coronavirus 2 (SARS-CoV-2)    Thyroid cancer    H/O thyroidectomy    Melanoma    DM type 1, not at goal    H/O basal cell carcinoma excision    Aftercare following right shoulder joint replacement surgery        Exam/findings:  T(C): 36.4 (04-12-22 @ 11:55), Max: 36.4 (04-12-22 @ 11:55)  HR: 73 (04-12-22 @ 11:55) (73 - 73)  BP: 140/85 (04-12-22 @ 11:55) (140/85 - 140/85)  RR: 18 (04-12-22 @ 11:55) (18 - 18)  SpO2: 95% (04-12-22 @ 11:55) (95% - 95%)    PE:   Appearance: NAD	  HEENT:  NC/AT  Cardiovascular:  No edema  Respiratory: no use of accessory muscles  Gastrointestinal:  non-distended   Skin: warm and dry  Neurologic: Non-focal  Extremities: Normal range of motion    ASSESSMENT:  Pt is COVID positive with mild to moderate symptoms who was referred for elective MAB (Bebtelovimab).    PLAN:  - MAB treatment explained to patient. I have reviewed the Bebtelovimab Emergency Use Authorization (EUA) and I have provided the patient or patient's caregiver with the following information:   1. FDA has authorized emergency use of Bebtelovimab to be administered for the treatment of mild to moderate COVID-19, which is not an FDA-approved biological product.   2. The patient or patient's caregiver has the option to accept or refuse administration of MAB.   3. The significant known and potential risks and benefits of Bebtelovimab and the extent to which such risks and benefits are unknown.  4. Information on available alternative treatments and risks and benefits of those alternatives.  - Patient verbalized understanding of plan and agrees to treatment. All questions answered.  - Consent for MAB obtained.   - 175mg of Bebtelovimab administered as a single intravenous injection over at least 30 seconds.   - Observe patient for one hour post medication administration and then if stable, discharge home with oupatient follow up as planned by PCP.      POST ASSESSMENT:   Patient completed MAB, and monitored x 1 hour post-infusion with no adverse reactions noted, remained hemodynamically stable.  - Patient tolerated infusion well; denies complaints of chest pain/SOB/dizziness/palpitations.   - VSS for discharge home.  - D/C instructions given/ fact sheet included.  - Patient was instructed to self-isolate and use infection control measures (e.g wear mask, isolate, social distance, avoid sharing personal items, clean and disinfect "high touch" surfaces, and frequent handwashing according to the CDC guidelines.   - The patient was informed on what symptoms to be aware of for the next couple of days, and if there are any issues to call the 24/7 clinical call center. Patient was instructed to follow up with primary care provider as needed.  -Discharge 1 hour post-injection

## 2022-04-13 ENCOUNTER — APPOINTMENT (OUTPATIENT)
Dept: PAIN MANAGEMENT | Facility: CLINIC | Age: 72
End: 2022-04-13

## 2022-04-25 ENCOUNTER — TRANSCRIPTION ENCOUNTER (OUTPATIENT)
Age: 72
End: 2022-04-25

## 2022-05-09 ENCOUNTER — APPOINTMENT (OUTPATIENT)
Dept: ENDOCRINOLOGY | Facility: CLINIC | Age: 72
End: 2022-05-09
Payer: MEDICARE

## 2022-05-09 ENCOUNTER — LABORATORY RESULT (OUTPATIENT)
Age: 72
End: 2022-05-09

## 2022-05-09 VITALS
HEIGHT: 65 IN | OXYGEN SATURATION: 96 % | SYSTOLIC BLOOD PRESSURE: 136 MMHG | BODY MASS INDEX: 34.99 KG/M2 | WEIGHT: 210 LBS | HEART RATE: 70 BPM | DIASTOLIC BLOOD PRESSURE: 71 MMHG

## 2022-05-09 LAB — GLUCOSE BLDC GLUCOMTR-MCNC: 78

## 2022-05-09 PROCEDURE — 99214 OFFICE O/P EST MOD 30 MIN: CPT | Mod: 25

## 2022-05-09 PROCEDURE — 36415 COLL VENOUS BLD VENIPUNCTURE: CPT

## 2022-05-09 PROCEDURE — 82962 GLUCOSE BLOOD TEST: CPT

## 2022-05-09 PROCEDURE — 95251 CONT GLUC MNTR ANALYSIS I&R: CPT

## 2022-05-09 NOTE — PHYSICAL EXAM
[Alert] : alert [Well Nourished] : well nourished [No Acute Distress] : no acute distress [Well Developed] : well developed [Normal Sclera/Conjunctiva] : normal sclera/conjunctiva [EOMI] : extra ocular movement intact [No Proptosis] : no proptosis [Normal Oropharynx] : the oropharynx was normal [Well Healed Scar] : well healed scar [No Respiratory Distress] : no respiratory distress [Clear to Auscultation] : lungs were clear to auscultation bilaterally [Normal PMI] : the apical impulse was normal [Normal S1, S2] : normal S1 and S2 [Normal Rate] : heart rate was normal [Regular Rhythm] : with a regular rhythm [Pedal Pulses Normal] : the pedal pulses are present [Normal Bowel Sounds] : normal bowel sounds [Not Tender] : non-tender [Not Distended] : not distended [Soft] : abdomen soft [Normal Anterior Cervical Nodes] : no anterior cervical lymphadenopathy [No Spinal Tenderness] : no spinal tenderness [Spine Straight] : spine straight [Normal Reflexes] : deep tendon reflexes were 2+ and symmetric [Oriented x3] : oriented to person, place, and time [Normal Insight/Judgement] : insight and judgment were intact [Acanthosis Nigricans] : no acanthosis nigricans [de-identified] : trace pedal edema  [de-identified] : stasis dermatitis lower legs  [de-identified] : stiffness in gait

## 2022-05-09 NOTE — ASSESSMENT
[FreeTextEntry1] : 1)  rhytid cancer history/hypothyroidism  - Patient generally does a neck sonogram yearly for her  history of papillary thyroid cancer. No change in Tirosint dose.TG ab lower, last neck sonogram stable. TSH 6 in march will repeat. To do sonogram neck June. \par 2)  type 1 Diabetes  - will remain off  Tandem pump, she is using the  Kristie sensor  To have HS snack especially when sees it dropping on the sensor. Continue to take 4 injections of insulin daily. Monitors sugar 6-8 times daily.  Carb ratio 15 Target 159 before meals and now 200 after meals , CF 50. Always factor in arrows and if before eating and sugar under 80 have 4 OZ OJ  or equivalent first. Must focus first and avoiding low sugars, high % on sensor. Will see CDE in a few weeks. has agreed to in pen. \par Encourage healthy lifestyle including a low carb diet and exercise as tolerated. Monitor blood sugars as directed and bring meter/sensor   to all visits.\par 3) BD done 6/2021 . Lowest site RFN   T score - 2.0 \par f/u CDE in one mos. \par f/u 3 mos  [Diabetes Foot Care] : diabetes foot care [Long Term Vascular Complications] : long term vascular complications of diabetes [Carbohydrate Consistent Diet] : carbohydrate consistent diet [Importance of Diet and Exercise] : importance of diet and exercise to improve glycemic control, achieve weight loss and improve cardiovascular health [Hypoglycemia Management] : hypoglycemia management [Self Monitoring of Blood Glucose] : self monitoring of blood glucose [Diabetic Medications] : Risks and benefits of diabetic medications were discussed [Levothyroxine] : The patient was instructed to take Levothyroxine on an empty stomach, separate from vitamins, and wait at least 30 minutes before eating

## 2022-05-09 NOTE — HISTORY OF PRESENT ILLNESS
[FreeTextEntry1] : Patient is here today for followup visit . She has  a history of thyroid cancer . She denies any hoarseness or upper respiratory infection symptoms.Given her history of thyroid cancer she is nervous about this. A neck sonogram was done and except for some small  benign lymph nodes it was negative in February and stable in July 2020 and June 2021. . . . . \par She is currently on MDI for type 1 diabetes and uses the FreeStyle sensor; due to some episodes in the past of hypoglycemia unawareness and she lives alone, she now has Kristie sensor.. She was trained on the T slim and DexCom but ended up hospitalized for high sugars end of December so no longer comfortable using the pump. She was in rehab after do to difficulty walking. Recently had injections in her knees. She is using 22 u of Toujeo and a formula for mealtime Apidra insulin.  \par  [Continuous Glucose Monitoring] : Continuous Glucose Monitoring: Yes [Kristie] : Kristie [FreeTextEntry2] : 57 [FreeTextEntry3] : 34 [FreeTextEntry4] : 9 [de-identified] : 7.2 [FreeTextEntry5] : 164 [FreeTextEntry6] : 46.6

## 2022-05-11 LAB
T4 FREE SERPL-MCNC: 1.3 NG/DL
T4 SERPL-MCNC: 8.1 UG/DL
TSH SERPL-ACNC: 2.07 UIU/ML

## 2022-05-16 ENCOUNTER — NON-APPOINTMENT (OUTPATIENT)
Age: 72
End: 2022-05-16

## 2022-05-16 LAB — THYROGLOB AB SERPL IA-ACNC: 8.2 IU/ML

## 2022-05-23 ENCOUNTER — APPOINTMENT (OUTPATIENT)
Dept: PAIN MANAGEMENT | Facility: CLINIC | Age: 72
End: 2022-05-23
Payer: MEDICARE

## 2022-05-23 ENCOUNTER — APPOINTMENT (OUTPATIENT)
Dept: ENDOCRINOLOGY | Facility: CLINIC | Age: 72
End: 2022-05-23
Payer: MEDICARE

## 2022-05-23 ENCOUNTER — LABORATORY RESULT (OUTPATIENT)
Age: 72
End: 2022-05-23

## 2022-05-23 ENCOUNTER — RX RENEWAL (OUTPATIENT)
Age: 72
End: 2022-05-23

## 2022-05-23 VITALS
DIASTOLIC BLOOD PRESSURE: 65 MMHG | HEART RATE: 65 BPM | SYSTOLIC BLOOD PRESSURE: 134 MMHG | WEIGHT: 223 LBS | OXYGEN SATURATION: 96 % | BODY MASS INDEX: 37.15 KG/M2 | HEIGHT: 65 IN

## 2022-05-23 VITALS — WEIGHT: 223 LBS | BODY MASS INDEX: 37.15 KG/M2 | HEIGHT: 65 IN

## 2022-05-23 LAB — GLUCOSE BLDC GLUCOMTR-MCNC: 218

## 2022-05-23 PROCEDURE — J3490M: CUSTOM

## 2022-05-23 PROCEDURE — 82962 GLUCOSE BLOOD TEST: CPT

## 2022-05-23 PROCEDURE — 99214 OFFICE O/P EST MOD 30 MIN: CPT | Mod: 25

## 2022-05-23 PROCEDURE — 95251 CONT GLUC MNTR ANALYSIS I&R: CPT

## 2022-05-23 PROCEDURE — 20552 NJX 1/MLT TRIGGER POINT 1/2: CPT

## 2022-05-23 PROCEDURE — G0108 DIAB MANAGE TRN  PER INDIV: CPT

## 2022-05-23 RX ORDER — INSULIN LISPRO 100 [IU]/ML
100 INJECTION, SOLUTION INTRAVENOUS; SUBCUTANEOUS
Qty: 1 | Refills: 3 | Status: ACTIVE | COMMUNITY
Start: 2022-05-23

## 2022-05-23 NOTE — HISTORY OF PRESENT ILLNESS
[Lower back] : lower back [Dull/Aching] : dull/aching [Radiating] : radiating [Sharp] : sharp [] : yes [Constant] : constant [Household chores] : household chores [Leisure] : leisure [Sleep] : sleep [Social interactions] : social interactions [Injection therapy] : injection therapy [Sitting] : sitting [Standing] : standing [Walking] : walking [Bending forward] : bending forward [Exercising] : exercising [Stairs] : stairs [Lying in bed] : lying in bed

## 2022-05-23 NOTE — PROCEDURE
[Trigger point 1-2 muscle groups] : trigger point 1-2 muscle groups [Other: ____] : [unfilled] [Pain] : pain [Inflammation] : inflammation [Alcohol] : alcohol [Ethyl Chloride sprayed topically] : ethyl chloride sprayed topically [Sterile technique used] : sterile technique used [___ cc    1%] : Lidocaine ~Vcc of 1%  [___ cc    0.25%] : Bupivacaine (Marcaine) ~Vcc of 0.25%  [___ cc    10mg] : Triamcinolone (Kenalog) ~Vcc of 10 mg

## 2022-05-23 NOTE — PHYSICAL EXAM
[Normal Coordination] : normal coordination [Normal DTR UE/LE] : normal DTR UE/LE  [Normal Sensation] : normal sensation [Normal Mood and Affect] : normal mood and affect [Orientated] : orientated [Able to Communicate] : able to communicate [Normal Skin] : normal skin [No Rash] : no rash [No Ulcers] : no ulcers [No Lesions] : no lesions [No obvious lymphadenopathy in areas examined] : no obvious lymphadenopathy in areas examined [Well Developed] : well developed [Well Nourished] : well nourished [No Respiratory Distress] : no respiratory distress [] : mildly antalgic [FreeTextEntry9] : Declines [place] [FreeTextEntry8] : Iliotibial band tenderness

## 2022-05-24 LAB
APPEARANCE: ABNORMAL
BILIRUBIN URINE: NEGATIVE
BLOOD URINE: NEGATIVE
COLOR: YELLOW
GLUCOSE QUALITATIVE U: NORMAL
KETONES URINE: NEGATIVE
LEUKOCYTE ESTERASE URINE: ABNORMAL
NITRITE URINE: NEGATIVE
PH URINE: 6.5
PROTEIN URINE: ABNORMAL
SPECIFIC GRAVITY URINE: 1.03
UROBILINOGEN URINE: NORMAL

## 2022-05-26 ENCOUNTER — NON-APPOINTMENT (OUTPATIENT)
Age: 72
End: 2022-05-26

## 2022-05-26 LAB — BACTERIA UR CULT: NORMAL

## 2022-05-31 ENCOUNTER — RX RENEWAL (OUTPATIENT)
Age: 72
End: 2022-05-31

## 2022-05-31 RX ORDER — PEN NEEDLE, DIABETIC 31G X5/32"
31G X 4 MM NEEDLE, DISPOSABLE MISCELLANEOUS
Qty: 400 | Refills: 3 | Status: ACTIVE | COMMUNITY
Start: 2022-05-31 | End: 1900-01-01

## 2022-05-31 RX ORDER — ALCOHOL ANTISEPTIC PADS
PADS, MEDICATED (EA) TOPICAL
Qty: 400 | Refills: 4 | Status: ACTIVE | COMMUNITY
Start: 2022-05-31 | End: 1900-01-01

## 2022-06-14 ENCOUNTER — RX RENEWAL (OUTPATIENT)
Age: 72
End: 2022-06-14

## 2022-06-14 NOTE — PHYSICAL THERAPY INITIAL EVALUATION ADULT - LEVEL OF INDEPENDENCE: GAIT, REHAB EVAL
OFFICE VISIT      Patient: Nanette Scanlon   : 1949 MRN: [id number]    SUBJECTIVE:  Chief Complaint   Patient presents with   â¢ Medical Problem Re-evaluation     cancer and heart surgery and dm follow up    â¢ Office Visit       A 74year old female is here for a follow-up. Accompanied by support person. HISTORY OF PRESENT ILLNESS:    Patient has given consent to record this visit for documentation in their clinical record. Type 2 diabetes mellitus without complication, without long-term current use of insulin:  Has a history of type 2 diabetes mellitus. Followed by endocrinologist. Was on Jardiance, then tried Ozempic; however, had side effect of diarrhea which worsened. Also, had nausea and poor appetite with Ozempic. Was recommended to hold off Ozempic, and was prescribed Januvia on 06/10/2022. She went to  the medication on 2022; however, her insurance had asked her to pay 4000 dollars [facility]. Thus, she did not get Januvia. Was also on Glipizide in the past, which also worsened diarrhea. She was earlier on Janumet  mg, and states the diarrhea was not âthat badâ. Had discontinued Janumet a week after her open heart surgery. Was given insulin then at the hospital then. Inquires whether she should restart Janumet. Support person inquires about insulin. Patient states each of the insulin recommended by the endocrinologist would cost her 32 dollar/month with co-pay; however, [facility] Her last HbA1C was 6.7%. Her blood sugar yesterday morning(without Ozempic) was 139 mg/dL, and this morning (2022) it was 169 mg/dL. Coronary artery disease involving coronary bypass graft of native heart without angina pectoris:  Patient states her CT results showed abnormalities of her heart. Per patient, she underwent nuclear stress test by Cardiology, Dr. Dany Rosa in 2021 and no abnormality was detected.  States she never had chest pain or other symptoms of MI. She visited a cardiologist, Dr. Isaacs Doctor in [place]. Her echocardiogram showed enlarged left heart chambers. Her ejection fraction was 60-65%. Underwent test which showed coronary artery disease and underwent quadruple coronary bypass surgery in 10/2021. Underwent cardiac rehab. Requests for referral to cardiologist.     Chronic diarrhea:   Reports chronic diarrhea, which worsened with Hellen Fujisawa. She discontinued the medication a week ago. The most recent diarrhea episode was this morning. Had âexplosive diarrheaâ during surgeries, and underwent stool culture. Major depressive disorder with single episode, in full remission (CMS/[facility]):   Reports her mood is good, except on days when she has diarrhea episodes. On Fluoxetine. Contracture of joint of finger, unspecified laterality:   Reports pain and swelling of index fingers of both hands, they âlockâ at night. Has difficulty holding objects or picking things due to the pain. She places a popsicle stick to keep her fingers straight. She visited an orthopedist, and had cortisone injections which helped for a couple of months. The condition has worsened now. Requests for referral to a hand surgeon. Essential hypertension, benign: The blood pressure measured in the office today was 138/94 mmHg. Attributes to not taking the medication yesterday. H/O: lung cancer:   Patient was diagnosed with lung cancer at a [facility] on 12/16/2020. She then went to [facility] and underwent surgery on 01/21/2021, followed by chemotherapy for six months. Dyslipidemia associated with type 2 diabetes mellitus: Has a history of dyslipidemia associated with type 2 diabetes mellitus. Additional comments:  She rescheduled her colonoscopy. Canceled her previous appointment as she was in [place]. Breast cancer screening: [place] [facility]    Had toenail removal and bunion surgery of both feet. PAST MEDICAL HISTORY:  Past Medical History:   Diagnosis Date   â¢ Baker's cyst, ruptured     right   â¢ Breast cancer (CMS/[facility])     left   â¢ Bunion    â¢ Degenerative joint disease of knee, left     severe   â¢ DM2 (diabetes mellitus, type 2) (CMS/[facility])    â¢ HTN (hypertension)    â¢ Hypercholesterolemia    â¢ WILBER (obstructive sleep apnea)    â¢ RLS (restless legs syndrome)    â¢ Sleep apnea        MEDICATIONS:  Current Outpatient Medications   Medication Sig   â¢ metoPROLOL succinate (TOPROL-XL) 50 MG 24 hr tablet Take 50 mg by mouth daily. â¢ atorvastatin (LIPITOR) 80 MG tablet Take 1 tablet by mouth daily. â¢ FLUoxetine (PROzac) 20 MG capsule Take 20 mg by mouth. Take 1 cap by mouth 2 times daily   â¢ aspirin 81 MG tablet Take 81 mg by mouth daily. â¢ vitamin B-12 (CYANOCOBALAMIN) 1000 MCG tablet Take 1,000 mcg by mouth daily. â¢ irbesartan (AVAPRO) 300 MG tablet Take 300 mg by mouth daily. â¢ sitaGLIPTIN-metFORMIN (Janumet)  MG per tablet Take 1 tablet by mouth 2 times daily (with meals). No current facility-administered medications for this visit.        ALLERGIES:  ALLERGIES:  No Known Allergies    PAST SURGICAL HISTORY:  Past Surgical History:   Procedure Laterality Date   â¢ Breast lumpectomy      left   â¢ Bunionectomy  01/01/1986   â¢ Colonoscopy  06/15/2018    repeat colon in 5 yrs, adenoma polyps   â¢ Ct guided needle placement  12/28/2020    Lung Biopsy   â¢ Hysterectomy  01/01/1982    per patient   â¢ Ir lung biopsy  12/28/2020   â¢ Knee surgery  01/01/2005    left arthroscopy   â¢ Knee surgery  02/01/2013, 11/11/14    right arthroscopy   â¢ Nasal sinus surgery  01/01/2001       FAMILY HISTORY:  Family History   Problem Relation Age of Onset   â¢ Cancer Mother         breast   â¢ High cholesterol Mother    â¢ Arthritis Mother    â¢ Psychiatric Mother         memory loss   â¢ Heart disease Father    â¢ Diabetes Father    â¢ High blood pressure Father    â¢ Sleep Apnea Son    â¢ High blood pressure "Sister    â¢ High cholesterol Sister    â¢ Heart disease Brother    â¢ High cholesterol Brother    â¢ Cancer, Skin Melanoma Daughter        SOCIAL HISTORY:  Social History     Tobacco Use   Smoking Status Former Smoker   â¢ Packs/day: 0.50   â¢ Years: 40.00   â¢ Pack years: 20.00   â¢ Types: Cigarettes   â¢ Start date: 8/19/1974   Smokeless Tobacco Never Used   Tobacco Comment    cutting back     Social History     Substance and Sexual Activity   Alcohol Use Yes   â¢ Alcohol/week: 0.0 standard drinks    Comment: Social       ROS      Cardiovascular: Per HPI. Gastrointestinal: Per HPI. Metabolic/Endocrine: Per HPI. Psychiatric: Per HPI. Musculoskeletal: Per HPI. OBJECTIVE:    Vitals:    06/13/22 0854 06/13/22 0938   BP: (!) 138/96 (!) 138/94   Pulse: 72    Resp: 16    Weight: 59 kg (130 lb)    Height: 5' 2.5"" (1.588 m)        Body mass index is 23.4 kg/mÂ². PHYSICAL EXAM      Constitutional: Alert, in no acute distress and current vital signs reviewed. Head and Face: Atraumatic and normocephalic. Eyes: No discharge, no eyelid swelling and the sclerae were normal.   ENT: Oropharynx normal. Normal appearing outer ear. Tympanic membranes are bilaterally clear, normal appearing nose and normal lips. Neck: Normal appearing neck and supple neck. Pulmonary: Breath sounds clear to auscultation bilaterally, but no respiratory distress and normal respiratory rate and effort. Cardiovascular: Normal rate, regular rhythm, normal S1, normal S2 and edema was not present in the lower extremities. Well-healed incision scars in the midchest.   Abdomen: Soft and nontender. Musculoskeletal:  Contractures of tendons of index fingers of both hands. Mild deformities noted. Psychiatric: Alert and awake, interactive and mood/affect were appropriate. Skin, Hair, Nails: Normal skin color and pigmentation.   Diabetic Foot Exam Documentation     Normal Bilateral Foot Exam:  Skin integrity is normal. Dorsalis pedis and posterior " tibial pulses are present. Pressure sensation using the [place] monofilament is present. DIAGNOSTIC STUDIES:  LAB RESULTS:  Clinical Abstract on 05/24/2022   Component Date Value Ref Range Status   â¢ Hemoglobin A1C 05/03/2022 6.7  % Final       ASSESSMENT AND PLAN:  This is a 74year old female who presents with :    1. Type 2 diabetes mellitus without complication, without long-term current use of insulin (CMS/[facility])    2. Coronary artery disease involving coronary bypass graft of native heart without angina pectoris    3. Chronic diarrhea    4. Major depressive disorder with single episode, in full remission (CMS/HCC)    5. Contracture of joint of finger, unspecified laterality    6. Essential hypertension, benign    7. H/O: lung cancer    8. Dyslipidemia associated with type 2 diabetes mellitus (CMS/[facility])        Orders Placed This Encounter   â¢ SERVICE TO CARDIOLOGY   â¢ SERVICE TO ORTHOPEDICS   â¢ metoPROLOL succinate (TOPROL-XL) 50 MG 24 hr tablet   â¢ sitaGLIPTIN-metFORMIN (Janumet)  MG per tablet       Plan:     Type 2 diabetes mellitus without complication, without long-term current use of insulin:  Well-controlled. Reviewed and discussed recent labs. Restart Janumet  mg, start with half a tablet for a week, then take full tablet daily. Discussed Glipizide as an option. Recommended to talk to the pharmacistChristiane about the cost of insulin and vouchers or discounts. Coronary artery disease involving coronary bypass graft of native heart without angina pectoris:  History reviewed. Reviewed and discussed previous reports. Continue current management. Referral to Cardiology, Dr. Jaya Alvares provided. Let us know if not contacted in a week. Chronic diarrhea: Will continue to monitor her response to medication. Major depressive disorder with single episode, in full remission (CMS/HCC):  Well-controlled. Continue current regimen of Fluoxetine.      Contracture of joint of finger, unspecified laterality:  Informed patient about possible increase in blood sugar with cortisone injection. Discussed surgical intervention. Referral to Orthopedics, Dr. Debbie Mahajan (hand surgeon) provided for further evaluation and management. Essential hypertension, benign:  Borderline elevated, due to not taking medication today. Continue current regimen of Metoprolol 50 mg    H/O: lung cancer:  Reviewed. Resolved. Dyslipidemia associated with type 2 diabetes mellitus:  Stable. Continue current management. I informed the patient that I cannot treat her (even virtually) while she will be in [place]. Patient has a doctor in [place]. Also, informed her about annual Medicare wellness visit. Refer to orders. Medical compliance with plan discussed and risks of non-compliance reviewed. Patient education completed on disease process, etiology & prognosis. Proper usage and side effects of medications reviewed & discussed. Patient understands and agrees with the plan. Return to clinic as clinically indicated as discussed with patient who verbalized understanding of the plan and is in agreement with the plan. Return in about 3 months (around 9/13/2022) for mwv with fasting lab pta . I,  Dr. Amos Tadeo, have created a visit summary document based on the audio recording between Dr. Anneliese Barraza MD and this patient for the physician to review, edit as needed, and authenticate. Creation Date: 6/13/2022   I have reviewed and edited the visit summary above and attest that it is accurate. minimum assist (75% patients effort)

## 2022-06-16 ENCOUNTER — NON-APPOINTMENT (OUTPATIENT)
Age: 72
End: 2022-06-16

## 2022-06-26 ENCOUNTER — NON-APPOINTMENT (OUTPATIENT)
Age: 72
End: 2022-06-26

## 2022-07-11 ENCOUNTER — APPOINTMENT (OUTPATIENT)
Dept: ENDOCRINOLOGY | Facility: CLINIC | Age: 72
End: 2022-07-11

## 2022-07-21 ENCOUNTER — APPOINTMENT (OUTPATIENT)
Dept: PAIN MANAGEMENT | Facility: CLINIC | Age: 72
End: 2022-07-21

## 2022-07-21 VITALS — BODY MASS INDEX: 36.65 KG/M2 | WEIGHT: 220 LBS | HEIGHT: 65 IN

## 2022-07-21 PROCEDURE — J3490M: CUSTOM

## 2022-07-21 PROCEDURE — 20553 NJX 1/MLT TRIGGER POINTS 3/>: CPT

## 2022-07-21 PROCEDURE — 99214 OFFICE O/P EST MOD 30 MIN: CPT | Mod: 25

## 2022-07-21 NOTE — PROCEDURE
[Trigger point 1-2 muscle groups] : trigger point 1-2 muscle groups [Other: ____] : [unfilled] [Lumbar paraspinal muscle] : lumbar paraspinal muscle [Trapezius muscle] : trapezius muscle [Pain] : pain [Inflammation] : inflammation [Alcohol] : alcohol [Ethyl Chloride sprayed topically] : ethyl chloride sprayed topically [Sterile technique used] : sterile technique used [___ cc    1%] : Lidocaine ~Vcc of 1%  [___ cc    0.25%] : Bupivacaine (Marcaine) ~Vcc of 0.25%  [___ cc    10mg] : Triamcinolone (Kenalog) ~Vcc of 10 mg

## 2022-07-21 NOTE — HISTORY OF PRESENT ILLNESS
[Lower back] : lower back [10] : 10 [Dull/Aching] : dull/aching [Radiating] : radiating [Sharp] : sharp [Constant] : constant [Household chores] : household chores [Leisure] : leisure [Sleep] : sleep [Meds] : meds [Sitting] : sitting [Standing] : standing [Walking] : walking [Stairs] : stairs [] : no [FreeTextEntry7] : right leg [FreeTextEntry9] : tpi

## 2022-08-11 ENCOUNTER — LABORATORY RESULT (OUTPATIENT)
Age: 72
End: 2022-08-11

## 2022-08-11 ENCOUNTER — APPOINTMENT (OUTPATIENT)
Dept: ENDOCRINOLOGY | Facility: CLINIC | Age: 72
End: 2022-08-11

## 2022-08-11 VITALS
DIASTOLIC BLOOD PRESSURE: 64 MMHG | HEART RATE: 83 BPM | WEIGHT: 220 LBS | SYSTOLIC BLOOD PRESSURE: 147 MMHG | HEIGHT: 65 IN | OXYGEN SATURATION: 95 % | BODY MASS INDEX: 36.65 KG/M2

## 2022-08-11 LAB
GLUCOSE BLDC GLUCOMTR-MCNC: 313
HBA1C MFR BLD HPLC: 9.9

## 2022-08-11 PROCEDURE — 36415 COLL VENOUS BLD VENIPUNCTURE: CPT

## 2022-08-11 PROCEDURE — 83036 HEMOGLOBIN GLYCOSYLATED A1C: CPT | Mod: QW

## 2022-08-11 PROCEDURE — 82962 GLUCOSE BLOOD TEST: CPT

## 2022-08-11 PROCEDURE — 99214 OFFICE O/P EST MOD 30 MIN: CPT | Mod: 25

## 2022-08-11 RX ORDER — TRIAMCINOLONE ACETONIDE 1 MG/G
0.1 OINTMENT TOPICAL
Qty: 80 | Refills: 0 | Status: ACTIVE | COMMUNITY
Start: 2022-07-29

## 2022-08-11 RX ORDER — TROSPIUM CHLORIDE 60 MG/1
60 CAPSULE, EXTENDED RELEASE ORAL
Qty: 30 | Refills: 0 | Status: ACTIVE | COMMUNITY
Start: 2022-08-05

## 2022-08-11 RX ORDER — AMPICILLIN 500 MG/1
500 CAPSULE ORAL
Qty: 30 | Refills: 0 | Status: ACTIVE | COMMUNITY
Start: 2022-03-25

## 2022-08-11 NOTE — HISTORY OF PRESENT ILLNESS
[FreeTextEntry1] : Patient is here today for followup visit . She has  a history of thyroid cancer . She denies any hoarseness or upper respiratory infection symptoms.Given her history of thyroid cancer she is nervous about this. A neck sonogram was done and except for some small  benign lymph nodes it was negative in February and stable in July 2020 and June 2021. . . . . \par She is currently on MDI for type 1 diabetes and uses the FreeStyle sensor; due to some episodes in the past of hypoglycemia unawareness and she lives alone, she now has Kristie sensor.. She was trained on the T slim and DexCom but ended up hospitalized for high sugars end of December so no longer comfortable using the pump. She was in rehab after do to difficulty walking. Recently had injections in her knees. She is using 22 u of Toujeo and a formula for mealtime Apidra insulin.   \par Not wearing sensor today. \par

## 2022-08-11 NOTE — PHYSICAL EXAM
[Alert] : alert [Well Nourished] : well nourished [No Acute Distress] : no acute distress [Well Developed] : well developed [Normal Sclera/Conjunctiva] : normal sclera/conjunctiva [EOMI] : extra ocular movement intact [No Proptosis] : no proptosis [Normal Oropharynx] : the oropharynx was normal [Well Healed Scar] : well healed scar [No Respiratory Distress] : no respiratory distress [Clear to Auscultation] : lungs were clear to auscultation bilaterally [Normal PMI] : the apical impulse was normal [Normal S1, S2] : normal S1 and S2 [Normal Rate] : heart rate was normal [Regular Rhythm] : with a regular rhythm [Pedal Pulses Normal] : the pedal pulses are present [Normal Bowel Sounds] : normal bowel sounds [Not Tender] : non-tender [Not Distended] : not distended [Soft] : abdomen soft [Normal Anterior Cervical Nodes] : no anterior cervical lymphadenopathy [No Spinal Tenderness] : no spinal tenderness [Spine Straight] : spine straight [Acanthosis Nigricans] : no acanthosis nigricans [Normal Reflexes] : deep tendon reflexes were 2+ and symmetric [Oriented x3] : oriented to person, place, and time [Normal Insight/Judgement] : insight and judgment were intact [de-identified] : trace pedal edema  [de-identified] : stasis dermatitis lower legs  [de-identified] : stiffness in gait

## 2022-08-11 NOTE — ASSESSMENT
[FreeTextEntry1] : 1)  rhytid cancer history/hypothyroidism  - Patient generally does a neck sonogram yearly for her  history of papillary thyroid cancer. No change in Tirosint dose.TG ab lower, last neck sonogram stable. TSH 6 in march will repeat.  sonogram neck June 2022 stable. . \par 2)  type 1 Diabetes  - will remain off  Tandem pump, she is using the  Kristie sensor  To have HS snack especially when sees it dropping on the sensor. Continue to take 4 injections of insulin daily. Monitors sugar 6-8 times daily.  Carb ratio 15 Target 159 before meals and now 200 after meals , CF 50. Always factor in arrows and if before eating and sugar under 80 have 4 OZ OJ  or equivalent first. Must focus first and avoiding low sugars, high % on prior  sensor. Will see CDE in a few weeks. has agreed to in pen ? . To resume use of sensor. \par Encourage healthy lifestyle including a low carb diet and exercise as tolerated. Monitor blood sugars as directed and bring meter/sensor   to all visits.\par 3) BD done 6/2021 . Lowest site RFN   T score - 2.0 \par f/u CDE in one mos. \par f/u 3 mos  [Diabetes Foot Care] : diabetes foot care [Long Term Vascular Complications] : long term vascular complications of diabetes [Carbohydrate Consistent Diet] : carbohydrate consistent diet [Importance of Diet and Exercise] : importance of diet and exercise to improve glycemic control, achieve weight loss and improve cardiovascular health [Hypoglycemia Management] : hypoglycemia management [Self Monitoring of Blood Glucose] : self monitoring of blood glucose [Retinopathy Screening] : Patient was referred to ophthalmology for retinopathy screening [Diabetic Medications] : Risks and benefits of diabetic medications were discussed [Levothyroxine] : The patient was instructed to take Levothyroxine on an empty stomach, separate from vitamins, and wait at least 30 minutes before eating

## 2022-08-15 ENCOUNTER — NON-APPOINTMENT (OUTPATIENT)
Age: 72
End: 2022-08-15

## 2022-08-16 LAB
T4 FREE SERPL-MCNC: 1.6 NG/DL
T4 SERPL-MCNC: 8.6 UG/DL
TSH SERPL-ACNC: 0.49 UIU/ML

## 2022-08-17 LAB — THYROGLOB AB SERPL IA-ACNC: 7.9 IU/ML

## 2022-09-08 ENCOUNTER — APPOINTMENT (OUTPATIENT)
Dept: PAIN MANAGEMENT | Facility: CLINIC | Age: 72
End: 2022-09-08

## 2022-09-08 VITALS — WEIGHT: 220 LBS | BODY MASS INDEX: 36.65 KG/M2 | HEIGHT: 65 IN

## 2022-09-08 PROCEDURE — 99214 OFFICE O/P EST MOD 30 MIN: CPT | Mod: 25

## 2022-09-08 PROCEDURE — J3490M: CUSTOM

## 2022-09-08 PROCEDURE — 20553 NJX 1/MLT TRIGGER POINTS 3/>: CPT

## 2022-09-08 NOTE — HISTORY OF PRESENT ILLNESS
[Right Leg] : right leg [7] : 7 [Dull/Aching] : dull/aching [] : yes [Constant] : constant [Meds] : meds [Nothing helps with pain getting better] : Nothing helps with pain getting better [Bending forward] : bending forward [Lying in bed] : lying in bed [FreeTextEntry1] : NECK/b/l arms [FreeTextEntry7] : arms

## 2022-09-21 ENCOUNTER — NON-APPOINTMENT (OUTPATIENT)
Age: 72
End: 2022-09-21

## 2022-10-02 ENCOUNTER — NON-APPOINTMENT (OUTPATIENT)
Age: 72
End: 2022-10-02

## 2022-10-09 ENCOUNTER — NON-APPOINTMENT (OUTPATIENT)
Age: 72
End: 2022-10-09

## 2022-10-20 ENCOUNTER — APPOINTMENT (OUTPATIENT)
Dept: PAIN MANAGEMENT | Facility: CLINIC | Age: 72
End: 2022-10-20

## 2022-10-20 VITALS — WEIGHT: 220 LBS | HEIGHT: 65 IN | BODY MASS INDEX: 36.65 KG/M2

## 2022-10-20 PROCEDURE — 99214 OFFICE O/P EST MOD 30 MIN: CPT | Mod: 25

## 2022-10-20 PROCEDURE — J3490M: CUSTOM

## 2022-10-20 PROCEDURE — 20552 NJX 1/MLT TRIGGER POINT 1/2: CPT

## 2022-10-20 NOTE — HISTORY OF PRESENT ILLNESS
[Right Leg] : right leg [Dull/Aching] : dull/aching [] : yes [Constant] : constant [Meds] : meds [Nothing helps with pain getting better] : Nothing helps with pain getting better [Bending forward] : bending forward [Lying in bed] : lying in bed [10] : 10 [Household chores] : household chores [Leisure] : leisure [Sleep] : sleep [FreeTextEntry1] : NECK , b/l arms [FreeTextEntry7] : arms, left leg

## 2022-10-20 NOTE — PHYSICAL EXAM
[Normal Coordination] : normal coordination [Normal DTR UE/LE] : normal DTR UE/LE  [Normal Sensation] : normal sensation [Normal Mood and Affect] : normal mood and affect [Able to Communicate] : able to communicate [Normal Skin] : normal skin [No Rash] : no rash [No Ulcers] : no ulcers [No Lesions] : no lesions [No obvious lymphadenopathy in areas examined] : no obvious lymphadenopathy in areas examined [Well Developed] : well developed [Well Nourished] : well nourished [No Respiratory Distress] : no respiratory distress [] : mildly antalgic [FreeTextEntry9] : Declines [place] [FreeTextEntry8] : Iliotibial band tenderness

## 2022-10-20 NOTE — ASSESSMENT
[FreeTextEntry1] : Pt reports relief, > 70% relief from TPI lasting 6 weeks or more, will repeat today.

## 2022-10-24 ENCOUNTER — APPOINTMENT (OUTPATIENT)
Dept: ENDOCRINOLOGY | Facility: CLINIC | Age: 72
End: 2022-10-24
Payer: MEDICARE

## 2022-10-24 VITALS
HEART RATE: 83 BPM | OXYGEN SATURATION: 96 % | BODY MASS INDEX: 36.65 KG/M2 | HEIGHT: 65 IN | DIASTOLIC BLOOD PRESSURE: 78 MMHG | WEIGHT: 220 LBS | SYSTOLIC BLOOD PRESSURE: 143 MMHG

## 2022-10-24 LAB — GLUCOSE BLDC GLUCOMTR-MCNC: 249

## 2022-10-24 PROCEDURE — G0108 DIAB MANAGE TRN  PER INDIV: CPT

## 2022-10-24 PROCEDURE — 82962 GLUCOSE BLOOD TEST: CPT

## 2022-10-24 PROCEDURE — 95251 CONT GLUC MNTR ANALYSIS I&R: CPT

## 2022-11-02 NOTE — PROGRESS NOTE ADULT - PROBLEM SELECTOR PROBLEM 1
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 Discussed findings of today's exam and possible causes of patient's pain. Educated patient on their most probable diagnosis. Reviewed possible courses of treatment, and we collaboratively decided best course of treatment at this time will include:\par \par 1. Start PT \par 2. Start diclofenac \par 3. Discussed possible arthroscope and debridement if pain persists or worsens \par 4. Activity mod\par 5. May repeat CSI three months from initial CSI\par \par The patient's current medication management of their orthopedic diagnosis was reviewed today: \par \par (1) We discussed a comprehensive treatment plan that included possible pharmaceutical management involving the use of prescription strength medications including but not limited to options such as oral Naprosyn 500mg BID, once daily Meloxicam 15 mg, or 500-650 mg Tylenol versus over the counter oral medications and topical prescription NSAID Pennsaid vs over the counter Voltaren gel. \par \par (2) There is a moderate risk of morbidity with further treatment, especially from use of prescription strength medications and possible side effects of these medications which include upset stomach with oral medications, skin reactions to topical medications and cardiac/renal issues with long term use. \par \par (3) I recommended that the patient follow-up with their medical physician to discuss any significant specific potential issues with long term medication use such as interactions with current medications or with exacerbation of underlying medical comorbidities. \par \par (4) The benefits and risks associated with use of injectable, oral or topical, prescription and over the counter anti-inflammatory medications were discussed with the patient. The patient voiced understanding of the risks including but not limited to bleeding, stroke, kidney dysfunction, heart disease, and were referred to the black box warning label for further information.\par \par Prior to appointment and during encounter with patient extensive medical records were reviewed including but not limited to, hospital records, out patient records, imaging results, and lab data. During this appointment the patient was examined, diagnoses were discussed and explained in a face to face manner. In addition extensive time was spent reviewing aforementioned diagnostic studies. Counseling including abnormal image results, differential diagnoses, treatment options, risk and benefits, lifestyle changes, current condition, and current medications was performed. Patient's comments, questions, and concerns were address and patient verbalized understanding.\par \par Follow up as needed.\par \par History, physical exam, imaging, assessment and plan documented by Av Monaco. The documentation recorded by the scribe accurately reflects the service I, Osman Buckner MD, personally performed and the decisions made by me.  Type 1 diabetes mellitus, uncontrolled

## 2022-11-17 ENCOUNTER — LABORATORY RESULT (OUTPATIENT)
Age: 72
End: 2022-11-17

## 2022-11-17 ENCOUNTER — APPOINTMENT (OUTPATIENT)
Dept: ENDOCRINOLOGY | Facility: CLINIC | Age: 72
End: 2022-11-17

## 2022-11-17 VITALS
DIASTOLIC BLOOD PRESSURE: 73 MMHG | SYSTOLIC BLOOD PRESSURE: 140 MMHG | OXYGEN SATURATION: 96 % | BODY MASS INDEX: 36.65 KG/M2 | HEART RATE: 79 BPM | WEIGHT: 220 LBS | HEIGHT: 65 IN

## 2022-11-17 LAB
GLUCOSE BLDC GLUCOMTR-MCNC: 225
HBA1C MFR BLD HPLC: 8.1

## 2022-11-17 PROCEDURE — 99214 OFFICE O/P EST MOD 30 MIN: CPT | Mod: 25

## 2022-11-17 PROCEDURE — 36415 COLL VENOUS BLD VENIPUNCTURE: CPT

## 2022-11-17 PROCEDURE — 83036 HEMOGLOBIN GLYCOSYLATED A1C: CPT | Mod: QW

## 2022-11-17 PROCEDURE — 82962 GLUCOSE BLOOD TEST: CPT

## 2022-11-17 NOTE — ASSESSMENT
[Diabetes Foot Care] : diabetes foot care [Long Term Vascular Complications] : long term vascular complications of diabetes [Carbohydrate Consistent Diet] : carbohydrate consistent diet [Importance of Diet and Exercise] : importance of diet and exercise to improve glycemic control, achieve weight loss and improve cardiovascular health [Self Monitoring of Blood Glucose] : self monitoring of blood glucose [Diabetic Medications] : Risks and benefits of diabetic medications were discussed [FreeTextEntry1] : 1)  rhytid cancer history/hypothyroidism  - Patient generally does a neck sonogram yearly for her  history of papillary thyroid cancer. No change in Tirosint dose.TG ab lower, last neck sonogram stable. TSH 6 in march will repeat.  sonogram neck June 2022 stable. . \par 2)  type 1 Diabetes  - will remain off  Tandem pump, she is using the  Kristie sensor  To have HS snack especially when sees it dropping on the sensor. Continue to take 4 injections of insulin daily. Monitors sugar 6-8 times daily.  Carb ratio 15 Target 159 before meals and now 200 after meals , CF 50. Always factor in arrows and if before eating and sugar under 80 have 4 OZ OJ  or equivalent first. Must focus first and avoiding low sugars, high % on prior  sensor. Will see CDE in a few weeks. has agreed to in pen ? . To resume use of sensor. \par Encourage healthy lifestyle including a low carb diet and exercise as tolerated. Monitor blood sugars as directed and bring meter/sensor   to all visits.\par 3) BD done 6/2021 . Lowest site RFN   T score - 2.0 \par f/u CDE in one mos. \par f/u 3 mos

## 2022-11-17 NOTE — HISTORY OF PRESENT ILLNESS
[FreeTextEntry1] : Patient is here today for followup visit . She has  a history of thyroid cancer . She denies any hoarseness or upper respiratory infection symptoms.Given her history of thyroid cancer she is nervous about this. A neck sonogram was done and except for some small  benign lymph nodes it was negative in February and stable in July 2020 and June 2021. . . . . \par She is currently on MDI for type 1 diabetes and uses the FreeStyle sensor; due to some episodes in the past of hypoglycemia unawareness and she lives alone, she now has Kristie sensor.. She was trained on the T slim and DexCom but ended up hospitalized for high sugars end of December so no longer comfortable using the pump. She was in rehab after do to difficulty walking. Recently had injections in her knees. She is using 22 u of Toujeo and a formula for mealtime Apidra insulin.   \par Not wearing sensor today. needed to remove for scans.  \par Recently had ear infection was given steroids and ABs  and UTI\par Recently had pancreatic MRI.

## 2022-11-17 NOTE — PHYSICAL EXAM
[Alert] : alert [Well Nourished] : well nourished [No Acute Distress] : no acute distress [Well Developed] : well developed [Normal Sclera/Conjunctiva] : normal sclera/conjunctiva [EOMI] : extra ocular movement intact [No Proptosis] : no proptosis [Normal Oropharynx] : the oropharynx was normal [Well Healed Scar] : well healed scar [No Respiratory Distress] : no respiratory distress [Clear to Auscultation] : lungs were clear to auscultation bilaterally [Normal PMI] : the apical impulse was normal [Normal S1, S2] : normal S1 and S2 [Normal Rate] : heart rate was normal [Regular Rhythm] : with a regular rhythm [Pedal Pulses Normal] : the pedal pulses are present [Normal Bowel Sounds] : normal bowel sounds [Not Tender] : non-tender [Not Distended] : not distended [Soft] : abdomen soft [Normal Anterior Cervical Nodes] : no anterior cervical lymphadenopathy [No Spinal Tenderness] : no spinal tenderness [Spine Straight] : spine straight [Normal Reflexes] : deep tendon reflexes were 2+ and symmetric [Oriented x3] : oriented to person, place, and time [Normal Insight/Judgement] : insight and judgment were intact [Acanthosis Nigricans] : no acanthosis nigricans [de-identified] : trace pedal edema  [de-identified] : stasis dermatitis lower legs  [de-identified] : stiffness in gait

## 2022-11-22 LAB
25(OH)D3 SERPL-MCNC: 40.3 NG/ML
ALBUMIN SERPL ELPH-MCNC: 4 G/DL
ALP BLD-CCNC: 100 U/L
ALT SERPL-CCNC: 10 U/L
ANION GAP SERPL CALC-SCNC: 11 MMOL/L
APPEARANCE: ABNORMAL
AST SERPL-CCNC: 13 U/L
BASOPHILS # BLD AUTO: 0.24 K/UL
BASOPHILS NFR BLD AUTO: 1.7 %
BILIRUB SERPL-MCNC: 0.8 MG/DL
BILIRUBIN URINE: NEGATIVE
BLOOD URINE: NEGATIVE
BUN SERPL-MCNC: 27 MG/DL
CALCIUM SERPL-MCNC: 9.8 MG/DL
CHLORIDE SERPL-SCNC: 104 MMOL/L
CHOLEST SERPL-MCNC: 148 MG/DL
CO2 SERPL-SCNC: 24 MMOL/L
COLOR: YELLOW
CREAT SERPL-MCNC: 0.89 MG/DL
CREAT SPEC-SCNC: 198 MG/DL
EGFR: 68 ML/MIN/1.73M2
EOSINOPHIL # BLD AUTO: 0.27 K/UL
EOSINOPHIL NFR BLD AUTO: 1.9 %
GLUCOSE QUALITATIVE U: NEGATIVE
GLUCOSE SERPL-MCNC: 155 MG/DL
HCT VFR BLD CALC: 40.3 %
HDLC SERPL-MCNC: 66 MG/DL
HGB BLD-MCNC: 12.6 G/DL
IMM GRANULOCYTES NFR BLD AUTO: 1.2 %
KETONES URINE: NEGATIVE
LDLC SERPL CALC-MCNC: 64 MG/DL
LEUKOCYTE ESTERASE URINE: NEGATIVE
LYMPHOCYTES # BLD AUTO: 3.04 K/UL
LYMPHOCYTES NFR BLD AUTO: 21.9 %
MAN DIFF?: NORMAL
MCHC RBC-ENTMCNC: 28.7 PG
MCHC RBC-ENTMCNC: 31.3 GM/DL
MCV RBC AUTO: 91.8 FL
MICROALBUMIN 24H UR DL<=1MG/L-MCNC: 1.2 MG/DL
MICROALBUMIN/CREAT 24H UR-RTO: 6 MG/G
MONOCYTES # BLD AUTO: 0.84 K/UL
MONOCYTES NFR BLD AUTO: 6 %
NEUTROPHILS # BLD AUTO: 9.35 K/UL
NEUTROPHILS NFR BLD AUTO: 67.3 %
NITRITE URINE: NEGATIVE
NONHDLC SERPL-MCNC: 82 MG/DL
PH URINE: 5.5
PLATELET # BLD AUTO: 358 K/UL
POTASSIUM SERPL-SCNC: 4.6 MMOL/L
PROT SERPL-MCNC: 6.4 G/DL
PROTEIN URINE: NEGATIVE
RBC # BLD: 4.39 M/UL
RBC # FLD: 15.4 %
SODIUM SERPL-SCNC: 139 MMOL/L
SPECIFIC GRAVITY URINE: >=1.03
T4 FREE SERPL-MCNC: 1.5 NG/DL
T4 SERPL-MCNC: 8.7 UG/DL
TRIGL SERPL-MCNC: 87 MG/DL
TSH SERPL-ACNC: 3.65 UIU/ML
UROBILINOGEN URINE: NORMAL
WBC # FLD AUTO: 13.91 K/UL

## 2022-11-28 LAB — THYROGLOB AB SERPL IA-ACNC: 6.2 IU/ML

## 2022-12-01 ENCOUNTER — NON-APPOINTMENT (OUTPATIENT)
Age: 72
End: 2022-12-01

## 2022-12-01 ENCOUNTER — APPOINTMENT (OUTPATIENT)
Dept: PAIN MANAGEMENT | Facility: CLINIC | Age: 72
End: 2022-12-01

## 2022-12-01 VITALS — HEIGHT: 65 IN | WEIGHT: 226 LBS | BODY MASS INDEX: 37.65 KG/M2

## 2022-12-01 DIAGNOSIS — M79.10 MYALGIA, UNSPECIFIED SITE: ICD-10-CM

## 2022-12-01 PROCEDURE — J3490N: CUSTOM

## 2022-12-01 PROCEDURE — 99214 OFFICE O/P EST MOD 30 MIN: CPT | Mod: 25

## 2022-12-01 PROCEDURE — 20553 NJX 1/MLT TRIGGER POINTS 3/>: CPT

## 2022-12-01 NOTE — HISTORY OF PRESENT ILLNESS
[Neck] : neck [Lower back] : lower back [Dull/Aching] : dull/aching [] : yes [Frequent] : frequent [Household chores] : household chores [Social interactions] : social interactions [Injection therapy] : injection therapy [Walking] : walking [Bending forward] : bending forward [Exercising] : exercising [Stairs] : stairs

## 2022-12-01 NOTE — PROCEDURE
[Trigger point 1-2 muscle groups] : trigger point 1-2 muscle groups [Bilateral] : bilaterally of the [Other: ____] : [unfilled] [Lumbar paraspinal muscle] : lumbar paraspinal muscle [Trapezius muscle] : trapezius muscle [Pain] : pain [Inflammation] : inflammation [Alcohol] : alcohol [Ethyl Chloride sprayed topically] : ethyl chloride sprayed topically [Sterile technique used] : sterile technique used [___ cc    1%] : Lidocaine ~Vcc of 1%  [___ cc    0.25%] : Bupivacaine (Marcaine) ~Vcc of 0.25%  [___ cc    10mg] : Triamcinolone (Kenalog) ~Vcc of 10 mg

## 2022-12-05 ENCOUNTER — NON-APPOINTMENT (OUTPATIENT)
Age: 72
End: 2022-12-05

## 2022-12-06 ENCOUNTER — NON-APPOINTMENT (OUTPATIENT)
Age: 72
End: 2022-12-06

## 2022-12-18 ENCOUNTER — NON-APPOINTMENT (OUTPATIENT)
Age: 72
End: 2022-12-18

## 2022-12-19 ENCOUNTER — RX RENEWAL (OUTPATIENT)
Age: 72
End: 2022-12-19

## 2023-01-09 ENCOUNTER — APPOINTMENT (OUTPATIENT)
Dept: ENDOCRINOLOGY | Facility: CLINIC | Age: 73
End: 2023-01-09

## 2023-01-10 NOTE — DISCHARGE NOTE NURSING/CASE MANAGEMENT/SOCIAL WORK - MODE OF TRANSPORTATION
Ambulance [facility] Pregnancy And Lactation Text: It is unknown if this medication can harm a developing fetus or if it is excreted in breast milk.

## 2023-02-03 ENCOUNTER — APPOINTMENT (OUTPATIENT)
Dept: PAIN MANAGEMENT | Facility: CLINIC | Age: 73
End: 2023-02-03
Payer: MEDICARE

## 2023-02-03 VITALS — BODY MASS INDEX: 37.82 KG/M2 | HEIGHT: 65 IN | WEIGHT: 227 LBS

## 2023-02-03 DIAGNOSIS — M54.17 RADICULOPATHY, LUMBOSACRAL REGION: ICD-10-CM

## 2023-02-03 PROCEDURE — J3490N: CUSTOM | Mod: NC

## 2023-02-03 PROCEDURE — 20553 NJX 1/MLT TRIGGER POINTS 3/>: CPT

## 2023-02-03 PROCEDURE — 99214 OFFICE O/P EST MOD 30 MIN: CPT | Mod: 25

## 2023-02-03 NOTE — PROCEDURE
[Trigger point 1-2 muscle groups] : trigger point 1-2 muscle groups [Bilateral] : bilaterally of the [Other: ____] : [unfilled] [Lumbar paraspinal muscle] : lumbar paraspinal muscle [Cervical paraspinal muscle] : cervical paraspinal muscle [Trapezius muscle] : trapezius muscle [Pain] : pain [Inflammation] : inflammation [Alcohol] : alcohol [Ethyl Chloride sprayed topically] : ethyl chloride sprayed topically [Sterile technique used] : sterile technique used [___ cc    1%] : Lidocaine ~Vcc of 1%  [___ cc    0.25%] : Bupivacaine (Marcaine) ~Vcc of 0.25%  [___ cc    10mg] : Triamcinolone (Kenalog) ~Vcc of 10 mg

## 2023-02-03 NOTE — DISCUSSION/SUMMARY
[Surgical risks reviewed] : Surgical risks reviewed [de-identified] : After discussing various treatment options with the patient including but not limited to oral medications, physical therapy, exercise,\par modalities as well as interventional spinal injections, we have decided with the following plan\par \par I personally reviewed the MRI/CT scan images and agree with the radiologist's report. The\par radiological findings were discussed with the patient.\par \par \par The risks, benefits, contents and alternatives to injection were explained in full to the patient. Risks outlined include but are not\par limited to infection,sepsis, bleeding, post-dural puncture headache, nerve damage, temporary increase in pain, syncopal episode,\par failure to resolve symptoms, allergic reaction, symptom recurrence, and elevation of blood sugar in diabetics. Cortisone may cause\par immunosuppression. Patient understands the risks. All questions were answered. After discussion of options, patient requested\par an injection. Information regarding the injection was given to the patient. Which medications to stop prior to the injection was\par explained to the patient as well.\par \par Follow up in 1-2 weeks post injection for re-evaluation.\par \par  Conservative Care\par Continue Home exercises, stretching, activity modification, physical therapy, and conservative care.\par \par Proceed [place] C7-T1 Pt declines- HOLD

## 2023-02-03 NOTE — ASSESSMENT
[FreeTextEntry1] : MRI reviewed with patient. R/B/A discussed with patient she declines injection ( [place]LE) at present.

## 2023-02-03 NOTE — HISTORY OF PRESENT ILLNESS
[Lower back] : lower back [Neck] : neck [Dull/Aching] : dull/aching [] : yes [Frequent] : frequent [Household chores] : household chores [Social interactions] : social interactions [Injection therapy] : injection therapy [Walking] : walking [Bending forward] : bending forward [Exercising] : exercising [Stairs] : stairs

## 2023-02-03 NOTE — DATA REVIEWED
[MRI] : MRI [Cervical Spine] : cervical spine [Report was reviewed and noted in the chart] : The report was reviewed and noted in the chart [FreeTextEntry1] : Advancing pathology at C4-C5

## 2023-02-16 ENCOUNTER — RX RENEWAL (OUTPATIENT)
Age: 73
End: 2023-02-16

## 2023-03-13 ENCOUNTER — LABORATORY RESULT (OUTPATIENT)
Age: 73
End: 2023-03-13

## 2023-03-13 ENCOUNTER — APPOINTMENT (OUTPATIENT)
Dept: ENDOCRINOLOGY | Facility: CLINIC | Age: 73
End: 2023-03-13
Payer: MEDICARE

## 2023-03-13 VITALS
DIASTOLIC BLOOD PRESSURE: 68 MMHG | WEIGHT: 200 LBS | HEART RATE: 66 BPM | HEIGHT: 65 IN | OXYGEN SATURATION: 97 % | BODY MASS INDEX: 33.32 KG/M2 | SYSTOLIC BLOOD PRESSURE: 131 MMHG

## 2023-03-13 DIAGNOSIS — E55.9 VITAMIN D DEFICIENCY, UNSPECIFIED: ICD-10-CM

## 2023-03-13 LAB
GLUCOSE BLDC GLUCOMTR-MCNC: 191
HBA1C MFR BLD HPLC: 8.2

## 2023-03-13 PROCEDURE — 99214 OFFICE O/P EST MOD 30 MIN: CPT | Mod: 25

## 2023-03-13 PROCEDURE — 82962 GLUCOSE BLOOD TEST: CPT

## 2023-03-13 PROCEDURE — 36415 COLL VENOUS BLD VENIPUNCTURE: CPT

## 2023-03-13 PROCEDURE — 83036 HEMOGLOBIN GLYCOSYLATED A1C: CPT | Mod: QW

## 2023-03-13 PROCEDURE — 95251 CONT GLUC MNTR ANALYSIS I&R: CPT

## 2023-03-13 RX ORDER — CIPROFLOXACIN HYDROCHLORIDE 500 MG/1
500 TABLET, FILM COATED ORAL
Qty: 14 | Refills: 0 | Status: ACTIVE | COMMUNITY
Start: 2023-03-03

## 2023-03-13 RX ORDER — ESCITALOPRAM OXALATE 5 MG/1
5 TABLET ORAL
Qty: 270 | Refills: 0 | Status: ACTIVE | COMMUNITY
Start: 2023-03-02

## 2023-03-13 RX ORDER — BLOOD-GLUCOSE SENSOR
EACH MISCELLANEOUS
Qty: 3 | Refills: 4 | Status: COMPLETED | COMMUNITY
Start: 2020-10-01 | End: 2023-03-13

## 2023-03-13 RX ORDER — PANCRELIPASE 36000; 180000; 114000 [USP'U]/1; [USP'U]/1; [USP'U]/1
36000-114000 CAPSULE, DELAYED RELEASE PELLETS ORAL
Qty: 2 | Refills: 0 | Status: COMPLETED | COMMUNITY
Start: 2021-08-02 | End: 2023-03-13

## 2023-03-13 RX ORDER — BLOOD-GLUCOSE TRANSMITTER
EACH MISCELLANEOUS
Qty: 1 | Refills: 3 | Status: COMPLETED | COMMUNITY
Start: 2020-10-01 | End: 2023-03-13

## 2023-03-13 NOTE — PHYSICAL EXAM
[Alert] : alert [Well Nourished] : well nourished [No Acute Distress] : no acute distress [Well Developed] : well developed [Normal Sclera/Conjunctiva] : normal sclera/conjunctiva [EOMI] : extra ocular movement intact [No Proptosis] : no proptosis [Normal Oropharynx] : the oropharynx was normal [Well Healed Scar] : well healed scar [No Respiratory Distress] : no respiratory distress [Clear to Auscultation] : lungs were clear to auscultation bilaterally [Normal PMI] : the apical impulse was normal [Normal S1, S2] : normal S1 and S2 [Normal Rate] : heart rate was normal [Regular Rhythm] : with a regular rhythm [Pedal Pulses Normal] : the pedal pulses are present [Normal Bowel Sounds] : normal bowel sounds [Not Tender] : non-tender [Not Distended] : not distended [Soft] : abdomen soft [Normal Anterior Cervical Nodes] : no anterior cervical lymphadenopathy [No Spinal Tenderness] : no spinal tenderness [Spine Straight] : spine straight [Acanthosis Nigricans] : no acanthosis nigricans [Normal Reflexes] : deep tendon reflexes were 2+ and symmetric [Oriented x3] : oriented to person, place, and time [Normal Insight/Judgement] : insight and judgment were intact [de-identified] : trace pedal edema  [de-identified] : stasis dermatitis lower legs , multiple ecchymoses on arms with some bandaged  [de-identified] : stiffness in gait

## 2023-03-13 NOTE — REASON FOR VISIT
[Follow - Up] : a follow-up visit [DM Type 1] : DM Type 1 [Hypothyroidism] : hypothyroidism [Weight Management/Obesity] : weight management/obesity [Thyroid Cancer] : thyroid cancer

## 2023-03-13 NOTE — ASSESSMENT
[Diabetes Foot Care] : diabetes foot care [Long Term Vascular Complications] : long term vascular complications of diabetes [Carbohydrate Consistent Diet] : carbohydrate consistent diet [Importance of Diet and Exercise] : importance of diet and exercise to improve glycemic control, achieve weight loss and improve cardiovascular health [Hypoglycemia Management] : hypoglycemia management [Self Monitoring of Blood Glucose] : self monitoring of blood glucose [Retinopathy Screening] : Patient was referred to ophthalmology for retinopathy screening [Levothyroxine] : The patient was instructed to take Levothyroxine on an empty stomach, separate from vitamins, and wait at least 30 minutes before eating [FreeTextEntry1] : 1)  rhytid cancer history/hypothyroidism  - Patient generally does a neck sonogram yearly for her  history of papillary thyroid cancer. No change in Tirosint dose.TG ab lower, last neck sonogram stable. TSH 6 in march will repeat.  sonogram neck June 2022 stable. .to do this June.  \par 2)  type 1 Diabetes  - will remain off  Tandem pump, she is using the  Kristie sensor  To have HS snack especially when sees it dropping on the sensor. Continue to take 4 injections of insulin daily. Monitors sugar 6-8 times daily.  Carb ratio 15 Target 159 before meals and now 200 after meals , CF 50. Always factor in arrows and if before eating and sugar under 80 have 4 OZ OJ  or equivalent first. Must focus first and avoiding low sugars, high % on prior  sensor. Will see CDE in a few weeks. has agreed to in pen ? . Upgrade to kristie 3. when sees CDE. Will decrease Toujeo to 18 u since often has low am readings and then rebounds high. . \par Encourage healthy lifestyle including a low carb diet and exercise as tolerated. Monitor blood sugars as directed and bring meter/sensor   to all visits.\par 3) BD done 6/2021 . Lowest site RFN   T score - 2.0 To do this June. \par f/u CDE in one mos. \par f/u 3 mos \par Recently had UTI will check today.

## 2023-03-14 LAB
25(OH)D3 SERPL-MCNC: 37.4 NG/ML
ALBUMIN SERPL ELPH-MCNC: 3.9 G/DL
ALP BLD-CCNC: 104 U/L
ALT SERPL-CCNC: 11 U/L
ANION GAP SERPL CALC-SCNC: 10 MMOL/L
APPEARANCE: ABNORMAL
AST SERPL-CCNC: 30 U/L
BASOPHILS # BLD AUTO: 0.12 K/UL
BASOPHILS NFR BLD AUTO: 1.2 %
BILIRUB SERPL-MCNC: 0.7 MG/DL
BILIRUBIN URINE: NEGATIVE
BLOOD URINE: NEGATIVE
BUN SERPL-MCNC: 24 MG/DL
CALCIUM SERPL-MCNC: 10 MG/DL
CHLORIDE SERPL-SCNC: 105 MMOL/L
CHOLEST SERPL-MCNC: 139 MG/DL
CO2 SERPL-SCNC: 26 MMOL/L
COLOR: NORMAL
CREAT SERPL-MCNC: 0.73 MG/DL
CREAT SPEC-SCNC: 128 MG/DL
EGFR: 86 ML/MIN/1.73M2
EOSINOPHIL # BLD AUTO: 0.21 K/UL
EOSINOPHIL NFR BLD AUTO: 2.2 %
GLUCOSE QUALITATIVE U: ABNORMAL
GLUCOSE SERPL-MCNC: 242 MG/DL
HCT VFR BLD CALC: 38.5 %
HDLC SERPL-MCNC: 52 MG/DL
HGB BLD-MCNC: 11.9 G/DL
IMM GRANULOCYTES NFR BLD AUTO: 0.4 %
KETONES URINE: NEGATIVE
LDLC SERPL CALC-MCNC: 60 MG/DL
LEUKOCYTE ESTERASE URINE: NEGATIVE
LYMPHOCYTES # BLD AUTO: 1.78 K/UL
LYMPHOCYTES NFR BLD AUTO: 18.4 %
MAN DIFF?: NORMAL
MCHC RBC-ENTMCNC: 27.7 PG
MCHC RBC-ENTMCNC: 30.9 GM/DL
MCV RBC AUTO: 89.5 FL
MICROALBUMIN 24H UR DL<=1MG/L-MCNC: <1.2 MG/DL
MICROALBUMIN/CREAT 24H UR-RTO: NORMAL MG/G
MONOCYTES # BLD AUTO: 0.63 K/UL
MONOCYTES NFR BLD AUTO: 6.5 %
NEUTROPHILS # BLD AUTO: 6.91 K/UL
NEUTROPHILS NFR BLD AUTO: 71.3 %
NITRITE URINE: NEGATIVE
NONHDLC SERPL-MCNC: 86 MG/DL
PH URINE: 6
PLATELET # BLD AUTO: 249 K/UL
POTASSIUM SERPL-SCNC: 5.1 MMOL/L
PROT SERPL-MCNC: 6.1 G/DL
PROTEIN URINE: NEGATIVE
RBC # BLD: 4.3 M/UL
RBC # FLD: 15.7 %
SODIUM SERPL-SCNC: 141 MMOL/L
SPECIFIC GRAVITY URINE: >=1.03
T4 FREE SERPL-MCNC: 2 NG/DL
T4 SERPL-MCNC: 10.5 UG/DL
TRIGL SERPL-MCNC: 131 MG/DL
TSH SERPL-ACNC: 0.15 UIU/ML
UROBILINOGEN URINE: NORMAL
WBC # FLD AUTO: 9.69 K/UL

## 2023-03-15 ENCOUNTER — NON-APPOINTMENT (OUTPATIENT)
Age: 73
End: 2023-03-15

## 2023-03-17 ENCOUNTER — APPOINTMENT (OUTPATIENT)
Dept: PAIN MANAGEMENT | Facility: CLINIC | Age: 73
End: 2023-03-17

## 2023-03-20 ENCOUNTER — NON-APPOINTMENT (OUTPATIENT)
Age: 73
End: 2023-03-20

## 2023-03-21 LAB — THYROGLOB AB SERPL IA-ACNC: 6 IU/ML

## 2023-03-22 ENCOUNTER — NON-APPOINTMENT (OUTPATIENT)
Age: 73
End: 2023-03-22

## 2023-04-19 ENCOUNTER — APPOINTMENT (OUTPATIENT)
Dept: ENDOCRINOLOGY | Facility: CLINIC | Age: 73
End: 2023-04-19

## 2023-05-15 ENCOUNTER — RX RENEWAL (OUTPATIENT)
Age: 73
End: 2023-05-15

## 2023-06-07 ENCOUNTER — APPOINTMENT (OUTPATIENT)
Dept: PAIN MANAGEMENT | Facility: CLINIC | Age: 73
End: 2023-06-07
Payer: MEDICARE

## 2023-06-07 VITALS — WEIGHT: 200 LBS | HEIGHT: 65 IN | BODY MASS INDEX: 33.32 KG/M2

## 2023-06-07 DIAGNOSIS — M79.18 MYALGIA, OTHER SITE: ICD-10-CM

## 2023-06-07 PROCEDURE — 99214 OFFICE O/P EST MOD 30 MIN: CPT | Mod: 25

## 2023-06-07 PROCEDURE — 20552 NJX 1/MLT TRIGGER POINT 1/2: CPT

## 2023-06-07 PROCEDURE — J3490M: CUSTOM | Mod: NC

## 2023-06-07 NOTE — HISTORY OF PRESENT ILLNESS
[Neck] : neck [Lower back] : lower back [Dull/Aching] : dull/aching [Frequent] : frequent [Household chores] : household chores [Social interactions] : social interactions [Injection therapy] : injection therapy [Walking] : walking [Bending forward] : bending forward [Exercising] : exercising [Stairs] : stairs [10] : 10 [Radiating] : radiating [FreeTextEntry1] : pt is following up for neck and lower back pain , pt will continue care under Dr. Lopez \par pt was a Dr. Walden patient  [] : no

## 2023-06-07 NOTE — PHYSICAL EXAM

## 2023-06-07 NOTE — DISCUSSION/SUMMARY
[de-identified] : Patient is presenting with acute/sub-acute radicular pain with impairment in ADLs and functionality.  The pain has not responded to  conservative care including nsaid therapy and/or physical therapy.  There is no bleeding tendency, unstable medical condition, or systemic infection. The proposed procedure corresponds clinically to the dermatomal pattern of the affected nerve/nerves.\par \par I personally reviewed the MRI/CT scan images and agree with the radiologist's report. The radiological findings were discussed with the patient\par \par proceed with contreras

## 2023-06-07 NOTE — PROCEDURE
[Trigger point 1-2 muscle groups] : trigger point 1-2 muscle groups [Right] : of the right [Other: ____] : [unfilled] [Pain] : pain [Inflammation] : inflammation [Alcohol] : alcohol [Ethyl Chloride sprayed topically] : ethyl chloride sprayed topically [Sterile technique used] : sterile technique used [___ cc    1%] : Lidocaine ~Vcc of 1%  [___ cc    0.25%] : Bupivacaine (Marcaine) ~Vcc of 0.25%  [___ cc    10mg] : Triamcinolone (Kenalog) ~Vcc of 10 mg  [] : Patient tolerated procedure well [Call if redness, pain or fever occur] : call if redness, pain or fever occur [Risks, benefits, alternatives discussed / Verbal consent obtained] : the risks benefits, and alternatives have been discussed, and verbal consent was obtained

## 2023-06-12 ENCOUNTER — APPOINTMENT (OUTPATIENT)
Dept: ENDOCRINOLOGY | Facility: CLINIC | Age: 73
End: 2023-06-12
Payer: MEDICARE

## 2023-06-12 VITALS
OXYGEN SATURATION: 98 % | HEIGHT: 65 IN | HEART RATE: 78 BPM | WEIGHT: 205 LBS | BODY MASS INDEX: 34.16 KG/M2 | SYSTOLIC BLOOD PRESSURE: 128 MMHG | DIASTOLIC BLOOD PRESSURE: 77 MMHG

## 2023-06-12 LAB — GLUCOSE BLDC GLUCOMTR-MCNC: 284

## 2023-06-12 PROCEDURE — 82962 GLUCOSE BLOOD TEST: CPT

## 2023-06-12 PROCEDURE — 99214 OFFICE O/P EST MOD 30 MIN: CPT | Mod: 25

## 2023-06-12 NOTE — ASSESSMENT
[Diabetes Foot Care] : diabetes foot care [Long Term Vascular Complications] : long term vascular complications of diabetes [Carbohydrate Consistent Diet] : carbohydrate consistent diet [Importance of Diet and Exercise] : importance of diet and exercise to improve glycemic control, achieve weight loss and improve cardiovascular health [Hypoglycemia Management] : hypoglycemia management [Self Monitoring of Blood Glucose] : self monitoring of blood glucose [Retinopathy Screening] : Patient was referred to ophthalmology for retinopathy screening [Diabetic Medications] : Risks and benefits of diabetic medications were discussed [Levothyroxine] : The patient was instructed to take Levothyroxine on an empty stomach, separate from vitamins, and wait at least 30 minutes before eating [FreeTextEntry1] : 1)  rhytid cancer history/hypothyroidism  - Patient generally does a neck sonogram yearly for her  history of papillary thyroid cancer. No change in Tirosint dose.TG ab lower, last neck sonogram stable. to repeat now. \par 2)  type 1 Diabetes  - will remain off  Tandem pump, she is using the  Kristie sensor  To have HS snack especially when sees it dropping on the sensor. Continue to take 4 injections of insulin daily. Monitors sugar 6-8 times daily.  Carb ratio 15 Target 159 before meals and now 200 after meals , CF 50. Always factor in arrows and if before eating and sugar under 80 have 4 OZ OJ  or equivalent first. Must focus first and avoiding low sugars, high % on prior  sensor. Will see CDE in a few weeks. has agreed to in pen ? . Upgrade to kristie 3. when sees CDE. Not wearing sensor today. \par Encourage healthy lifestyle including a low carb diet and exercise as tolerated. Monitor blood sugars as directed and bring meter/sensor   to all visits.\par 3) BD done 6/2021 . Lowest site RFN   T score - 2.0 To do this June. \par f/u CDE in one mos. \par f/u 3 mos \par

## 2023-06-12 NOTE — HISTORY OF PRESENT ILLNESS
[FreeTextEntry1] : Patient is here today for followup visit . She has  a history of thyroid cancer . She denies any hoarseness or upper respiratory infection symptoms.Given her history of thyroid cancer she is nervous about this. A neck sonogram was done and except for some small  benign lymph nodes it was negative in February and stable in July 2022.. \par She is currently on MDI for type 1 diabetes and uses the FreeStyle sensor; due to some episodes in the past of hypoglycemia unawareness and she lives alone, she now has Kristie sensor.. She was trained on the T slim and DexCom but ended up hospitalized for high sugars end of December so no longer comfortable using the pump. She was in rehab after do to difficulty walking. \par  She is using 22 u of Toujeo and a formula for mealtime Apidra insulin.   \par Not wearing sensor today.   \par

## 2023-06-12 NOTE — PHYSICAL EXAM
[Alert] : alert [Well Nourished] : well nourished [No Acute Distress] : no acute distress [Well Developed] : well developed [Normal Sclera/Conjunctiva] : normal sclera/conjunctiva [EOMI] : extra ocular movement intact [No Proptosis] : no proptosis [Normal Oropharynx] : the oropharynx was normal [Well Healed Scar] : well healed scar [No Respiratory Distress] : no respiratory distress [Clear to Auscultation] : lungs were clear to auscultation bilaterally [Normal PMI] : the apical impulse was normal [Normal S1, S2] : normal S1 and S2 [Normal Rate] : heart rate was normal [Regular Rhythm] : with a regular rhythm [Pedal Pulses Normal] : the pedal pulses are present [Normal Bowel Sounds] : normal bowel sounds [Not Tender] : non-tender [Not Distended] : not distended [Soft] : abdomen soft [Normal Anterior Cervical Nodes] : no anterior cervical lymphadenopathy [No Spinal Tenderness] : no spinal tenderness [Spine Straight] : spine straight [Acanthosis Nigricans] : no acanthosis nigricans [Normal Reflexes] : deep tendon reflexes were 2+ and symmetric [Oriented x3] : oriented to person, place, and time [Normal Insight/Judgement] : insight and judgment were intact [de-identified] : trace pedal edema  [de-identified] : stasis dermatitis lower legs , multiple ecchymoses on arms with some bandaged  [de-identified] : stiffness in gait

## 2023-07-06 ENCOUNTER — APPOINTMENT (OUTPATIENT)
Dept: PAIN MANAGEMENT | Facility: CLINIC | Age: 73
End: 2023-07-06

## 2023-07-10 ENCOUNTER — APPOINTMENT (OUTPATIENT)
Dept: PAIN MANAGEMENT | Facility: CLINIC | Age: 73
End: 2023-07-10
Payer: MEDICARE

## 2023-07-10 ENCOUNTER — APPOINTMENT (OUTPATIENT)
Dept: PAIN MANAGEMENT | Facility: CLINIC | Age: 73
End: 2023-07-10

## 2023-07-10 PROCEDURE — 62321 NJX INTERLAMINAR CRV/THRC: CPT

## 2023-07-10 PROCEDURE — 82948 REAGENT STRIP/BLOOD GLUCOSE: CPT

## 2023-07-10 NOTE — PROCEDURE
[FreeTextEntry3] : Date of Service: 07/10/2023 \par \par Account: [id number]\par \par Patient: BELEN GARVIN \par \par YOB: 1950\par \par Age: 72 year\par \par \par Surgeon: Robles Lopez M.D.\par \par Pre-Operative Diagnosis: Cervical Radiculopathy\par \par Post Operative Diagnosis: Cervical Radiculopathy\par \par Procedure: Interlaminar cervical epidural steroid injection (C7-T1) under fluoroscopic guidance \par \par Anesthesia: MAC\par \par \par This procedure was carried out using fluoroscopic guidance.  The risks and benefits of the procedure were discussed extensively with the patient.  The consent of the patient was obtained and the following procedure was performed.\par \par The patient was placed in the prone position using a thoracic and chin support.  The cervical area was prepped and draped in a sterile fashion.  The fluoroscope visualized the C7-T1 interspace using slight cephalad-caudad angulation and this area was marked.  Using sterile technique the superficial skin was anesthetized with 1% Lidocaine without epinephrine.  A 18 gauge Tuohoy needle was advanced under fluoroscopy using [id number] technique until ligament was engaged.  The stilette was then removed and a column of preservative free normal saline flushed through the tuohoy needle and left with a concave fluid level above the butterfly portion of the tuohoy needle.  The needle was then advanced under fluoroscopic guidance until the column of saline disappeared.  Lateral view confirmed final needle tip placement in the epidural space.  After negative aspiration for heme and CSF, 1 cc of omnipaque confirmed good cervical epiduragram.  \par \par Cervical epidurogram showed no evidence of intrathecal or intravascular flow, and good bilateral epidural flow from C3 to T2 levels.  \par \par An injectate of 3cc of preservative free normal saline plus 12 mg of betamethasone was then injected into the epidural space. The needle was subsequently removed and pressure was applied.\par \par Anesthesia personnel were present throughout the procedure.  The patient tolerated the procedure well and was instructed to contact me immediately if there were any problems.\par \par \par Robles Lopez M.D.\par

## 2023-07-17 ENCOUNTER — APPOINTMENT (OUTPATIENT)
Dept: ENDOCRINOLOGY | Facility: CLINIC | Age: 73
End: 2023-07-17
Payer: MEDICARE

## 2023-07-17 VITALS
HEIGHT: 65 IN | DIASTOLIC BLOOD PRESSURE: 75 MMHG | HEART RATE: 85 BPM | SYSTOLIC BLOOD PRESSURE: 136 MMHG | OXYGEN SATURATION: 97 % | WEIGHT: 200 LBS | BODY MASS INDEX: 33.32 KG/M2

## 2023-07-17 LAB — GLUCOSE BLDC GLUCOMTR-MCNC: 167

## 2023-07-17 PROCEDURE — 82962 GLUCOSE BLOOD TEST: CPT

## 2023-07-17 PROCEDURE — G0108 DIAB MANAGE TRN  PER INDIV: CPT

## 2023-07-17 PROCEDURE — 95251 CONT GLUC MNTR ANALYSIS I&R: CPT

## 2023-07-17 RX ORDER — INSULIN GLARGINE 300 U/ML
300 INJECTION, SOLUTION SUBCUTANEOUS DAILY
Qty: 2 | Refills: 3 | Status: ACTIVE | COMMUNITY
Start: 2018-07-30

## 2023-07-20 ENCOUNTER — APPOINTMENT (OUTPATIENT)
Dept: PAIN MANAGEMENT | Facility: CLINIC | Age: 73
End: 2023-07-20
Payer: MEDICARE

## 2023-07-20 VITALS — WEIGHT: 200 LBS | HEIGHT: 65 IN | BODY MASS INDEX: 33.32 KG/M2

## 2023-07-20 PROCEDURE — 99213 OFFICE O/P EST LOW 20 MIN: CPT

## 2023-07-20 RX ORDER — GABAPENTIN 100 MG/1
100 CAPSULE ORAL
Qty: 90 | Refills: 0 | Status: ACTIVE | COMMUNITY
Start: 2023-07-20 | End: 1900-01-01

## 2023-07-20 NOTE — HISTORY OF PRESENT ILLNESS
[Neck] : neck [Lower back] : lower back [10] : 10 [Dull/Aching] : dull/aching [Radiating] : radiating [Frequent] : frequent [Household chores] : household chores [Social interactions] : social interactions [Injection therapy] : injection therapy [Walking] : walking [Bending forward] : bending forward [Exercising] : exercising [Stairs] : stairs [FreeTextEntry1] : 07/20/2023\par PP FU C7-T1 [place] ON 07/10/23 no relief \par \par \par \par pt is following up for neck and lower back pain , pt will continue care under Dr. Lopez \par pt was a Dr. Walden patient  [] : no

## 2023-07-20 NOTE — PHYSICAL EXAM
[de-identified] : PHYSICAL EXAM\par \par Constitutional: \par Appears well, no apparent distress\par Ability to communicate: Normal\par Respiratory: non-labored breathing\par Skin: no rash noted\par Head: normocephalic, atraumatic\par Neck: no visible thyroid enlargement\par Eyes: extraocular movements intact\par Neurologic: alert and oriented x3\par Psychiatric: normal mood, affect, and behavior\par \par Cervical:\par Palpation: left trapezial spasm, left trapezius tenderness and left paracervical tenderness\par ROM: Diminished range of motion in all plains.  Patient notes pain at extremes of extension and rotation to left.  Stiffness at extremes of extension and rotation to the left\par MMT: Motor exam is 5/5 through out bilateral upper extremities.\par Sensation: Light touch and pain is intact throughout bilateral upper extremities.\par Reflexes: No sustained clonus.\par Special Testing: Positive left Spurling test \par \par Assessemnt:\par Radiculopathy of cervical region (M54.12)\par Cervicalgia (M54.2)\par \par \par Plan:\par After discussing various treatment options with the patient including but not limited to oral medications, physical therapy, exercise modalities as well as interventional spinal injections, we have decided with the following plan:\par \par MRI Review \par I personally reviewed the MRI/CT scan images and agree with the radiologist's report.  The radiological findings were discussed with the patient. \par  \par \par .\par \par The risks, benefits and alternatives of the proposed procedure were explained in detail with the patient.  The risks outlined include but are not limited to infection, bleeding, post dural puncture headache, nerve injury, a temporary increase in pain, failure to resolve symptoms, allergic reaction, symptom recurrence, and possible elevation of blood sugar.  All questions were answered to patient's satisfaction and he/she verbalized an understanding.\par \par Follow up 1-2 weeks post injection for re-evaluation.\par \par Continue home exercises, stretching, activity modification, physical therapy, and conservative care.\par \par \par \par

## 2023-08-09 ENCOUNTER — APPOINTMENT (OUTPATIENT)
Dept: PAIN MANAGEMENT | Facility: CLINIC | Age: 73
End: 2023-08-09
Payer: MEDICARE

## 2023-08-09 PROCEDURE — 82948 REAGENT STRIP/BLOOD GLUCOSE: CPT

## 2023-08-09 PROCEDURE — 62321 NJX INTERLAMINAR CRV/THRC: CPT

## 2023-08-09 NOTE — PROCEDURE
[FreeTextEntry3] : Date of Service: 08/09/2023   Account: [id number]  Patient: BELEN GARVIN   YOB: 1950  Age: 73 year   Surgeon: Robles Lopez M.D.  Pre-Operative Diagnosis: Cervical radiculopathy  Post Operative Diagnosis: Cervical radiculopathy  Procedure:  Interlaminar cervical epidural steroid injection (C6-C7) under fluoroscopic guidance   This procedure was carried out using fluoroscopic guidance.  The risks and benefits of the procedure were discussed extensively with the patient.  The consent of the patient was obtained and the following procedure was performed.  The patient was placed in the prone position using a thoracic and chin support.  The cervical area was prepped and draped in a sterile fashion.  The fluoroscope visualized the C6-C7 interspace using slight cephalad-caudad angulation and this area was marked.  Using sterile technique the superficial skin was anesthetized with 1% Lidocaine without epinephrine.  A 18 gauge Tuohoy needle was advanced under fluoroscopy using [id number] technique until ligament was engaged.  The stilette was then removed and a column of preservative free normal saline flushed through the tuohoy needle and left with a concave fluid level above the butterfly portion of the tuohoy needle.  The needle was then advanced under fluoroscopic guidance until the column of saline disappeared.  Lateral view confirmed final needle tip placement in the epidural space.  After negative aspiration for heme and CSF, 1 cc of omnipaque confirmed good cervical epiduragram.    Cervical epidurogram showed no evidence of intrathecal or intravascular flow, and good bilateral epidural flow from C3 to T2 levels.   An injectate of 3 cc of preservative free normal saline plus 12 mg of betamethasone was then injected into the epidural space. The needle was subsequently removed and pressure was applied.  Anesthesia personnel were present throughout the procedure.  The patient tolerated the procedure well and was instructed to contact me immediately if there were any problems.   Robles Lopez M.D.

## 2023-08-24 ENCOUNTER — APPOINTMENT (OUTPATIENT)
Dept: PAIN MANAGEMENT | Facility: CLINIC | Age: 73
End: 2023-08-24
Payer: MEDICARE

## 2023-08-24 VITALS — HEIGHT: 65 IN | BODY MASS INDEX: 33.32 KG/M2 | WEIGHT: 200 LBS

## 2023-08-24 DIAGNOSIS — M25.812 OTHER SPECIFIED JOINT DISORDERS, LEFT SHOULDER: ICD-10-CM

## 2023-08-24 PROCEDURE — 20552 NJX 1/MLT TRIGGER POINT 1/2: CPT | Mod: 59

## 2023-08-24 PROCEDURE — J3490M: CUSTOM | Mod: NC

## 2023-08-24 PROCEDURE — 99214 OFFICE O/P EST MOD 30 MIN: CPT | Mod: 25

## 2023-08-24 PROCEDURE — 20610 DRAIN/INJ JOINT/BURSA W/O US: CPT | Mod: LT

## 2023-08-24 NOTE — PHYSICAL EXAM
[de-identified] : PHYSICAL EXAM  Constitutional:  Appears well, no apparent distress Ability to communicate: Normal Respiratory: non-labored breathing Skin: no rash noted Head: normocephalic, atraumatic Neck: no visible thyroid enlargement Eyes: extraocular movements intact Neurologic: alert and oriented x3 Psychiatric: normal mood, affect, and behavior  Cervical: Palpation: left trapezial spasm, left trapezius tenderness and left paracervical tenderness ROM: Diminished range of motion in all plains.  Patient notes pain at extremes of extension and rotation to left.  Stiffness at extremes of extension and rotation to the left MMT: Motor exam is 5/5 through out bilateral upper extremities. Sensation: Light touch and pain is intact throughout bilateral upper extremities. Reflexes: No sustained clonus. Special Testing: Positive left Spurling test   Assessemnt: Radiculopathy of cervical region (M54.12) Cervicalgia (M54.2)   Plan: After discussing various treatment options with the patient including but not limited to oral medications, physical therapy, exercise modalities as well as interventional spinal injections, we have decided with the following plan:  MRI Review  I personally reviewed the MRI/CT scan images and agree with the radiologist's report.  The radiological findings were discussed with the patient.     .  The risks, benefits and alternatives of the proposed procedure were explained in detail with the patient.  The risks outlined include but are not limited to infection, bleeding, post dural puncture headache, nerve injury, a temporary increase in pain, failure to resolve symptoms, allergic reaction, symptom recurrence, and possible elevation of blood sugar.  All questions were answered to patient's satisfaction and he/she verbalized an understanding.  Follow up 1-2 weeks post injection for re-evaluation.  Continue home exercises, stretching, activity modification, physical therapy, and conservative care.    [Left] : left shoulder [] : motor and sensory intact distally

## 2023-08-24 NOTE — PROCEDURE
[FreeTextEntry3] : Cortisone shot into the Subacromial space is recommended because of the severity of symptoms.  The risks, benefits, and alternatives to cortisone injection were explained in full to the patient.  Risks outlined include but are not limited to infection, sepsis, bleeding, scarring, skin discoloration, temporary increase in pain, syncopal episode, failure to resolve symptoms, allergic reaction, symptom recurrence, and elevation of blood sugar in diabetics.  Patient understood the risks.  All questions were answered.  After discussion of options, patient requested an injection.  Oral informed consent was obtained and sterile prep was done of the injection site.  A mixture of 40mg of Kenalog, 1 cc of 1% Lidocaine was sterilely prepared by me.  Sterile technique was used to introduce the mixture into the subacromial bursa via the anterior lateral portal.  Patient tolerated the procedure well.  Advised to ice the injection site this evening   Trigger Point was performed because of pain inflammation Anesthesia: ethyl chloride sprayed topically.: Lidocaine .1% 2 cc Marcaine:.25% 2cc  kenalog 10mg/cc 2cc :Needle size: 25 gauge 1 1/2inch.  Medication was injected in the right wuadtracep . Patient has tried OTC's including aspirin, Ibuprofen, Aleve etc or prescription NSAIDS, and/or exercises at home and/ or physical therapy without satisfactory response After verbal consent using sterile preparation and technique.The risks, benefits, and alternatives to cortisone injection were explained in full to the patient. Risks outlined include but are not limited to infection, sepsis, bleeding, scarring, skin discoloration, temporary increase in pain, syncopal episode, failure to resolve symptoms, allergic reaction, symptom recurrence, and elevation of blood sugar in diabetics. Patient understood the risks. All questions were answered. After discussion of options, patient requested an injection. Oral informed consent was obtained and sterile prep was done of the injection site. Sterile technique was utilized for the procedure including the preparation of the solutions used for the injection. Patient tolerated the procedure well. Advised to ice the injection site this evening.  Sterile technique used Prep with alcohol locally to site.

## 2023-08-24 NOTE — REASON FOR VISIT
[FreeTextEntry2] : Interlaminar cervical epidural steroid injection (C6-C7) under fluoroscopic guidance

## 2023-08-24 NOTE — HISTORY OF PRESENT ILLNESS
[FreeTextEntry1] : pt is following up after c spine procedure states that it did not elp she is still having pain in the left side above the elbow  [Neck] : neck [10] : 10 [Dull/Aching] : dull/aching [Radiating] : radiating [Frequent] : frequent [Household chores] : household chores [Social interactions] : social interactions [Injection therapy] : injection therapy [Walking] : walking [Bending forward] : bending forward [Exercising] : exercising [Stairs] : stairs [] : no [FreeTextEntry7] : left side

## 2023-09-11 ENCOUNTER — LABORATORY RESULT (OUTPATIENT)
Age: 73
End: 2023-09-11

## 2023-09-11 ENCOUNTER — APPOINTMENT (OUTPATIENT)
Dept: ENDOCRINOLOGY | Facility: CLINIC | Age: 73
End: 2023-09-11
Payer: MEDICARE

## 2023-09-11 VITALS
OXYGEN SATURATION: 94 % | HEART RATE: 67 BPM | SYSTOLIC BLOOD PRESSURE: 157 MMHG | DIASTOLIC BLOOD PRESSURE: 87 MMHG | HEIGHT: 65 IN

## 2023-09-11 DIAGNOSIS — E10.9 TYPE 1 DIABETES MELLITUS W/OUT COMPLICATIONS: ICD-10-CM

## 2023-09-11 DIAGNOSIS — N39.0 URINARY TRACT INFECTION, SITE NOT SPECIFIED: ICD-10-CM

## 2023-09-11 LAB
GLUCOSE BLDC GLUCOMTR-MCNC: 196
HBA1C MFR BLD HPLC: 8.5

## 2023-09-11 PROCEDURE — G0108 DIAB MANAGE TRN  PER INDIV: CPT

## 2023-09-11 PROCEDURE — 82962 GLUCOSE BLOOD TEST: CPT

## 2023-09-11 PROCEDURE — 83036 HEMOGLOBIN GLYCOSYLATED A1C: CPT | Mod: QW

## 2023-09-12 LAB
APPEARANCE: ABNORMAL
BILIRUBIN URINE: NEGATIVE
BLOOD URINE: NEGATIVE
COLOR: NORMAL
GLUCOSE QUALITATIVE U: NEGATIVE MG/DL
KETONES URINE: ABNORMAL MG/DL
LEUKOCYTE ESTERASE URINE: ABNORMAL
NITRITE URINE: NEGATIVE
PH URINE: 5.5
PROTEIN URINE: NEGATIVE MG/DL
SPECIFIC GRAVITY URINE: 1.03
UROBILINOGEN URINE: 0.2 MG/DL

## 2023-09-14 LAB — BACTERIA UR CULT: NORMAL

## 2023-09-15 ENCOUNTER — NON-APPOINTMENT (OUTPATIENT)
Age: 73
End: 2023-09-15

## 2023-09-18 ENCOUNTER — APPOINTMENT (OUTPATIENT)
Dept: ENDOCRINOLOGY | Facility: CLINIC | Age: 73
End: 2023-09-18
Payer: MEDICARE

## 2023-09-18 ENCOUNTER — LABORATORY RESULT (OUTPATIENT)
Age: 73
End: 2023-09-18

## 2023-09-18 VITALS
HEIGHT: 62 IN | BODY MASS INDEX: 36.8 KG/M2 | WEIGHT: 200 LBS | SYSTOLIC BLOOD PRESSURE: 140 MMHG | OXYGEN SATURATION: 95 % | DIASTOLIC BLOOD PRESSURE: 60 MMHG | HEART RATE: 63 BPM

## 2023-09-18 LAB — GLUCOSE BLDC GLUCOMTR-MCNC: 200

## 2023-09-18 PROCEDURE — 95251 CONT GLUC MNTR ANALYSIS I&R: CPT

## 2023-09-18 PROCEDURE — 36415 COLL VENOUS BLD VENIPUNCTURE: CPT

## 2023-09-18 PROCEDURE — 82962 GLUCOSE BLOOD TEST: CPT

## 2023-09-18 PROCEDURE — 99214 OFFICE O/P EST MOD 30 MIN: CPT | Mod: 25

## 2023-09-19 LAB
ALBUMIN SERPL ELPH-MCNC: 3.7 G/DL
ALP BLD-CCNC: 92 U/L
ALT SERPL-CCNC: 6 U/L
ANION GAP SERPL CALC-SCNC: 11 MMOL/L
AST SERPL-CCNC: 15 U/L
BILIRUB SERPL-MCNC: 0.7 MG/DL
BUN SERPL-MCNC: 24 MG/DL
CALCIUM SERPL-MCNC: 9.7 MG/DL
CHLORIDE SERPL-SCNC: 106 MMOL/L
CO2 SERPL-SCNC: 27 MMOL/L
CREAT SERPL-MCNC: 0.8 MG/DL
EGFR: 77 ML/MIN/1.73M2
GLUCOSE SERPL-MCNC: 143 MG/DL
POTASSIUM SERPL-SCNC: 4.9 MMOL/L
PROT SERPL-MCNC: 5.9 G/DL
SODIUM SERPL-SCNC: 143 MMOL/L
T4 FREE SERPL-MCNC: 1.4 NG/DL
T4 SERPL-MCNC: 8.2 UG/DL
TSH SERPL-ACNC: 1.62 UIU/ML

## 2023-09-25 ENCOUNTER — NON-APPOINTMENT (OUTPATIENT)
Age: 73
End: 2023-09-25

## 2023-09-26 ENCOUNTER — NON-APPOINTMENT (OUTPATIENT)
Age: 73
End: 2023-09-26

## 2023-09-26 LAB
BACTERIA UR CULT: ABNORMAL
THYROGLOB AB SERPL IA-ACNC: 6.8 IU/ML

## 2023-09-27 ENCOUNTER — NON-APPOINTMENT (OUTPATIENT)
Age: 73
End: 2023-09-27

## 2023-09-27 NOTE — PROGRESS NOTE ADULT - SUBJECTIVE AND OBJECTIVE BOX
DIABETES FOLLOW UP : Seen earlier today    INTERVAL HX: pt endorses mouth discomfort ongoing but improved, reports fair appetite depending on what is served but overall is eating.  Cannot remember if she ate full lunch yesterday or not. Hypoglycemic to 46 after receiving 5units admelog at lunch, treated with dextrose and juice, denies symptoms. dinner admelog held resulting in rebound hyperglycemia at bedtime . Unclear reason for hypoglycemia. lowered lunch admelog . BG today mostly at goal, denies eating before FBG fingerstick today , FBG slightly above goal       Review of Systems:  General: As above  Cardiovascular: No chest pain, palpitations  Respiratory: No SOB, no cough  GI: No nausea, vomiting, abdominal pain  Endocrine: no polyuria, polydipsia or S&Sx of hypoglycemia    Allergies    Ceclor (Unknown)  iodine containing compounds (Unknown)  shellfish (Unknown)    Intolerances      MEDICATIONS  (STANDING):  atorvastatin 40 milliGRAM(s) Oral at bedtime  carbidopa/levodopa  25/100 1 Tablet(s) Oral three times a day  chlorhexidine 2% Cloths 1 Application(s) Topical daily  dextrose 40% Gel 15 Gram(s) Oral once  dextrose 5%. 1000 milliLiter(s) (50 mL/Hr) IV Continuous <Continuous>  dextrose 5%. 1000 milliLiter(s) (100 mL/Hr) IV Continuous <Continuous>  dextrose 50% Injectable 25 Gram(s) IV Push once  dextrose 50% Injectable 12.5 Gram(s) IV Push once  dextrose 50% Injectable 25 Gram(s) IV Push once  escitalopram 10 milliGRAM(s) Oral daily  FIRST- Mouthwash  BLM 10 milliLiter(s) Swish and Spit every 12 hours  fluticasone propionate 50 MICROgram(s)/spray Nasal Spray 1 Spray(s) Both Nostrils two times a day  glucagon  Injectable 1 milliGRAM(s) IntraMuscular once  heparin   Injectable 7500 Unit(s) SubCutaneous every 8 hours  insulin glargine Injectable (LANTUS) 30 Unit(s) SubCutaneous <User Schedule>  insulin lispro (ADMELOG) corrective regimen sliding scale   SubCutaneous three times a day before meals  insulin lispro (ADMELOG) corrective regimen sliding scale   SubCutaneous at bedtime  insulin lispro Injectable (ADMELOG) 8 Unit(s) SubCutaneous before dinner  insulin lispro Injectable (ADMELOG) 10 Unit(s) SubCutaneous before breakfast  insulin lispro Injectable (ADMELOG) 2 Unit(s) SubCutaneous before lunch  levothyroxine 125 MICROGram(s) Oral daily  loratadine 10 milliGRAM(s) Oral daily  losartan 100 milliGRAM(s) Oral daily  pancrelipase  (CREON 12,000 Lipase Units) 6 Capsule(s) Oral three times a day with meals  pantoprazole    Tablet 40 milliGRAM(s) Oral before breakfast  rasagiline Tablet 1 milliGRAM(s) Oral daily      PHYSICAL EXAM:  VITALS: T(C): 37.2 (12-29-21 @ 13:00)  T(F): 99 (12-29-21 @ 13:00), Max: 99 (12-28-21 @ 21:27)  HR: 77 (12-29-21 @ 13:00) (76 - 95)  BP: 126/57 (12-29-21 @ 13:00) (101/60 - 145/60)  RR:  (18 - 18)  SpO2:  (95% - 100%)  Wt(kg): --  GENERAL: female laying in bed  in NAD  Abdomen: Soft, nontender, non distended  Extremities: Warm, no edema in all 4 exts  NEURO: A&O X3    LABS:  POCT Blood Glucose.: 142 mg/dL (12-29-21 @ 12:28)  POCT Blood Glucose.: 197 mg/dL (12-29-21 @ 08:39)  POCT Blood Glucose.: 253 mg/dL (12-28-21 @ 22:15)  POCT Blood Glucose.: 201 mg/dL (12-28-21 @ 18:23)  POCT Blood Glucose.: 44 mg/dL (12-28-21 @ 17:53)  POCT Blood Glucose.: 46 mg/dL (12-28-21 @ 17:51)  POCT Blood Glucose.: 198 mg/dL (12-28-21 @ 12:27)  POCT Blood Glucose.: 269 mg/dL (12-28-21 @ 09:33)  POCT Blood Glucose.: 162 mg/dL (12-28-21 @ 00:53)  POCT Blood Glucose.: 77 mg/dL (12-27-21 @ 22:34)  POCT Blood Glucose.: 87 mg/dL (12-27-21 @ 17:59)  POCT Blood Glucose.: 267 mg/dL (12-27-21 @ 12:52)  POCT Blood Glucose.: 363 mg/dL (12-27-21 @ 08:53)  POCT Blood Glucose.: 104 mg/dL (12-26-21 @ 22:28)  POCT Blood Glucose.: 207 mg/dL (12-26-21 @ 17:37)                            12.1   10.05 )-----------( 201      ( 29 Dec 2021 07:47 )             39.2       12-29    137  |  101  |  17  ----------------------------<  163<H>  4.0   |  26  |  0.78    EGFR if : 87  EGFR if non : 75    Ca    9.0      12-29  Mg     1.80     12-29  Phos  2.9     12-29    TPro  5.6<L>  /  Alb  2.9<L>  /  TBili  0.5  /  DBili  x   /  AST  67<H>  /  ALT  17  /  AlkPhos  178<H>  12-29          Thyroid Function Tests:  12-20 @ 23:52 TSH 0.49 FreeT4 -- T3 -- Anti TPO -- Anti Thyroglobulin Ab -- TSI --          A1C with Estimated Average Glucose Result: 9.4 % (12-20-21 @ 19:29)      Estimated Average Glucose: 223 (12-20-21 @ 19:29)                         DIABETES FOLLOW UP : Seen earlier today    INTERVAL HX: pt endorses mouth discomfort ongoing but improved, reports fair appetite depending on what is served but overall is eating.  Cannot remember if she ate full lunch yesterday or not. Hypoglycemic to 46 after receiving 5units admelog at lunch, treated with dextrose and juice, denies symptoms. dinner admelog held resulting in rebound hyperglycemia at bedtime . Unclear reason for hypoglycemia. lowered lunch admelog . -253 since bedtime, denies eating before FBG fingerstick today , FBG slightly above goal       Review of Systems:  General: As above  Cardiovascular: No chest pain, palpitations  Respiratory: No SOB, no cough  GI: No nausea, vomiting, abdominal pain  Endocrine: no polyuria, polydipsia or S&Sx of hypoglycemia    Allergies    Ceclor (Unknown)  iodine containing compounds (Unknown)  shellfish (Unknown)    Intolerances      MEDICATIONS  (STANDING):  atorvastatin 40 milliGRAM(s) Oral at bedtime  carbidopa/levodopa  25/100 1 Tablet(s) Oral three times a day  chlorhexidine 2% Cloths 1 Application(s) Topical daily  dextrose 40% Gel 15 Gram(s) Oral once  dextrose 5%. 1000 milliLiter(s) (50 mL/Hr) IV Continuous <Continuous>  dextrose 5%. 1000 milliLiter(s) (100 mL/Hr) IV Continuous <Continuous>  dextrose 50% Injectable 25 Gram(s) IV Push once  dextrose 50% Injectable 12.5 Gram(s) IV Push once  dextrose 50% Injectable 25 Gram(s) IV Push once  escitalopram 10 milliGRAM(s) Oral daily  FIRST- Mouthwash  BLM 10 milliLiter(s) Swish and Spit every 12 hours  fluticasone propionate 50 MICROgram(s)/spray Nasal Spray 1 Spray(s) Both Nostrils two times a day  glucagon  Injectable 1 milliGRAM(s) IntraMuscular once  heparin   Injectable 7500 Unit(s) SubCutaneous every 8 hours  insulin glargine Injectable (LANTUS) 30 Unit(s) SubCutaneous <User Schedule>  insulin lispro (ADMELOG) corrective regimen sliding scale   SubCutaneous three times a day before meals  insulin lispro (ADMELOG) corrective regimen sliding scale   SubCutaneous at bedtime  insulin lispro Injectable (ADMELOG) 8 Unit(s) SubCutaneous before dinner  insulin lispro Injectable (ADMELOG) 10 Unit(s) SubCutaneous before breakfast  insulin lispro Injectable (ADMELOG) 2 Unit(s) SubCutaneous before lunch  levothyroxine 125 MICROGram(s) Oral daily  loratadine 10 milliGRAM(s) Oral daily  losartan 100 milliGRAM(s) Oral daily  pancrelipase  (CREON 12,000 Lipase Units) 6 Capsule(s) Oral three times a day with meals  pantoprazole    Tablet 40 milliGRAM(s) Oral before breakfast  rasagiline Tablet 1 milliGRAM(s) Oral daily      PHYSICAL EXAM:  VITALS: T(C): 37.2 (12-29-21 @ 13:00)  T(F): 99 (12-29-21 @ 13:00), Max: 99 (12-28-21 @ 21:27)  HR: 77 (12-29-21 @ 13:00) (76 - 95)  BP: 126/57 (12-29-21 @ 13:00) (101/60 - 145/60)  RR:  (18 - 18)  SpO2:  (95% - 100%)  Wt(kg): --  GENERAL: female laying in bed  in NAD  Abdomen: Soft, nontender, non distended  Extremities: Warm, no edema in all 4 exts  NEURO: A&O X3    LABS:  POCT Blood Glucose.: 142 mg/dL (12-29-21 @ 12:28)  POCT Blood Glucose.: 197 mg/dL (12-29-21 @ 08:39)  POCT Blood Glucose.: 253 mg/dL (12-28-21 @ 22:15)  POCT Blood Glucose.: 201 mg/dL (12-28-21 @ 18:23)  POCT Blood Glucose.: 44 mg/dL (12-28-21 @ 17:53)  POCT Blood Glucose.: 46 mg/dL (12-28-21 @ 17:51)  POCT Blood Glucose.: 198 mg/dL (12-28-21 @ 12:27)  POCT Blood Glucose.: 269 mg/dL (12-28-21 @ 09:33)  POCT Blood Glucose.: 162 mg/dL (12-28-21 @ 00:53)  POCT Blood Glucose.: 77 mg/dL (12-27-21 @ 22:34)  POCT Blood Glucose.: 87 mg/dL (12-27-21 @ 17:59)  POCT Blood Glucose.: 267 mg/dL (12-27-21 @ 12:52)  POCT Blood Glucose.: 363 mg/dL (12-27-21 @ 08:53)  POCT Blood Glucose.: 104 mg/dL (12-26-21 @ 22:28)  POCT Blood Glucose.: 207 mg/dL (12-26-21 @ 17:37)                            12.1   10.05 )-----------( 201      ( 29 Dec 2021 07:47 )             39.2       12-29    137  |  101  |  17  ----------------------------<  163<H>  4.0   |  26  |  0.78    EGFR if : 87  EGFR if non : 75    Ca    9.0      12-29  Mg     1.80     12-29  Phos  2.9     12-29    TPro  5.6<L>  /  Alb  2.9<L>  /  TBili  0.5  /  DBili  x   /  AST  67<H>  /  ALT  17  /  AlkPhos  178<H>  12-29          Thyroid Function Tests:  12-20 @ 23:52 TSH 0.49 FreeT4 -- T3 -- Anti TPO -- Anti Thyroglobulin Ab -- TSI --          A1C with Estimated Average Glucose Result: 9.4 % (12-20-21 @ 19:29)      Estimated Average Glucose: 223 (12-20-21 @ 19:29)                         none

## 2023-10-05 ENCOUNTER — APPOINTMENT (OUTPATIENT)
Dept: PAIN MANAGEMENT | Facility: CLINIC | Age: 73
End: 2023-10-05
Payer: MEDICARE

## 2023-10-05 VITALS — WEIGHT: 200 LBS | HEIGHT: 62 IN | BODY MASS INDEX: 36.8 KG/M2

## 2023-10-05 PROCEDURE — 99214 OFFICE O/P EST MOD 30 MIN: CPT | Mod: 25

## 2023-10-05 PROCEDURE — 20552 NJX 1/MLT TRIGGER POINT 1/2: CPT

## 2023-11-22 ENCOUNTER — APPOINTMENT (OUTPATIENT)
Dept: PAIN MANAGEMENT | Facility: CLINIC | Age: 73
End: 2023-11-22

## 2023-12-01 ENCOUNTER — APPOINTMENT (OUTPATIENT)
Dept: ORTHOPEDIC SURGERY | Facility: CLINIC | Age: 73
End: 2023-12-01
Payer: MEDICARE

## 2023-12-01 VITALS — WEIGHT: 210 LBS | BODY MASS INDEX: 33.75 KG/M2 | HEIGHT: 66 IN

## 2023-12-01 DIAGNOSIS — E11.9 TYPE 2 DIABETES MELLITUS W/OUT COMPLICATIONS: ICD-10-CM

## 2023-12-01 DIAGNOSIS — M19.012 PRIMARY OSTEOARTHRITIS, LEFT SHOULDER: ICD-10-CM

## 2023-12-01 PROCEDURE — 99204 OFFICE O/P NEW MOD 45 MIN: CPT

## 2023-12-01 PROCEDURE — 73030 X-RAY EXAM OF SHOULDER: CPT | Mod: LT

## 2023-12-01 PROCEDURE — 73010 X-RAY EXAM OF SHOULDER BLADE: CPT | Mod: LT

## 2023-12-07 ENCOUNTER — RX RENEWAL (OUTPATIENT)
Age: 73
End: 2023-12-07

## 2024-01-04 ENCOUNTER — APPOINTMENT (OUTPATIENT)
Dept: PAIN MANAGEMENT | Facility: CLINIC | Age: 74
End: 2024-01-04

## 2024-01-22 ENCOUNTER — APPOINTMENT (OUTPATIENT)
Dept: ENDOCRINOLOGY | Facility: CLINIC | Age: 74
End: 2024-01-22

## 2024-02-01 ENCOUNTER — APPOINTMENT (OUTPATIENT)
Dept: PAIN MANAGEMENT | Facility: CLINIC | Age: 74
End: 2024-02-01
Payer: MEDICARE

## 2024-02-01 VITALS — BODY MASS INDEX: 33.75 KG/M2 | HEIGHT: 66 IN | WEIGHT: 210 LBS

## 2024-02-01 DIAGNOSIS — M54.2 CERVICALGIA: ICD-10-CM

## 2024-02-01 DIAGNOSIS — M79.18 MYALGIA, OTHER SITE: ICD-10-CM

## 2024-02-01 DIAGNOSIS — M54.12 RADICULOPATHY, CERVICAL REGION: ICD-10-CM

## 2024-02-01 PROCEDURE — 20552 NJX 1/MLT TRIGGER POINT 1/2: CPT

## 2024-02-01 PROCEDURE — 99214 OFFICE O/P EST MOD 30 MIN: CPT | Mod: 25

## 2024-02-01 PROCEDURE — J3490M: CUSTOM | Mod: NC

## 2024-02-01 NOTE — PHYSICAL EXAM
[de-identified] : PHYSICAL EXAM  Constitutional:  Appears well, no apparent distress Ability to communicate: Normal Respiratory: non-labored breathing Skin: no rash noted Head: normocephalic, atraumatic Neck: no visible thyroid enlargement Eyes: extraocular movements intact Neurologic: alert and oriented x3 Psychiatric: normal mood, affect, and behavior  Cervical: Palpation: left trapezial spasm, left trapezius tenderness and left paracervical tenderness ROM: Diminished range of motion in all plains.  Patient notes pain at extremes of extension and rotation to left.  Stiffness at extremes of extension and rotation to the left MMT: Motor exam is 5/5 through out bilateral upper extremities. Sensation: Light touch and pain is intact throughout bilateral upper extremities. Reflexes: No sustained clonus. Special Testing: Positive left Spurling test

## 2024-02-01 NOTE — HISTORY OF PRESENT ILLNESS
[Neck] : neck [Lower back] : lower back [9] : 9 [Dull/Aching] : dull/aching [Radiating] : radiating [Intermittent] : intermittent [Household chores] : household chores [Social interactions] : social interactions [Injection therapy] : injection therapy [Walking] : walking [Bending forward] : bending forward [Exercising] : exercising [Stairs] : stairs [FreeTextEntry1] : pt is following up for c spine , l spine  [] : no

## 2024-02-09 RX ORDER — LEVOTHYROXINE SODIUM 125 UG/1
125 CAPSULE ORAL
Qty: 90 | Refills: 1 | Status: ACTIVE | COMMUNITY
Start: 2019-07-23 | End: 1900-01-01

## 2024-03-06 ENCOUNTER — APPOINTMENT (OUTPATIENT)
Dept: ORTHOPEDIC SURGERY | Facility: CLINIC | Age: 74
End: 2024-03-06
Payer: MEDICARE

## 2024-03-06 VITALS — BODY MASS INDEX: 33.75 KG/M2 | WEIGHT: 210 LBS | HEIGHT: 66 IN

## 2024-03-06 PROCEDURE — 20611 DRAIN/INJ JOINT/BURSA W/US: CPT | Mod: LT

## 2024-03-06 PROCEDURE — J3490M: CUSTOM | Mod: NC

## 2024-03-06 PROCEDURE — 99215 OFFICE O/P EST HI 40 MIN: CPT | Mod: 25

## 2024-03-06 NOTE — PHYSICAL EXAM
[Left] : left shoulder [] : pain with external rotation [5 ___] : forward flexion 5[unfilled]/5 [FreeTextEntry9] : FE 95 ER 5

## 2024-03-06 NOTE — ASSESSMENT
[FreeTextEntry1] : Advanced L GH DJD,  [facility] Xrays reviewed. Discussed op versus non op tx, including the r/b/a/c of both. Discussed rehab and recovery after TSA/RSA. Discussed timing, frequency and efficacy of cortisone injections. CSI given by Dr. Lopez 10/20/23, minimal relief. Discussed risks of repeated cortisone injections.  She is Insulin dependent DM, will try to avoid CSI. Discussed trial of visco supplementation. Celebrex renewed. L shoulder orthovisc #1 given. RTO 1 week.  Procedure Note: Viscosupplementation Injection: X-ray evidence of Osteoarthritis on this or prior visit and Patient has tried OTC's including aspirin, Ibuprofen, Aleve etc or prescription NSAIDs, and/or exercises at home and/ or physical therapy without satisfactory response.   An injection of Orthovisc 2ml was injected into the left shoulder. The risks, benefits, and alternatives to Viscosupplementation injection were explained in full to the patient. Risks outlined include but are not limited to infection, sepsis, bleeding, scarring, skin discoloration, temporary increase in pain, syncopal episode, failure to resolve symptoms, allergic reaction, and symptom recurrence. Signs and symptoms of infection reviewed and patient advised to call immediately for redness, fevers, and/or chills. Patient understood the risks. All questions were answered. After discussion of options, patient requested Viscosupplementation. Oral informed consent was obtained and sterile prep of the injection site was performed using alcohol. Sterile technique was utilized for the procedure including the preparation of the solutions used for the injection. Ethyl chloride spray was used topically.  Sterile technique used. Patient tolerated procedure well. Post Procedure Instructions: Patient was advised to call if redness, pain, or fever occur and apply ice for 15 min. out of every hour for the next 12-24 hours as tolerated. patient was advised to rest the joint(s) for 2 days.  Ultrasound Guidance was used for the following reasons: for Glenohumeral injection.  Ultrasound guided injection was performed of the shoulder, visualization of the needle and placement of injection was performed without complication.

## 2024-03-06 NOTE — HISTORY OF PRESENT ILLNESS
[Left Arm] : left arm [de-identified] : 3/6/24: 72 yo RHD female with left shoulder pain for years. She reports pain into her upper arm. There is increased pain at night. She sees Dr. Lopez for c-spine and had ANTONETTE with no relief. She had shoulder CSI on 10/20/23 with some relief. She saw Dr. aErl and was referred for surgical consult. She takes Celebrex with some relief. 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

## 2024-03-13 ENCOUNTER — APPOINTMENT (OUTPATIENT)
Dept: ORTHOPEDIC SURGERY | Facility: CLINIC | Age: 74
End: 2024-03-13
Payer: MEDICARE

## 2024-03-13 VITALS — HEIGHT: 66 IN | BODY MASS INDEX: 33.75 KG/M2 | WEIGHT: 210 LBS

## 2024-03-13 DIAGNOSIS — E11.3293 TYPE 2 DIABETES MELLITUS WITH MILD NONPROLIFERATIVE DIABETIC RETINOPATHY WITHOUT MACULAR EDEMA, BILATERAL: ICD-10-CM

## 2024-03-13 DIAGNOSIS — Z85.850 PERSONAL HISTORY OF MALIGNANT NEOPLASM OF THYROID: ICD-10-CM

## 2024-03-13 PROCEDURE — 20611 DRAIN/INJ JOINT/BURSA W/US: CPT | Mod: LT

## 2024-03-13 NOTE — HISTORY OF PRESENT ILLNESS
[de-identified] : 3/13/24: Here for Orthovisc #2 L shoulder   3/6/24: 72 yo RHD female with left shoulder pain for years. She reports pain into her upper arm. There is increased pain at night. She sees Dr. Lopez for c-spine and had ANTONETTE with no relief. She had shoulder CSI on 10/20/23 with some relief. She saw Dr. Earl and was referred for surgical consult. She takes Celebrex with some relief. H/o R RSA by Dr. Stinson in 2018  PMHx: DM on insulin, Parkinsons, [place]

## 2024-03-13 NOTE — ASSESSMENT
[FreeTextEntry1] : Advanced L GH DJD,  prior R RSA at [facility] 2018. Xrays reviewed. Discussed op versus non op tx, including the r/b/a/c of both. Discussed rehab and recovery after TSA/RSA. Discussed timing, frequency and efficacy of cortisone injections. CSI given by Dr. Lopez 10/20/23, minimal relief. Discussed risks of repeated cortisone injections.  She is Insulin dependent DM, will try to avoid CSI. Discussed trial of visco supplementation. Celebrex renewed. L shoulder orthovisc #2 given. RTO 1 week.  Procedure Note: Viscosupplementation Injection: X-ray evidence of Osteoarthritis on this or prior visit and Patient has tried OTC's including aspirin, Ibuprofen, Aleve etc or prescription NSAIDs, and/or exercises at home and/ or physical therapy without satisfactory response.   An injection of Orthovisc 2ml was injected into the left shoulder. The risks, benefits, and alternatives to Viscosupplementation injection were explained in full to the patient. Risks outlined include but are not limited to infection, sepsis, bleeding, scarring, skin discoloration, temporary increase in pain, syncopal episode, failure to resolve symptoms, allergic reaction, and symptom recurrence. Signs and symptoms of infection reviewed and patient advised to call immediately for redness, fevers, and/or chills. Patient understood the risks. All questions were answered. After discussion of options, patient requested Viscosupplementation. Oral informed consent was obtained and sterile prep of the injection site was performed using alcohol. Sterile technique was utilized for the procedure including the preparation of the solutions used for the injection. Ethyl chloride spray was used topically.  Sterile technique used. Patient tolerated procedure well. Post Procedure Instructions: Patient was advised to call if redness, pain, or fever occur and apply ice for 15 min. out of every hour for the next 12-24 hours as tolerated. patient was advised to rest the joint(s) for 2 days.  Ultrasound Guidance was used for the following reasons: for Glenohumeral injection.  Ultrasound guided injection was performed of the shoulder, visualization of the needle and placement of injection was performed without complication.

## 2024-03-13 NOTE — PHYSICAL EXAM
[Left] : left shoulder [5 ___] : forward flexion 5[unfilled]/5 [] : pain with external rotation [FreeTextEntry9] : FE 95 ER 5

## 2024-03-21 ENCOUNTER — OFFICE (OUTPATIENT)
Facility: LOCATION | Age: 74
Setting detail: OPHTHALMOLOGY
End: 2024-03-21
Payer: MEDICARE

## 2024-03-21 DIAGNOSIS — H26.493: ICD-10-CM

## 2024-03-21 DIAGNOSIS — H16.223: ICD-10-CM

## 2024-03-21 DIAGNOSIS — E10.9: ICD-10-CM

## 2024-03-21 PROBLEM — E11.9 DIABETES TYPE 2 NO RETINOPATHY: Status: ACTIVE | Noted: 2024-03-21

## 2024-03-21 PROCEDURE — 92004 COMPRE OPH EXAM NEW PT 1/>: CPT | Performed by: OPHTHALMOLOGY

## 2024-03-21 ASSESSMENT — REFRACTION_CURRENTRX
OS_CYLINDER: -0.75
OD_ADD: +2.75
OD_AXIS: 087
OD_OVR_VA: 20/
OS_OVR_VA: 20/
OD_SPHERE: +0.50
OD_CYLINDER: -0.75
OS_ADD: +2.75
OS_AXIS: 170
OS_SPHERE: PLANO

## 2024-03-21 ASSESSMENT — REFRACTION_MANIFEST
OD_CYLINDER: -0.75
OS_SPHERE: PLANO
OD_AXIS: 090
OS_ADD: +2.75
OS_CYLINDER: -0.75
OD_SPHERE: +0.50
OS_AXIS: 170
OD_ADD: +2.75

## 2024-03-21 ASSESSMENT — SPHEQUIV_DERIVED: OD_SPHEQUIV: 0.125

## 2024-03-22 ENCOUNTER — APPOINTMENT (OUTPATIENT)
Dept: ORTHOPEDIC SURGERY | Facility: CLINIC | Age: 74
End: 2024-03-22
Payer: MEDICARE

## 2024-03-22 VITALS — HEIGHT: 66 IN | WEIGHT: 210 LBS | BODY MASS INDEX: 33.75 KG/M2

## 2024-03-22 PROCEDURE — 20611 DRAIN/INJ JOINT/BURSA W/US: CPT | Mod: LT

## 2024-03-22 NOTE — HISTORY OF PRESENT ILLNESS
[de-identified] : 3/22/24: Here for L shoulder orthovisc #3.  3/13/24: Here for Orthovisc #2 L shoulder   3/6/24: 74 yo RHD female with left shoulder pain for years. She reports pain into her upper arm. There is increased pain at night. She sees Dr. Lopez for c-spine and had ANTONETTE with no relief. She had shoulder CSI on 10/20/23 with some relief. She saw Dr. Earl and was referred for surgical consult. She takes Celebrex with some relief. H/o R RSA by Dr. Stinson in 2018  PMHx: DM on insulin, Parkinsons, [place]

## 2024-03-22 NOTE — ASSESSMENT
[FreeTextEntry1] : Advanced L GH DJD,  prior R RSA at [facility] 2018. Xrays reviewed. Discussed op versus non op tx, including the r/b/a/c of both. Discussed rehab and recovery after TSA/RSA. Discussed timing, frequency and efficacy of cortisone injections. CSI given by Dr. Lopez 10/20/23, minimal relief. Discussed risks of repeated cortisone injections.  She is Insulin dependent DM, will try to avoid CSI. Discussed trial of visco supplementation. Celebrex renewed. L shoulder orthovisc #3 given. RTO 1 week.  Procedure Note: Viscosupplementation Injection: X-ray evidence of Osteoarthritis on this or prior visit and Patient has tried OTC's including aspirin, Ibuprofen, Aleve etc or prescription NSAIDs, and/or exercises at home and/ or physical therapy without satisfactory response.   An injection of Orthovisc 2ml was injected into the left shoulder. The risks, benefits, and alternatives to Viscosupplementation injection were explained in full to the patient. Risks outlined include but are not limited to infection, sepsis, bleeding, scarring, skin discoloration, temporary increase in pain, syncopal episode, failure to resolve symptoms, allergic reaction, and symptom recurrence. Signs and symptoms of infection reviewed and patient advised to call immediately for redness, fevers, and/or chills. Patient understood the risks. All questions were answered. After discussion of options, patient requested Viscosupplementation. Oral informed consent was obtained and sterile prep of the injection site was performed using alcohol. Sterile technique was utilized for the procedure including the preparation of the solutions used for the injection. Ethyl chloride spray was used topically.  Sterile technique used. Patient tolerated procedure well. Post Procedure Instructions: Patient was advised to call if redness, pain, or fever occur and apply ice for 15 min. out of every hour for the next 12-24 hours as tolerated. patient was advised to rest the joint(s) for 2 days.  Ultrasound Guidance was used for the following reasons: for Glenohumeral injection.  Ultrasound guided injection was performed of the shoulder, visualization of the needle and placement of injection was performed without complication.

## 2024-03-27 ENCOUNTER — APPOINTMENT (OUTPATIENT)
Dept: ORTHOPEDIC SURGERY | Facility: CLINIC | Age: 74
End: 2024-03-27
Payer: MEDICARE

## 2024-03-27 VITALS — HEIGHT: 66 IN | BODY MASS INDEX: 33.75 KG/M2 | WEIGHT: 210 LBS

## 2024-03-27 PROCEDURE — 20611 DRAIN/INJ JOINT/BURSA W/US: CPT | Mod: LT

## 2024-03-27 NOTE — HISTORY OF PRESENT ILLNESS
[de-identified] : 3/27/24: Here for L shoulder orthovisc #4.  3/22/24: Here for L shoulder orthovisc #3.  3/13/24: Here for Orthovisc #2 L shoulder   3/6/24: 74 yo RHD female with left shoulder pain for years. She reports pain into her upper arm. There is increased pain at night. She sees Dr. Lopez for c-spine and had ANTONETTE with no relief. She had shoulder CSI on 10/20/23 with some relief. She saw Dr. Earl and was referred for surgical consult. She takes Celebrex with some relief. H/o R RSA by Dr. Stinson in 2018  PMHx: DM on insulin, Parkinsons, [place]

## 2024-03-27 NOTE — ASSESSMENT
[FreeTextEntry1] : Advanced L GH DJD,  prior R RSA at [facility] 2018. Xrays reviewed. Discussed op versus non op tx, including the r/b/a/c of both. Discussed rehab and recovery after TSA/RSA. Discussed timing, frequency and efficacy of cortisone injections. CSI given by Dr. Lopez 10/20/23, minimal relief. Discussed risks of repeated cortisone injections.  She is Insulin dependent DM, will try to avoid CSI. Discussed trial of visco supplementation. Celebrex renewed. L shoulder orthovisc #4 given. RTO 6 weeks.  Procedure Note: Viscosupplementation Injection: X-ray evidence of Osteoarthritis on this or prior visit and Patient has tried OTC's including aspirin, Ibuprofen, Aleve etc or prescription NSAIDs, and/or exercises at home and/ or physical therapy without satisfactory response.   An injection of Orthovisc 2ml was injected into the left shoulder. The risks, benefits, and alternatives to Viscosupplementation injection were explained in full to the patient. Risks outlined include but are not limited to infection, sepsis, bleeding, scarring, skin discoloration, temporary increase in pain, syncopal episode, failure to resolve symptoms, allergic reaction, and symptom recurrence. Signs and symptoms of infection reviewed and patient advised to call immediately for redness, fevers, and/or chills. Patient understood the risks. All questions were answered. After discussion of options, patient requested Viscosupplementation. Oral informed consent was obtained and sterile prep of the injection site was performed using alcohol. Sterile technique was utilized for the procedure including the preparation of the solutions used for the injection. Ethyl chloride spray was used topically.  Sterile technique used. Patient tolerated procedure well. Post Procedure Instructions: Patient was advised to call if redness, pain, or fever occur and apply ice for 15 min. out of every hour for the next 12-24 hours as tolerated. patient was advised to rest the joint(s) for 2 days.  Ultrasound Guidance was used for the following reasons: for Glenohumeral injection.  Ultrasound guided injection was performed of the shoulder, visualization of the needle and placement of injection was performed without complication.

## 2024-04-04 NOTE — PROGRESS NOTE ADULT - PROBLEM SELECTOR PLAN 9
Matty Galeana Jr. is requesting refill(s) pravastatin  Last OV 10/24/23 (pertaining to medication)  LR 10/12/23 (per medication requested)  Next office visit scheduled or attempted Yes   If no, reason:  4/26/24      #Hypothyroidism   - patient with thyroid cancer s/p resection in 2000 now with hypothyroidism   - follows with Dr. Ennis outpatient, currently on tirosint levothyroxine sodium) 125mcg daily   - 100 mg IV levothyroxine equivalent   - TSH 10/2021 1.00,  TSH here WNL    PLAN  - c/w IV levothyroxine 100

## 2024-05-02 ENCOUNTER — APPOINTMENT (OUTPATIENT)
Dept: PAIN MANAGEMENT | Facility: CLINIC | Age: 74
End: 2024-05-02

## 2024-05-08 ENCOUNTER — RX RENEWAL (OUTPATIENT)
Age: 74
End: 2024-05-08

## 2024-05-08 ENCOUNTER — APPOINTMENT (OUTPATIENT)
Dept: ORTHOPEDIC SURGERY | Facility: CLINIC | Age: 74
End: 2024-05-08
Payer: MEDICARE

## 2024-05-08 VITALS — BODY MASS INDEX: 33.75 KG/M2 | HEIGHT: 66 IN | WEIGHT: 210 LBS

## 2024-05-08 DIAGNOSIS — M19.012 PRIMARY OSTEOARTHRITIS, LEFT SHOULDER: ICD-10-CM

## 2024-05-08 DIAGNOSIS — S40.012A CONTUSION OF LEFT SHOULDER, INITIAL ENCOUNTER: ICD-10-CM

## 2024-05-08 PROCEDURE — 99214 OFFICE O/P EST MOD 30 MIN: CPT | Mod: 25

## 2024-05-08 PROCEDURE — 73030 X-RAY EXAM OF SHOULDER: CPT | Mod: LT

## 2024-05-08 PROCEDURE — J3490M: CUSTOM | Mod: NC

## 2024-05-08 PROCEDURE — 20611 DRAIN/INJ JOINT/BURSA W/US: CPT | Mod: LT

## 2024-05-08 PROCEDURE — 73010 X-RAY EXAM OF SHOULDER BLADE: CPT | Mod: LT

## 2024-05-08 NOTE — ASSESSMENT
[FreeTextEntry1] : Advanced L GH DJD,  prior R RSA at [facility] 2018. Xrays reviewed. Discussed op versus non op tx, including the r/b/a/c of both. Discussed rehab and recovery after TSA/RSA. Discussed timing, frequency and efficacy of cortisone injections. CSI given by Dr. Lopez 10/20/23, minimal relief. Discussed risks of repeated cortisone injections.  She is Insulin dependent DM, will try to avoid CSI. Discussed trial of visco supplementation. Celebrex renewed. Orthovisc finished on 3/27/24 with minimal relief.    L GH injection today. RTO prn.  Procedure Note: Large Joint Injection was performed because of pain and inflammation, failure of conservative treatment.   Medications: Depo-Medrol: 1 cc, 80 mg. Lidocaine: 2 cc, 1%.  Marcaine: 2 cc, .25%.   Medication was injected in the left glenohumeral joint. Patient has tried OTC's including aspirin, Ibuprofen, Aleve etc or prescription NSAIDs, and/or exercises at home and/ or physical therapy without satisfactory response. The risks, benefits, and alternatives to cortisone injection were explained in full to the patient. Risks outlined include but are not limited to infection, sepsis, bleeding, scarring, skin discoloration, temporary increase in pain, syncopal episode, failure to resolve symptoms, allergic reaction, symptom recurrence, and elevation of blood sugar in diabetics. Patient understood the risks. All questions were answered. After discussion of options, patient requested an injection. Oral informed consent was obtained and sterile prep of the injection site was performed using alcohol. Sterile technique was utilized for the procedure including the preparation of the solutions used for the injection. Ethyl chloride spray was used topically.  Sterile technique used. Patient tolerated procedure well. Post Procedure Instructions: Patient was advised to call if redness, pain, or fever occur and apply ice for 15 min. out of every hour for the next 12-24 hours as tolerated. patient was advised to rest the joint(s) for 2 days.  Ultrasound Guidance was used for the following reasons: for Glenohumeral injection.  Ultrasound guided injection was performed of the shoulder, visualization of the needle and placement of injection was performed without complication.

## 2024-05-08 NOTE — HISTORY OF PRESENT ILLNESS
[de-identified] : 5/8/24:  Follow up left shoulder.  Increased pain after a new fall since last visit.   She has had about 3 falls since (last one being end of april 2024).   She reports minimal relief after the orthovisc injections.   3/27/24: Here for L shoulder orthovisc #4.  3/22/24: Here for L shoulder orthovisc #3.  3/13/24: Here for Orthovisc #2 L shoulder   3/6/24: 74 yo RHD female with left shoulder  pain for years. She reports pain into her upper arm. There is increased pain at night. She sees Dr. Lopez for c-spine and had ANTONETTE with no relief. She had shoulder CSI on 10/20/23 with some relief. She saw Dr. Earl and was referred for surgical consult. She takes Celebrex with some relief. H/o R RSA by Dr. Stinson in 2018  PMHx: DM on insulin, Parkinsons, [place]

## 2024-05-08 NOTE — PHYSICAL EXAM
[Left] : left shoulder [5 ___] : forward flexion 5[unfilled]/5 [] : ecchymosis [FreeTextEntry9] : FE 90 ER 5

## 2024-05-28 ENCOUNTER — LABORATORY RESULT (OUTPATIENT)
Age: 74
End: 2024-05-28

## 2024-05-28 ENCOUNTER — APPOINTMENT (OUTPATIENT)
Dept: ENDOCRINOLOGY | Facility: CLINIC | Age: 74
End: 2024-05-28
Payer: MEDICARE

## 2024-05-28 VITALS
DIASTOLIC BLOOD PRESSURE: 83 MMHG | BODY MASS INDEX: 32.47 KG/M2 | HEART RATE: 83 BPM | OXYGEN SATURATION: 97 % | SYSTOLIC BLOOD PRESSURE: 126 MMHG | WEIGHT: 202 LBS | HEIGHT: 66 IN

## 2024-05-28 DIAGNOSIS — I10 ESSENTIAL (PRIMARY) HYPERTENSION: ICD-10-CM

## 2024-05-28 DIAGNOSIS — Z86.69 PERSONAL HISTORY OF OTHER DISEASES OF THE NERVOUS SYSTEM AND SENSE ORGANS: ICD-10-CM

## 2024-05-28 DIAGNOSIS — E10.9 TYPE 1 DIABETES MELLITUS W/OUT COMPLICATIONS: ICD-10-CM

## 2024-05-28 DIAGNOSIS — Z85.850 PERSONAL HISTORY OF MALIGNANT NEOPLASM OF THYROID: ICD-10-CM

## 2024-05-28 DIAGNOSIS — E78.5 HYPERLIPIDEMIA, UNSPECIFIED: ICD-10-CM

## 2024-05-28 DIAGNOSIS — E89.0 POSTPROCEDURAL HYPOTHYROIDISM: ICD-10-CM

## 2024-05-28 LAB — GLUCOSE BLDC GLUCOMTR-MCNC: 130

## 2024-05-28 PROCEDURE — 99215 OFFICE O/P EST HI 40 MIN: CPT | Mod: 25

## 2024-05-28 PROCEDURE — 95251 CONT GLUC MNTR ANALYSIS I&R: CPT

## 2024-05-28 PROCEDURE — 82962 GLUCOSE BLOOD TEST: CPT

## 2024-05-28 RX ORDER — PEN NEEDLE, DIABETIC 29 G X1/2"
31G X 5 MM NEEDLE, DISPOSABLE MISCELLANEOUS
Qty: 200 | Refills: 5 | Status: ACTIVE | COMMUNITY
Start: 2017-09-25 | End: 1900-01-01

## 2024-05-28 NOTE — PHYSICAL EXAM
[Alert] : alert [Well Nourished] : well nourished [No Acute Distress] : no acute distress [Well Developed] : well developed [Normal Sclera/Conjunctiva] : normal sclera/conjunctiva [EOMI] : extra ocular movement intact [No Proptosis] : no proptosis [Normal Oropharynx] : the oropharynx was normal [Well Healed Scar] : well healed scar [No Respiratory Distress] : no respiratory distress [Clear to Auscultation] : lungs were clear to auscultation bilaterally [Normal PMI] : the apical impulse was normal [Normal S1, S2] : normal S1 and S2 [Normal Rate] : heart rate was normal [Regular Rhythm] : with a regular rhythm [Pedal Pulses Normal] : the pedal pulses are present [Normal Bowel Sounds] : normal bowel sounds [Not Tender] : non-tender [Not Distended] : not distended [Soft] : abdomen soft [Normal Anterior Cervical Nodes] : no anterior cervical lymphadenopathy [No Spinal Tenderness] : no spinal tenderness [Spine Straight] : spine straight [Normal Reflexes] : deep tendon reflexes were 2+ and symmetric [Oriented x3] : oriented to person, place, and time [Normal Insight/Judgement] : insight and judgment were intact [Acanthosis Nigricans] : no acanthosis nigricans [de-identified] : trace pedal edema  [de-identified] : stasis dermatitis lower legs , multiple ecchymoses on arms with some bandaged  [de-identified] : stiffness in gait

## 2024-05-28 NOTE — HISTORY OF PRESENT ILLNESS
[FreeTextEntry1] : Patient is here today for follow-up visit. She has a history of thyroid cancer. She denies any hoarseness or upper respiratory infection symptoms. Given her history of thyroid cancer she is nervous about this. A neck sonogram was done and except for some small  benign lymph nodes it was negative in February and stable in July 2022..  She is currently on MDI for type 1 diabetes and uses the Freestyle sensor; due to some episodes in the past of hypoglycemia unawareness and she lives alone, she now has Kristie sensor. She was trained on the T slim and Dexcom but ended up hospitalized for high sugars end of December so no longer comfortable using the pump. She was in rehab after do to difficulty walking.   She is using 22 u of Toujeo and a formula for mealtime Apidra insulin.    Last visit with CDE using an In Pen was discussed to better tract her meal time insulin doses.  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 [Continuous Glucose Monitoring] : Continuous Glucose Monitoring: Yes [Kristie] : Kristie [FreeTextEntry2] : 54 [FreeTextEntry3] : 21+12 [FreeTextEntry4] : 7+6 [de-identified] : 7 [FreeTextEntry5] : 155 [FreeTextEntry6] : 54.5

## 2024-05-28 NOTE — ASSESSMENT
[Diabetes Foot Care] : diabetes foot care [Long Term Vascular Complications] : long term vascular complications of diabetes [Carbohydrate Consistent Diet] : carbohydrate consistent diet [Importance of Diet and Exercise] : importance of diet and exercise to improve glycemic control, achieve weight loss and improve cardiovascular health [Hypoglycemia Management] : hypoglycemia management [Glucagon Use] : glucagon use [Self Monitoring of Blood Glucose] : self monitoring of blood glucose [Retinopathy Screening] : Patient was referred to ophthalmology for retinopathy screening [Diabetic Medications] : Risks and benefits of diabetic medications were discussed [Levothyroxine] : The patient was instructed to take Levothyroxine on an empty stomach, separate from vitamins, and wait at least 30 minutes before eating [FreeTextEntry1] : 1) Thyroid cancer history/hypothyroidism  - Patient generally does a neck sonogram yearly for her  history of papillary thyroid cancer. No change in Tirosint dose.TG ab lower, last neck sonogram stable. Recent TSh stable..  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  2)  type 1 Diabetes - will remain off Tandem pump, she is using the Kristie sensor  To have HS snack especially when sees it dropping on the sensor. Continue to take 4 injections of insulin daily. Monitors sugar 6-8 times daily.  Carb ratio 15 Target 150 before meals  CF 50. Always factor in arrows and if before eating and sugar under 80 have 4 OZ OJ  or equivalent first. Must focus first and avoiding low sugars, Will see CDE in a few weeks. has agreed to use in pen ? . Upgrade to kristie 3. when sees CDE.  Encourage healthy lifestyle including a low carb diet and exercise as tolerated. Monitor blood sugars as directed and bring meter/sensor   to all visits.  We need to focus on avoiding hypoglycemia since it can lead to instability and falls.  It appears that most of her high blood sugars are rebounds.  She will track her blood sugars after she eats and if the arrows are pointing down and she is under 140 she will have a carbohydrate snack.  Is important to keep focused on the arrows on her CGM.  She will decrease Toujeo from 22 to 18 units.  3) BD done 6/2021. Lowest site RFN   T score - 2.0 To do this June. repeated at a new radiology facility and unfortunately did not do right hip did left which was stable.  f/u CDE in one mos.  f/u 2 mos.

## 2024-05-29 LAB
APPEARANCE: ABNORMAL
BILIRUBIN URINE: NEGATIVE
BLOOD URINE: NEGATIVE
COLOR: NORMAL
CREAT SPEC-SCNC: 142 MG/DL
GLUCOSE QUALITATIVE U: NEGATIVE MG/DL
KETONES URINE: ABNORMAL MG/DL
LEUKOCYTE ESTERASE URINE: ABNORMAL
MICROALBUMIN 24H UR DL<=1MG/L-MCNC: <1.2 MG/DL
MICROALBUMIN/CREAT 24H UR-RTO: NORMAL MG/G
NITRITE URINE: POSITIVE
PH URINE: 6
PROTEIN URINE: NORMAL MG/DL
SPECIFIC GRAVITY URINE: 1.03
UROBILINOGEN URINE: 1 MG/DL

## 2024-06-25 ENCOUNTER — APPOINTMENT (OUTPATIENT)
Dept: ENDOCRINOLOGY | Facility: CLINIC | Age: 74
End: 2024-06-25

## 2024-07-16 ENCOUNTER — APPOINTMENT (OUTPATIENT)
Dept: ULTRASOUND IMAGING | Facility: IMAGING CENTER | Age: 74
End: 2024-07-16
Payer: MEDICARE

## 2024-07-16 ENCOUNTER — OUTPATIENT (OUTPATIENT)
Dept: OUTPATIENT SERVICES | Facility: HOSPITAL | Age: 74
LOS: 1 days | End: 2024-07-16
Payer: MEDICARE

## 2024-07-16 DIAGNOSIS — Z47.1 AFTERCARE FOLLOWING JOINT REPLACEMENT SURGERY: Chronic | ICD-10-CM

## 2024-07-16 DIAGNOSIS — Z98.890 OTHER SPECIFIED POSTPROCEDURAL STATES: Chronic | ICD-10-CM

## 2024-07-16 DIAGNOSIS — Z00.8 ENCOUNTER FOR OTHER GENERAL EXAMINATION: ICD-10-CM

## 2024-07-16 PROCEDURE — 76536 US EXAM OF HEAD AND NECK: CPT

## 2024-07-16 PROCEDURE — 76536 US EXAM OF HEAD AND NECK: CPT | Mod: 26

## 2024-07-21 ENCOUNTER — NON-APPOINTMENT (OUTPATIENT)
Age: 74
End: 2024-07-21

## 2024-07-24 ENCOUNTER — NON-APPOINTMENT (OUTPATIENT)
Age: 74
End: 2024-07-24

## 2024-08-13 ENCOUNTER — APPOINTMENT (OUTPATIENT)
Dept: ENDOCRINOLOGY | Facility: CLINIC | Age: 74
End: 2024-08-13
Payer: MEDICARE

## 2024-08-13 ENCOUNTER — LABORATORY RESULT (OUTPATIENT)
Age: 74
End: 2024-08-13

## 2024-08-13 VITALS
DIASTOLIC BLOOD PRESSURE: 59 MMHG | HEIGHT: 66 IN | HEART RATE: 90 BPM | WEIGHT: 210 LBS | OXYGEN SATURATION: 98 % | SYSTOLIC BLOOD PRESSURE: 121 MMHG | BODY MASS INDEX: 33.75 KG/M2

## 2024-08-13 DIAGNOSIS — E89.0 POSTPROCEDURAL HYPOTHYROIDISM: ICD-10-CM

## 2024-08-13 DIAGNOSIS — Z85.850 PERSONAL HISTORY OF MALIGNANT NEOPLASM OF THYROID: ICD-10-CM

## 2024-08-13 DIAGNOSIS — E78.5 HYPERLIPIDEMIA, UNSPECIFIED: ICD-10-CM

## 2024-08-13 DIAGNOSIS — E10.9 TYPE 1 DIABETES MELLITUS W/OUT COMPLICATIONS: ICD-10-CM

## 2024-08-13 LAB
GLUCOSE BLDC GLUCOMTR-MCNC: 249
HBA1C MFR BLD HPLC: 7.7

## 2024-08-13 PROCEDURE — 82962 GLUCOSE BLOOD TEST: CPT

## 2024-08-13 PROCEDURE — 36415 COLL VENOUS BLD VENIPUNCTURE: CPT

## 2024-08-13 PROCEDURE — 99214 OFFICE O/P EST MOD 30 MIN: CPT | Mod: 25

## 2024-08-13 PROCEDURE — 83036 HEMOGLOBIN GLYCOSYLATED A1C: CPT | Mod: QW

## 2024-08-13 NOTE — ASSESSMENT
[Diabetes Foot Care] : diabetes foot care [Long Term Vascular Complications] : long term vascular complications of diabetes [Carbohydrate Consistent Diet] : carbohydrate consistent diet [Importance of Diet and Exercise] : importance of diet and exercise to improve glycemic control, achieve weight loss and improve cardiovascular health [Hypoglycemia Management] : hypoglycemia management [Glucagon Use] : glucagon use [Self Monitoring of Blood Glucose] : self monitoring of blood glucose [Retinopathy Screening] : Patient was referred to ophthalmology for retinopathy screening [Diabetic Medications] : Risks and benefits of diabetic medications were discussed [Levothyroxine] : The patient was instructed to take Levothyroxine on an empty stomach, separate from vitamins, and wait at least 30 minutes before eating [FreeTextEntry1] : 1) Thyroid cancer history/hypothyroidism  - Patient generally does a neck sonogram yearly for her  history of papillary thyroid cancer. No change in Tirosint dose.TG ab lower, last neck sonogram stable. Recent TSh stable..  She will update me on her treatment for the squamous cell carcinoma as she starts radiation treatments. She was switched to Tirosint because of better absorption due to her multiple GI issues which includes irritable bowel syndrome and a questionable diagnosis of celiac.  She is on a gluten-free diet.  2)  type 1 Diabetes - will remain off Tandem pump, she is using the Kristie sensor  To have HS snack especially when sees it dropping on the sensor.  I have also suggested that when she sees that her blood sugars rapidly dropping even if it is around 150 she should have a snack including for example some milk and crackers.  Continue to take 4 injections of insulin daily. Monitors sugar 6-8 times daily.  Carb ratio 15 Target 150 before meals  CF 50. Always factor in arrows and if before eating and sugar under 80 have 4 OZ OJ  or equivalent first. Must focus first and avoiding low sugars, Will see CDE in a few weeks. has agreed to use in pen ? .  Also to see how she is doing on her radiation treatments and how it is affecting her blood sugar. Encourage healthy lifestyle including a low carb diet and exercise as tolerated. Monitor blood sugars as directed and bring meter/sensor   to all visits.  We need to focus on avoiding hypoglycemia since it can lead to instability and falls.  It appears that most of her high blood sugars are rebounds.  She will track her blood sugars after she eats and if the arrows are pointing down and she is under 140 she will have a carbohydrate snack.  Is important to keep focused on the arrows on her CGM.  She will decrease Toujeo from 22 to 18 units.  3) BD done8/2/23 Lowest site RFN   T score - 2.0 . repeated at a new radiology facility and unfortunately did not do right hip did left which was stable.  f/u CDE in one mos.  f/u 3 mos.  labs done

## 2024-08-13 NOTE — HISTORY OF PRESENT ILLNESS
[Kristie] : Kristie [Continuous Glucose Monitoring] : Continuous Glucose Monitoring: Yes [FreeTextEntry1] : Patient is here today for follow-up visit. She has a history of thyroid cancer. She denies any hoarseness or upper respiratory infection symptoms. Given her history of thyroid cancer she is nervous about this. A neck sonogram was done and except for some small  benign lymph nodes it was negative ..  She is currently on MDI for type 1 diabetes and uses the Freestyle sensor; due to some episodes in the past of hypoglycemia unawareness and she lives alone, she now has Kristie sensor. She was trained on the T slim and Dexcom but ended up hospitalized for high sugars end of December so no longer comfortable using the pump. She was in rehab after do to difficulty walking.   She is using 22 u of Toujeo and a formula for mealtime Apidra insulin.   We did download her CGM today however she has not been wearing it because of frequent MRI that she is having her recent diagnosis of a cancer on her lip.  She is to start radiation treatments for 6 weeks next week. Last visit with CDE using an In Pen was discussed to better tract her meal time insulin doses.   [FreeTextEntry2] : 52 [FreeTextEntry3] : 10+22 [FreeTextEntry4] : 79+7 [FreeTextEntry5] : 155 [FreeTextEntry6] : 57.2

## 2024-08-13 NOTE — PHYSICAL EXAM
[Alert] : alert [Well Nourished] : well nourished [No Acute Distress] : no acute distress [Well Developed] : well developed [Normal Sclera/Conjunctiva] : normal sclera/conjunctiva [EOMI] : extra ocular movement intact [No Proptosis] : no proptosis [Well Healed Scar] : well healed scar [No Respiratory Distress] : no respiratory distress [Clear to Auscultation] : lungs were clear to auscultation bilaterally [Normal PMI] : the apical impulse was normal [Normal S1, S2] : normal S1 and S2 [Normal Rate] : heart rate was normal [Regular Rhythm] : with a regular rhythm [Pedal Pulses Normal] : the pedal pulses are present [Normal Bowel Sounds] : normal bowel sounds [Not Tender] : non-tender [Not Distended] : not distended [Soft] : abdomen soft [Normal Anterior Cervical Nodes] : no anterior cervical lymphadenopathy [No Spinal Tenderness] : no spinal tenderness [Spine Straight] : spine straight [Normal Reflexes] : deep tendon reflexes were 2+ and symmetric [Oriented x3] : oriented to person, place, and time [Normal Insight/Judgement] : insight and judgment were intact [Acanthosis Nigricans] : no acanthosis nigricans [de-identified] : + 1 edema both lower extremities [de-identified] : Scar on her right upper outer lip [de-identified] : stasis dermatitis lower legs , multiple ecchymoses on arms with some bandaged  [de-identified] : stiffness in gait

## 2024-08-22 ENCOUNTER — NON-APPOINTMENT (OUTPATIENT)
Age: 74
End: 2024-08-22

## 2024-08-22 LAB
25(OH)D3 SERPL-MCNC: 29.5 NG/ML
ALBUMIN SERPL ELPH-MCNC: 3.8 G/DL
ALP BLD-CCNC: 128 U/L
ALT SERPL-CCNC: 9 U/L
ANION GAP SERPL CALC-SCNC: 12 MMOL/L
AST SERPL-CCNC: 15 U/L
BACTERIA UR CULT: ABNORMAL
BILIRUB SERPL-MCNC: 1 MG/DL
BUN SERPL-MCNC: 24 MG/DL
CALCIUM SERPL-MCNC: 9.3 MG/DL
CHLORIDE SERPL-SCNC: 106 MMOL/L
CHOLEST SERPL-MCNC: 142 MG/DL
CO2 SERPL-SCNC: 21 MMOL/L
CREAT SERPL-MCNC: 0.73 MG/DL
CREAT SPEC-SCNC: 120 MG/DL
EGFR: 85 ML/MIN/1.73M2
GLUCOSE SERPL-MCNC: 241 MG/DL
HCT VFR BLD CALC: 39.9 %
HDLC SERPL-MCNC: 63 MG/DL
HGB BLD-MCNC: 12.2 G/DL
LDLC SERPL CALC-MCNC: 67 MG/DL
MCHC RBC-ENTMCNC: 28.6 PG
MCHC RBC-ENTMCNC: 30.6 GM/DL
MCV RBC AUTO: 93.4 FL
MICROALBUMIN 24H UR DL<=1MG/L-MCNC: 2 MG/DL
MICROALBUMIN/CREAT 24H UR-RTO: 17 MG/G
NONHDLC SERPL-MCNC: 80 MG/DL
PLATELET # BLD AUTO: 265 K/UL
POTASSIUM SERPL-SCNC: 4.5 MMOL/L
PROT SERPL-MCNC: 5.9 G/DL
RBC # BLD: 4.27 M/UL
RBC # FLD: 15.9 %
SODIUM SERPL-SCNC: 139 MMOL/L
T4 FREE SERPL-MCNC: 1.7 NG/DL
T4 SERPL-MCNC: 9.4 UG/DL
THYROGLOB AB SERPL IA-ACNC: 5.8 IU/ML
TRIGL SERPL-MCNC: 58 MG/DL
TSH SERPL-ACNC: 0.33 UIU/ML
WBC # FLD AUTO: 11.58 K/UL

## 2024-10-24 ENCOUNTER — APPOINTMENT (OUTPATIENT)
Dept: ENDOCRINOLOGY | Facility: CLINIC | Age: 74
End: 2024-10-24

## 2024-11-04 ENCOUNTER — RX RENEWAL (OUTPATIENT)
Age: 74
End: 2024-11-04

## 2024-11-21 ENCOUNTER — LABORATORY RESULT (OUTPATIENT)
Age: 74
End: 2024-11-21

## 2024-11-21 ENCOUNTER — APPOINTMENT (OUTPATIENT)
Dept: ENDOCRINOLOGY | Facility: CLINIC | Age: 74
End: 2024-11-21
Payer: MEDICARE

## 2024-11-21 VITALS
HEART RATE: 75 BPM | WEIGHT: 212 LBS | BODY MASS INDEX: 34.07 KG/M2 | HEIGHT: 66 IN | DIASTOLIC BLOOD PRESSURE: 77 MMHG | SYSTOLIC BLOOD PRESSURE: 138 MMHG | OXYGEN SATURATION: 96 %

## 2024-11-21 DIAGNOSIS — Z85.850 PERSONAL HISTORY OF MALIGNANT NEOPLASM OF THYROID: ICD-10-CM

## 2024-11-21 DIAGNOSIS — E10.9 TYPE 1 DIABETES MELLITUS W/OUT COMPLICATIONS: ICD-10-CM

## 2024-11-21 DIAGNOSIS — E78.5 HYPERLIPIDEMIA, UNSPECIFIED: ICD-10-CM

## 2024-11-21 DIAGNOSIS — I10 ESSENTIAL (PRIMARY) HYPERTENSION: ICD-10-CM

## 2024-11-21 LAB
GLUCOSE BLDC GLUCOMTR-MCNC: 193
HBA1C MFR BLD HPLC: 8.1

## 2024-11-21 PROCEDURE — 95251 CONT GLUC MNTR ANALYSIS I&R: CPT

## 2024-11-21 PROCEDURE — 99214 OFFICE O/P EST MOD 30 MIN: CPT

## 2024-11-21 PROCEDURE — 36415 COLL VENOUS BLD VENIPUNCTURE: CPT

## 2024-11-21 PROCEDURE — 82962 GLUCOSE BLOOD TEST: CPT

## 2024-11-21 PROCEDURE — 83036 HEMOGLOBIN GLYCOSYLATED A1C: CPT | Mod: QW

## 2024-11-21 RX ORDER — INSULIN LISPRO 100 [IU]/ML
100 INJECTION, SOLUTION INTRAVENOUS; SUBCUTANEOUS
Qty: 1 | Refills: 3 | Status: ACTIVE | COMMUNITY
Start: 2024-11-21 | End: 1900-01-01

## 2024-11-21 RX ORDER — BLOOD-GLUCOSE SENSOR
EACH MISCELLANEOUS
Qty: 2 | Refills: 5 | Status: ACTIVE | COMMUNITY
Start: 2024-11-21 | End: 1900-01-01

## 2024-11-21 RX ORDER — INSULIN PEN,REUSABLE,BT LISPRO
INSULIN PEN (EA) SUBCUTANEOUS
Qty: 1 | Refills: 1 | Status: ACTIVE | COMMUNITY
Start: 2024-11-21 | End: 1900-01-01

## 2024-11-22 LAB
25(OH)D3 SERPL-MCNC: 27 NG/ML
ALBUMIN SERPL ELPH-MCNC: 3.7 G/DL
ALP BLD-CCNC: 106 U/L
ALT SERPL-CCNC: 8 U/L
ANION GAP SERPL CALC-SCNC: 10 MMOL/L
AST SERPL-CCNC: 17 U/L
BILIRUB SERPL-MCNC: 0.8 MG/DL
BUN SERPL-MCNC: 31 MG/DL
CALCIUM SERPL-MCNC: 9.4 MG/DL
CHLORIDE SERPL-SCNC: 106 MMOL/L
CHOLEST SERPL-MCNC: 143 MG/DL
CO2 SERPL-SCNC: 24 MMOL/L
CREAT SERPL-MCNC: 0.73 MG/DL
CREAT SPEC-SCNC: 100 MG/DL
EGFR: 85 ML/MIN/1.73M2
GLUCOSE SERPL-MCNC: 167 MG/DL
HDLC SERPL-MCNC: 57 MG/DL
LDLC SERPL CALC-MCNC: 69 MG/DL
MICROALBUMIN 24H UR DL<=1MG/L-MCNC: <1.2 MG/DL
MICROALBUMIN/CREAT 24H UR-RTO: NORMAL MG/G
NONHDLC SERPL-MCNC: 85 MG/DL
POTASSIUM SERPL-SCNC: 4.6 MMOL/L
PROT SERPL-MCNC: 5.8 G/DL
SODIUM SERPL-SCNC: 140 MMOL/L
T4 FREE SERPL-MCNC: 1.7 NG/DL
T4 SERPL-MCNC: 8.3 UG/DL
TRIGL SERPL-MCNC: 84 MG/DL
TSH SERPL-ACNC: 0.49 UIU/ML

## 2024-11-30 LAB — THYROGLOB AB SERPL IA-ACNC: 7.8 IU/ML

## 2024-12-12 ENCOUNTER — NON-APPOINTMENT (OUTPATIENT)
Age: 74
End: 2024-12-12

## 2024-12-19 ENCOUNTER — APPOINTMENT (OUTPATIENT)
Dept: ENDOCRINOLOGY | Facility: CLINIC | Age: 74
End: 2024-12-19
Payer: MEDICARE

## 2024-12-19 DIAGNOSIS — E10.9 TYPE 1 DIABETES MELLITUS W/OUT COMPLICATIONS: ICD-10-CM

## 2024-12-19 PROCEDURE — G0108 DIAB MANAGE TRN  PER INDIV: CPT

## 2024-12-19 PROCEDURE — 95251 CONT GLUC MNTR ANALYSIS I&R: CPT

## 2025-01-10 ENCOUNTER — NON-APPOINTMENT (OUTPATIENT)
Age: 75
End: 2025-01-10

## 2025-01-16 ENCOUNTER — APPOINTMENT (OUTPATIENT)
Dept: ENDOCRINOLOGY | Facility: CLINIC | Age: 75
End: 2025-01-16
Payer: MEDICARE

## 2025-01-16 VITALS
HEIGHT: 66 IN | WEIGHT: 212 LBS | SYSTOLIC BLOOD PRESSURE: 114 MMHG | BODY MASS INDEX: 34.07 KG/M2 | HEART RATE: 78 BPM | OXYGEN SATURATION: 97 % | DIASTOLIC BLOOD PRESSURE: 70 MMHG

## 2025-01-16 DIAGNOSIS — R79.89 OTHER SPECIFIED ABNORMAL FINDINGS OF BLOOD CHEMISTRY: ICD-10-CM

## 2025-01-16 DIAGNOSIS — E10.9 TYPE 1 DIABETES MELLITUS W/OUT COMPLICATIONS: ICD-10-CM

## 2025-01-16 LAB — GLUCOSE BLDC GLUCOMTR-MCNC: 134

## 2025-01-16 PROCEDURE — 95251 CONT GLUC MNTR ANALYSIS I&R: CPT

## 2025-01-16 PROCEDURE — 82962 GLUCOSE BLOOD TEST: CPT

## 2025-01-16 PROCEDURE — G0108 DIAB MANAGE TRN  PER INDIV: CPT

## 2025-01-27 ENCOUNTER — APPOINTMENT (OUTPATIENT)
Dept: ENDOCRINOLOGY | Facility: CLINIC | Age: 75
End: 2025-01-27

## 2025-02-20 ENCOUNTER — APPOINTMENT (OUTPATIENT)
Dept: ENDOCRINOLOGY | Facility: CLINIC | Age: 75
End: 2025-02-20
Payer: MEDICARE

## 2025-02-20 VITALS — HEART RATE: 82 BPM | HEIGHT: 66 IN | OXYGEN SATURATION: 99 %

## 2025-02-20 DIAGNOSIS — E10.9 TYPE 1 DIABETES MELLITUS W/OUT COMPLICATIONS: ICD-10-CM

## 2025-02-20 DIAGNOSIS — Z85.850 PERSONAL HISTORY OF MALIGNANT NEOPLASM OF THYROID: ICD-10-CM

## 2025-02-20 DIAGNOSIS — I10 ESSENTIAL (PRIMARY) HYPERTENSION: ICD-10-CM

## 2025-02-20 LAB
GLUCOSE BLDC GLUCOMTR-MCNC: 217
HBA1C MFR BLD HPLC: 8

## 2025-02-20 PROCEDURE — 99215 OFFICE O/P EST HI 40 MIN: CPT

## 2025-02-20 PROCEDURE — 83036 HEMOGLOBIN GLYCOSYLATED A1C: CPT | Mod: QW

## 2025-02-20 PROCEDURE — 82962 GLUCOSE BLOOD TEST: CPT

## 2025-02-20 PROCEDURE — 95251 CONT GLUC MNTR ANALYSIS I&R: CPT

## 2025-02-20 PROCEDURE — 36415 COLL VENOUS BLD VENIPUNCTURE: CPT

## 2025-02-20 RX ORDER — BLOOD-GLUCOSE SENSOR
EACH MISCELLANEOUS
Qty: 2 | Refills: 4 | Status: ACTIVE | COMMUNITY
Start: 2025-02-20 | End: 1900-01-01

## 2025-02-25 LAB
ALBUMIN SERPL ELPH-MCNC: 4 G/DL
ALP BLD-CCNC: 76 U/L
ALT SERPL-CCNC: 6 U/L
ANION GAP SERPL CALC-SCNC: 11 MMOL/L
AST SERPL-CCNC: 14 U/L
BILIRUB SERPL-MCNC: 1.3 MG/DL
BUN SERPL-MCNC: 27 MG/DL
CA-I SERPL-SCNC: 5.1 MG/DL
CALCIUM SERPL-MCNC: 9.4 MG/DL
CALCIUM SERPL-MCNC: 9.4 MG/DL
CHLORIDE SERPL-SCNC: 102 MMOL/L
CO2 SERPL-SCNC: 24 MMOL/L
CREAT SERPL-MCNC: 1.01 MG/DL
EGFR: 58 ML/MIN/1.73M2
GLUCOSE SERPL-MCNC: 166 MG/DL
PARATHYROID HORMONE INTACT: 206 PG/ML
POTASSIUM SERPL-SCNC: 4.4 MMOL/L
PROT SERPL-MCNC: 6 G/DL
SODIUM SERPL-SCNC: 138 MMOL/L
T4 FREE SERPL-MCNC: 1.5 NG/DL
T4 SERPL-MCNC: 10.2 UG/DL
TSH SERPL-ACNC: 0.89 UIU/ML

## 2025-03-05 ENCOUNTER — APPOINTMENT (OUTPATIENT)
Dept: ENDOCRINOLOGY | Facility: CLINIC | Age: 75
End: 2025-03-05
Payer: MEDICARE

## 2025-03-05 VITALS
HEART RATE: 73 BPM | SYSTOLIC BLOOD PRESSURE: 130 MMHG | DIASTOLIC BLOOD PRESSURE: 76 MMHG | HEIGHT: 66 IN | OXYGEN SATURATION: 98 %

## 2025-03-05 DIAGNOSIS — E10.9 TYPE 1 DIABETES MELLITUS W/OUT COMPLICATIONS: ICD-10-CM

## 2025-03-05 LAB — GLUCOSE BLDC GLUCOMTR-MCNC: 329

## 2025-03-05 PROCEDURE — G0108 DIAB MANAGE TRN  PER INDIV: CPT

## 2025-03-05 PROCEDURE — 95251 CONT GLUC MNTR ANALYSIS I&R: CPT

## 2025-03-05 PROCEDURE — 82962 GLUCOSE BLOOD TEST: CPT

## 2025-03-11 ENCOUNTER — NON-APPOINTMENT (OUTPATIENT)
Age: 75
End: 2025-03-11

## 2025-03-20 ENCOUNTER — OFFICE (OUTPATIENT)
Facility: LOCATION | Age: 75
Setting detail: OPHTHALMOLOGY
End: 2025-03-20
Payer: MEDICARE

## 2025-03-20 DIAGNOSIS — H16.223: ICD-10-CM

## 2025-03-20 DIAGNOSIS — E11.9: ICD-10-CM

## 2025-03-20 DIAGNOSIS — H26.493: ICD-10-CM

## 2025-03-20 PROCEDURE — 99214 OFFICE O/P EST MOD 30 MIN: CPT | Performed by: OPHTHALMOLOGY

## 2025-03-20 ASSESSMENT — REFRACTION_AUTOREFRACTION
OS_CYLINDER: -1.50
OS_SPHERE: +1.25
OS_AXIS: 055
OD_CYLINDER: -1.75
OD_SPHERE: +1.75
OD_AXIS: 100

## 2025-03-20 ASSESSMENT — REFRACTION_CURRENTRX
OD_ADD: +2.75
OS_AXIS: 170
OD_AXIS: 087
OD_SPHERE: +0.50
OS_VPRISM_DIRECTION: PROGS
OS_CYLINDER: -0.75
OD_OVR_VA: 20/
OD_CYLINDER: -0.75
OS_ADD: +2.75
OS_CYLINDER: -0.75
OD_CYLINDER: -0.75
OS_AXIS: 003
OD_SPHERE: +0.50
OS_OVR_VA: 20/
OS_SPHERE: PLANO
OD_OVR_VA: 20/
OS_OVR_VA: 20/
OS_ADD: +2.75
OD_VPRISM_DIRECTION: PROGS
OS_SPHERE: PLANO
OD_ADD: +2.75
OD_AXIS: 088

## 2025-03-20 ASSESSMENT — KERATOMETRY
OD_K1POWER_DIOPTERS: 42.75
METHOD_AUTO_MANUAL: AUTO
OD_K2POWER_DIOPTERS: 43.50
OS_AXISANGLE_DEGREES: 130
OS_K1POWER_DIOPTERS: 43.00
OS_K2POWER_DIOPTERS: 44.00
OD_AXISANGLE_DEGREES: 030

## 2025-03-20 ASSESSMENT — VISUAL ACUITY
OS_BCVA: 20/25-2
OD_BCVA: 20/50-

## 2025-03-20 ASSESSMENT — REFRACTION_MANIFEST
OS_SPHERE: PLANO
OD_AXIS: 100
OD_ADD: +2.75
OD_AXIS: 090
OS_CYLINDER: -1.50
OS_CYLINDER: -0.75
OD_SPHERE: +1.75
OS_SPHERE: +0.25
OS_AXIS: 055
OD_CYLINDER: -1.75
OD_CYLINDER: -0.75
OS_ADD: +2.75
OS_AXIS: 170
OD_SPHERE: +0.50

## 2025-03-20 ASSESSMENT — SUPERFICIAL PUNCTATE KERATITIS (SPK)
OS_SPK: 1+
OD_SPK: 1+

## 2025-03-20 ASSESSMENT — CONFRONTATIONAL VISUAL FIELD TEST (CVF)
OD_FINDINGS: FULL
OS_FINDINGS: FULL

## 2025-03-27 ENCOUNTER — RX ONLY (RX ONLY)
Age: 75
End: 2025-03-27

## 2025-03-27 ENCOUNTER — OFFICE (OUTPATIENT)
Facility: LOCATION | Age: 75
Setting detail: OPHTHALMOLOGY
End: 2025-03-27
Payer: MEDICARE

## 2025-03-27 ENCOUNTER — APPOINTMENT (OUTPATIENT)
Dept: PAIN MANAGEMENT | Facility: CLINIC | Age: 75
End: 2025-03-27
Payer: MEDICARE

## 2025-03-27 VITALS — WEIGHT: 212 LBS | BODY MASS INDEX: 34.07 KG/M2 | HEIGHT: 66 IN

## 2025-03-27 DIAGNOSIS — M54.2 CERVICALGIA: ICD-10-CM

## 2025-03-27 DIAGNOSIS — M54.12 RADICULOPATHY, CERVICAL REGION: ICD-10-CM

## 2025-03-27 DIAGNOSIS — H26.492: ICD-10-CM

## 2025-03-27 DIAGNOSIS — M79.18 MYALGIA, OTHER SITE: ICD-10-CM

## 2025-03-27 PROBLEM — H26.491 POSTERIOR CAPSULAR OPACIFICATION; RIGHT EYE, LEFT EYE: Status: ACTIVE | Noted: 2025-03-27

## 2025-03-27 PROCEDURE — 20552 NJX 1/MLT TRIGGER POINT 1/2: CPT

## 2025-03-27 PROCEDURE — J3490M: CUSTOM | Mod: NC

## 2025-03-27 PROCEDURE — 99214 OFFICE O/P EST MOD 30 MIN: CPT | Mod: 25

## 2025-03-27 PROCEDURE — 66821 AFTER CATARACT LASER SURGERY: CPT | Mod: LT | Performed by: OPHTHALMOLOGY

## 2025-03-27 ASSESSMENT — REFRACTION_CURRENTRX
OS_OVR_VA: 20/
OS_CYLINDER: -0.75
OS_AXIS: 170
OD_ADD: +2.75
OS_ADD: +2.75
OS_SPHERE: PLANO
OD_ADD: +2.75
OD_OVR_VA: 20/
OD_SPHERE: +0.50
OS_CYLINDER: -0.75
OD_OVR_VA: 20/
OD_AXIS: 087
OS_SPHERE: PLANO
OS_VPRISM_DIRECTION: PROGS
OD_SPHERE: +0.50
OS_OVR_VA: 20/
OS_ADD: +2.75
OS_AXIS: 003
OD_CYLINDER: -0.75
OD_VPRISM_DIRECTION: PROGS
OD_CYLINDER: -0.75
OD_AXIS: 088

## 2025-03-27 ASSESSMENT — REFRACTION_AUTOREFRACTION
OS_SPHERE: +1.25
OD_AXIS: 100
OD_CYLINDER: -1.75
OS_AXIS: 055
OD_SPHERE: +1.75
OS_CYLINDER: -1.50

## 2025-03-27 ASSESSMENT — SUPERFICIAL PUNCTATE KERATITIS (SPK)
OD_SPK: 1+
OS_SPK: 1+

## 2025-03-27 ASSESSMENT — KERATOMETRY
OS_AXISANGLE_DEGREES: 130
METHOD_AUTO_MANUAL: AUTO
OS_K2POWER_DIOPTERS: 44.00
OD_K2POWER_DIOPTERS: 43.50
OD_K1POWER_DIOPTERS: 42.75
OS_K1POWER_DIOPTERS: 43.00
OD_AXISANGLE_DEGREES: 030

## 2025-03-27 ASSESSMENT — REFRACTION_MANIFEST
OS_AXIS: 170
OD_CYLINDER: -1.75
OS_CYLINDER: -0.75
OS_CYLINDER: -1.50
OD_ADD: +2.75
OD_AXIS: 090
OD_SPHERE: +1.75
OD_CYLINDER: -0.75
OD_AXIS: 100
OS_SPHERE: +0.25
OD_SPHERE: +0.50
OS_ADD: +2.75
OS_AXIS: 055
OS_SPHERE: PLANO

## 2025-03-27 ASSESSMENT — CONFRONTATIONAL VISUAL FIELD TEST (CVF)
OS_FINDINGS: FULL
OD_FINDINGS: FULL

## 2025-03-27 ASSESSMENT — VISUAL ACUITY
OS_BCVA: 20/25+2
OD_BCVA: 20/50

## 2025-04-03 ENCOUNTER — RX ONLY (RX ONLY)
Age: 75
End: 2025-04-03

## 2025-04-03 ENCOUNTER — OFFICE (OUTPATIENT)
Facility: LOCATION | Age: 75
Setting detail: OPHTHALMOLOGY
End: 2025-04-03
Payer: MEDICARE

## 2025-04-03 DIAGNOSIS — H26.492: ICD-10-CM

## 2025-04-03 DIAGNOSIS — H26.491: ICD-10-CM

## 2025-04-03 PROCEDURE — 99024 POSTOP FOLLOW-UP VISIT: CPT | Performed by: OPHTHALMOLOGY

## 2025-04-03 PROCEDURE — 66821 AFTER CATARACT LASER SURGERY: CPT | Mod: 79,RT | Performed by: OPHTHALMOLOGY

## 2025-04-03 ASSESSMENT — REFRACTION_AUTOREFRACTION
OD_AXIS: 100
OS_AXIS: 055
OS_CYLINDER: -1.50
OD_SPHERE: +1.75
OS_SPHERE: +1.25
OD_CYLINDER: -1.75

## 2025-04-03 ASSESSMENT — REFRACTION_CURRENTRX
OD_CYLINDER: -0.75
OD_AXIS: 087
OS_CYLINDER: -0.75
OD_SPHERE: +0.50
OS_AXIS: 170
OD_OVR_VA: 20/
OD_ADD: +2.75
OS_CYLINDER: -0.75
OS_OVR_VA: 20/
OS_VPRISM_DIRECTION: PROGS
OD_SPHERE: +0.50
OD_ADD: +2.75
OS_ADD: +2.75
OS_AXIS: 003
OD_VPRISM_DIRECTION: PROGS
OS_OVR_VA: 20/
OD_CYLINDER: -0.75
OS_ADD: +2.75
OS_SPHERE: PLANO
OD_OVR_VA: 20/
OS_SPHERE: PLANO
OD_AXIS: 088

## 2025-04-03 ASSESSMENT — REFRACTION_MANIFEST
OS_CYLINDER: -1.50
OS_AXIS: 170
OD_CYLINDER: -1.75
OD_AXIS: 100
OS_AXIS: 055
OD_CYLINDER: -0.75
OS_CYLINDER: -0.75
OS_ADD: +2.75
OD_SPHERE: +1.75
OS_SPHERE: +0.25
OD_ADD: +2.75
OD_SPHERE: +0.50
OD_AXIS: 090
OS_SPHERE: PLANO

## 2025-04-03 ASSESSMENT — KERATOMETRY
OS_K1POWER_DIOPTERS: 43.00
OD_K2POWER_DIOPTERS: 43.50
METHOD_AUTO_MANUAL: AUTO
OS_K2POWER_DIOPTERS: 44.00
OS_AXISANGLE_DEGREES: 130
OD_AXISANGLE_DEGREES: 030
OD_K1POWER_DIOPTERS: 42.75

## 2025-04-03 ASSESSMENT — CONFRONTATIONAL VISUAL FIELD TEST (CVF)
OS_FINDINGS: FULL
OD_FINDINGS: FULL

## 2025-04-03 ASSESSMENT — VISUAL ACUITY
OS_BCVA: 20/30+
OD_BCVA: 20/60+

## 2025-04-03 ASSESSMENT — SUPERFICIAL PUNCTATE KERATITIS (SPK)
OD_SPK: 1+
OS_SPK: 1+

## 2025-04-17 ENCOUNTER — APPOINTMENT (OUTPATIENT)
Dept: PAIN MANAGEMENT | Facility: CLINIC | Age: 75
End: 2025-04-17

## 2025-05-01 ENCOUNTER — RX RENEWAL (OUTPATIENT)
Age: 75
End: 2025-05-01

## 2025-05-05 ENCOUNTER — LABORATORY RESULT (OUTPATIENT)
Age: 75
End: 2025-05-05

## 2025-05-05 ENCOUNTER — APPOINTMENT (OUTPATIENT)
Dept: ENDOCRINOLOGY | Facility: CLINIC | Age: 75
End: 2025-05-05
Payer: MEDICARE

## 2025-05-05 ENCOUNTER — RX RENEWAL (OUTPATIENT)
Age: 75
End: 2025-05-05

## 2025-05-05 VITALS
OXYGEN SATURATION: 98 % | DIASTOLIC BLOOD PRESSURE: 55 MMHG | HEIGHT: 66 IN | BODY MASS INDEX: 33.75 KG/M2 | HEART RATE: 70 BPM | SYSTOLIC BLOOD PRESSURE: 99 MMHG | WEIGHT: 210 LBS

## 2025-05-05 DIAGNOSIS — E89.0 POSTPROCEDURAL HYPOTHYROIDISM: ICD-10-CM

## 2025-05-05 DIAGNOSIS — I10 ESSENTIAL (PRIMARY) HYPERTENSION: ICD-10-CM

## 2025-05-05 DIAGNOSIS — R79.89 OTHER SPECIFIED ABNORMAL FINDINGS OF BLOOD CHEMISTRY: ICD-10-CM

## 2025-05-05 DIAGNOSIS — Z85.850 PERSONAL HISTORY OF MALIGNANT NEOPLASM OF THYROID: ICD-10-CM

## 2025-05-05 DIAGNOSIS — E10.9 TYPE 1 DIABETES MELLITUS W/OUT COMPLICATIONS: ICD-10-CM

## 2025-05-05 LAB
GLUCOSE BLDC GLUCOMTR-MCNC: 301
HBA1C MFR BLD HPLC: 8.5

## 2025-05-05 PROCEDURE — 36415 COLL VENOUS BLD VENIPUNCTURE: CPT

## 2025-05-05 PROCEDURE — 99215 OFFICE O/P EST HI 40 MIN: CPT

## 2025-05-05 PROCEDURE — 83036 HEMOGLOBIN GLYCOSYLATED A1C: CPT | Mod: QW

## 2025-05-05 PROCEDURE — 82962 GLUCOSE BLOOD TEST: CPT

## 2025-05-06 LAB
ALBUMIN SERPL ELPH-MCNC: 4 G/DL
ALP BLD-CCNC: 76 U/L
ALT SERPL-CCNC: 21 U/L
ANION GAP SERPL CALC-SCNC: 13 MMOL/L
AST SERPL-CCNC: 27 U/L
BILIRUB SERPL-MCNC: 0.8 MG/DL
BUN SERPL-MCNC: 28 MG/DL
CALCIUM SERPL-MCNC: 9.7 MG/DL
CHLORIDE SERPL-SCNC: 103 MMOL/L
CO2 SERPL-SCNC: 24 MMOL/L
CREAT SERPL-MCNC: 0.85 MG/DL
EGFRCR SERPLBLD CKD-EPI 2021: 71 ML/MIN/1.73M2
GLUCOSE SERPL-MCNC: 268 MG/DL
POTASSIUM SERPL-SCNC: 4.7 MMOL/L
PROT SERPL-MCNC: 6 G/DL
SODIUM SERPL-SCNC: 140 MMOL/L
T4 FREE SERPL-MCNC: 1.7 NG/DL
T4 SERPL-MCNC: 9.8 UG/DL
TSH SERPL-ACNC: 1.31 UIU/ML

## 2025-05-12 ENCOUNTER — APPOINTMENT (OUTPATIENT)
Dept: ENDOCRINOLOGY | Facility: CLINIC | Age: 75
End: 2025-05-12
Payer: MEDICARE

## 2025-05-12 VITALS
HEART RATE: 65 BPM | WEIGHT: 210 LBS | OXYGEN SATURATION: 97 % | BODY MASS INDEX: 33.75 KG/M2 | HEIGHT: 66 IN | DIASTOLIC BLOOD PRESSURE: 66 MMHG | SYSTOLIC BLOOD PRESSURE: 106 MMHG

## 2025-05-12 DIAGNOSIS — E10.9 TYPE 1 DIABETES MELLITUS W/OUT COMPLICATIONS: ICD-10-CM

## 2025-05-12 PROCEDURE — G0108 DIAB MANAGE TRN  PER INDIV: CPT | Mod: GA

## 2025-05-13 LAB — THYROGLOB AB SERPL IA-ACNC: 7.5 IU/ML

## 2025-05-13 RX ORDER — LANCETS 33 GAUGE
31G X 5 MM EACH MISCELLANEOUS
Qty: 2 | Refills: 5 | Status: ACTIVE | COMMUNITY
Start: 2025-05-12

## 2025-05-15 ENCOUNTER — APPOINTMENT (OUTPATIENT)
Dept: PAIN MANAGEMENT | Facility: CLINIC | Age: 75
End: 2025-05-15
Payer: MEDICARE

## 2025-05-15 VITALS — BODY MASS INDEX: 33.75 KG/M2 | WEIGHT: 210 LBS | HEIGHT: 66 IN

## 2025-05-15 DIAGNOSIS — M54.12 RADICULOPATHY, CERVICAL REGION: ICD-10-CM

## 2025-05-15 DIAGNOSIS — M79.18 MYALGIA, OTHER SITE: ICD-10-CM

## 2025-05-15 PROCEDURE — 99214 OFFICE O/P EST MOD 30 MIN: CPT | Mod: 25

## 2025-05-15 PROCEDURE — J3490M: CUSTOM | Mod: NC

## 2025-05-15 PROCEDURE — 20552 NJX 1/MLT TRIGGER POINT 1/2: CPT

## 2025-05-16 LAB
24R-OH-CALCIDIOL SERPL-MCNC: 44.7 PG/ML
25(OH)D3 SERPL-MCNC: 35.7 NG/ML
CALCIUM SERPL-MCNC: 9.9 MG/DL
PARATHYROID HORMONE INTACT: 64 PG/ML

## 2025-05-21 ENCOUNTER — NON-APPOINTMENT (OUTPATIENT)
Age: 75
End: 2025-05-21

## 2025-06-12 ENCOUNTER — LABORATORY RESULT (OUTPATIENT)
Age: 75
End: 2025-06-12

## 2025-06-12 ENCOUNTER — APPOINTMENT (OUTPATIENT)
Dept: ENDOCRINOLOGY | Facility: CLINIC | Age: 75
End: 2025-06-12
Payer: MEDICARE

## 2025-06-12 VITALS — HEART RATE: 72 BPM | DIASTOLIC BLOOD PRESSURE: 76 MMHG | SYSTOLIC BLOOD PRESSURE: 122 MMHG

## 2025-06-12 PROCEDURE — G0108 DIAB MANAGE TRN  PER INDIV: CPT

## 2025-06-12 PROCEDURE — 95251 CONT GLUC MNTR ANALYSIS I&R: CPT

## 2025-06-16 LAB
APPEARANCE: CLEAR
BILIRUBIN URINE: NEGATIVE
BLOOD URINE: NEGATIVE
COLOR: YELLOW
GLUCOSE QUALITATIVE U: >=1000 MG/DL
KETONES URINE: NEGATIVE MG/DL
LEUKOCYTE ESTERASE URINE: ABNORMAL
NITRITE URINE: NEGATIVE
PH URINE: 7
PROTEIN URINE: NEGATIVE MG/DL
SPECIFIC GRAVITY URINE: >1.03
UROBILINOGEN URINE: 0.2 MG/DL

## 2025-06-17 ENCOUNTER — NON-APPOINTMENT (OUTPATIENT)
Age: 75
End: 2025-06-17

## 2025-06-17 LAB — BACTERIA UR CULT: ABNORMAL

## 2025-07-18 ENCOUNTER — LABORATORY RESULT (OUTPATIENT)
Age: 75
End: 2025-07-18

## 2025-07-24 ENCOUNTER — APPOINTMENT (OUTPATIENT)
Dept: ENDOCRINOLOGY | Facility: CLINIC | Age: 75
End: 2025-07-24
Payer: MEDICARE

## 2025-07-24 VITALS
BODY MASS INDEX: 33.75 KG/M2 | HEART RATE: 72 BPM | OXYGEN SATURATION: 99 % | WEIGHT: 210 LBS | SYSTOLIC BLOOD PRESSURE: 128 MMHG | HEIGHT: 66 IN | DIASTOLIC BLOOD PRESSURE: 76 MMHG

## 2025-07-24 LAB
GLUCOSE BLDC GLUCOMTR-MCNC: 285
HBA1C MFR BLD HPLC: 8.3

## 2025-07-24 PROCEDURE — 95251 CONT GLUC MNTR ANALYSIS I&R: CPT

## 2025-07-24 PROCEDURE — G0108 DIAB MANAGE TRN  PER INDIV: CPT

## 2025-07-24 PROCEDURE — 82962 GLUCOSE BLOOD TEST: CPT

## 2025-07-24 PROCEDURE — 83036 HEMOGLOBIN GLYCOSYLATED A1C: CPT | Mod: QW

## 2025-08-04 ENCOUNTER — APPOINTMENT (OUTPATIENT)
Dept: ENDOCRINOLOGY | Facility: CLINIC | Age: 75
End: 2025-08-04
Payer: MEDICARE

## 2025-08-04 ENCOUNTER — LABORATORY RESULT (OUTPATIENT)
Age: 75
End: 2025-08-04

## 2025-08-04 VITALS
WEIGHT: 210 LBS | DIASTOLIC BLOOD PRESSURE: 80 MMHG | HEART RATE: 78 BPM | OXYGEN SATURATION: 99 % | HEIGHT: 66 IN | SYSTOLIC BLOOD PRESSURE: 126 MMHG | BODY MASS INDEX: 33.75 KG/M2

## 2025-08-04 DIAGNOSIS — E10.9 TYPE 1 DIABETES MELLITUS W/OUT COMPLICATIONS: ICD-10-CM

## 2025-08-04 DIAGNOSIS — E55.9 VITAMIN D DEFICIENCY, UNSPECIFIED: ICD-10-CM

## 2025-08-04 DIAGNOSIS — Z85.850 PERSONAL HISTORY OF MALIGNANT NEOPLASM OF THYROID: ICD-10-CM

## 2025-08-04 DIAGNOSIS — E78.5 HYPERLIPIDEMIA, UNSPECIFIED: ICD-10-CM

## 2025-08-04 LAB — GLUCOSE BLDC GLUCOMTR-MCNC: 170

## 2025-08-04 PROCEDURE — 99214 OFFICE O/P EST MOD 30 MIN: CPT

## 2025-08-04 PROCEDURE — 82962 GLUCOSE BLOOD TEST: CPT

## 2025-08-04 PROCEDURE — 36415 COLL VENOUS BLD VENIPUNCTURE: CPT

## 2025-08-04 PROCEDURE — 95251 CONT GLUC MNTR ANALYSIS I&R: CPT

## 2025-08-05 LAB
APPEARANCE: CLEAR
BILIRUBIN URINE: NEGATIVE
BLOOD URINE: NEGATIVE
COLOR: YELLOW
GLUCOSE QUALITATIVE U: >=1000 MG/DL
KETONES URINE: ABNORMAL MG/DL
LEUKOCYTE ESTERASE URINE: ABNORMAL
NITRITE URINE: NEGATIVE
PH URINE: 5.5
PROTEIN URINE: NEGATIVE MG/DL
SPECIFIC GRAVITY URINE: 1.03
UROBILINOGEN URINE: 1 MG/DL

## 2025-08-07 ENCOUNTER — LABORATORY RESULT (OUTPATIENT)
Age: 75
End: 2025-08-07

## 2025-08-07 ENCOUNTER — APPOINTMENT (OUTPATIENT)
Dept: ENDOCRINOLOGY | Facility: CLINIC | Age: 75
End: 2025-08-07
Payer: MEDICARE

## 2025-08-07 PROCEDURE — 36415 COLL VENOUS BLD VENIPUNCTURE: CPT

## 2025-08-08 LAB
25(OH)D3 SERPL-MCNC: 31.9 NG/ML
ALBUMIN SERPL ELPH-MCNC: 4.1 G/DL
ALP BLD-CCNC: 90 U/L
ALT SERPL-CCNC: 26 U/L
ANION GAP SERPL CALC-SCNC: 12 MMOL/L
AST SERPL-CCNC: 23 U/L
BASOPHILS # BLD AUTO: 0.1 K/UL
BASOPHILS NFR BLD AUTO: 1.2 %
BILIRUB SERPL-MCNC: 1 MG/DL
BUN SERPL-MCNC: 27 MG/DL
CALCIUM SERPL-MCNC: 10.4 MG/DL
CHLORIDE SERPL-SCNC: 102 MMOL/L
CHOLEST SERPL-MCNC: 155 MG/DL
CO2 SERPL-SCNC: 25 MMOL/L
CREAT SERPL-MCNC: 0.78 MG/DL
EGFRCR SERPLBLD CKD-EPI 2021: 78 ML/MIN/1.73M2
EOSINOPHIL # BLD AUTO: 0.41 K/UL
EOSINOPHIL NFR BLD AUTO: 4.8 %
ESTIMATED AVERAGE GLUCOSE: 192 MG/DL
GLUCOSE SERPL-MCNC: 212 MG/DL
HBA1C MFR BLD HPLC: 8.3 %
HCT VFR BLD CALC: 41.9 %
HDLC SERPL-MCNC: 61 MG/DL
HGB BLD-MCNC: 13.1 G/DL
IMM GRANULOCYTES NFR BLD AUTO: 0.2 %
LDLC SERPL-MCNC: 76 MG/DL
LYMPHOCYTES # BLD AUTO: 2.08 K/UL
LYMPHOCYTES NFR BLD AUTO: 24.2 %
MAN DIFF?: NORMAL
MCHC RBC-ENTMCNC: 29.7 PG
MCHC RBC-ENTMCNC: 31.3 G/DL
MCV RBC AUTO: 95 FL
MONOCYTES # BLD AUTO: 0.67 K/UL
MONOCYTES NFR BLD AUTO: 7.8 %
NEUTROPHILS # BLD AUTO: 5.33 K/UL
NEUTROPHILS NFR BLD AUTO: 61.8 %
NONHDLC SERPL-MCNC: 94 MG/DL
PLATELET # BLD AUTO: 174 K/UL
POTASSIUM SERPL-SCNC: 4.4 MMOL/L
PROT SERPL-MCNC: 6.3 G/DL
RBC # BLD: 4.41 M/UL
RBC # FLD: 14.3 %
SODIUM SERPL-SCNC: 139 MMOL/L
T4 FREE SERPL-MCNC: 1.6 NG/DL
T4 SERPL-MCNC: 9.8 UG/DL
TRIGL SERPL-MCNC: 100 MG/DL
TSH SERPL-ACNC: 1.08 UIU/ML
WBC # FLD AUTO: 8.61 K/UL

## 2025-08-14 ENCOUNTER — NON-APPOINTMENT (OUTPATIENT)
Age: 75
End: 2025-08-14

## 2025-08-15 LAB — THYROGLOB AB SERPL IA-ACNC: 6.5 IU/ML
